# Patient Record
Sex: MALE | Race: BLACK OR AFRICAN AMERICAN | URBAN - METROPOLITAN AREA
[De-identification: names, ages, dates, MRNs, and addresses within clinical notes are randomized per-mention and may not be internally consistent; named-entity substitution may affect disease eponyms.]

---

## 2019-06-23 ENCOUNTER — HOSPITAL ENCOUNTER (INPATIENT)
Age: 54
LOS: 1 days | Discharge: OTHER INSTITUTION | DRG: 093 | End: 2019-06-25
Attending: FAMILY MEDICINE | Admitting: INTERNAL MEDICINE
Payer: COMMERCIAL

## 2019-06-23 ENCOUNTER — APPOINTMENT (OUTPATIENT)
Dept: GENERAL RADIOLOGY | Age: 54
DRG: 093 | End: 2019-06-23
Payer: COMMERCIAL

## 2019-06-23 ENCOUNTER — APPOINTMENT (OUTPATIENT)
Dept: CT IMAGING | Age: 54
DRG: 093 | End: 2019-06-23
Payer: COMMERCIAL

## 2019-06-23 DIAGNOSIS — R40.0 SOMNOLENCE: Primary | ICD-10-CM

## 2019-06-23 DIAGNOSIS — F25.1 SCHIZOAFFECTIVE DISORDER, DEPRESSIVE TYPE (HCC): ICD-10-CM

## 2019-06-23 PROBLEM — R41.82 ALTERED MENTAL STATUS: Status: ACTIVE | Noted: 2019-06-23

## 2019-06-23 LAB
ALBUMIN SERPL-MCNC: 4.1 G/DL (ref 3.5–4.6)
ALP BLD-CCNC: 98 U/L (ref 35–104)
ALT SERPL-CCNC: 27 U/L (ref 0–41)
AMPHETAMINE SCREEN, URINE: NORMAL
AMYLASE: 46 U/L (ref 22–93)
ANION GAP SERPL CALCULATED.3IONS-SCNC: 14 MEQ/L (ref 9–15)
AST SERPL-CCNC: 19 U/L (ref 0–40)
BARBITURATE SCREEN URINE: NORMAL
BASE EXCESS ARTERIAL: -4 (ref -3–3)
BASOPHILS ABSOLUTE: 0 K/UL (ref 0–0.2)
BASOPHILS RELATIVE PERCENT: 0.3 %
BENZODIAZEPINE SCREEN, URINE: NORMAL
BILIRUB SERPL-MCNC: <0.2 MG/DL (ref 0.2–0.7)
BILIRUBIN URINE: NEGATIVE
BLOOD, URINE: NEGATIVE
BUN BLDV-MCNC: 9 MG/DL (ref 6–20)
CALCIUM IONIZED: 1.24 MMOL/L (ref 1.12–1.32)
CALCIUM SERPL-MCNC: 9.1 MG/DL (ref 8.5–9.9)
CANNABINOID SCREEN URINE: NORMAL
CHLORIDE BLD-SCNC: 96 MEQ/L (ref 95–107)
CHP ED QC CHECK: YES
CK MB: 3.1 NG/ML (ref 0–6.7)
CLARITY: CLEAR
CO2: 23 MEQ/L (ref 20–31)
COCAINE METABOLITE SCREEN URINE: NORMAL
COLOR: YELLOW
CREAT SERPL-MCNC: 0.72 MG/DL (ref 0.7–1.2)
CREATINE KINASE-MB INDEX: 1 % (ref 0–3.5)
EKG ATRIAL RATE: 83 BPM
EKG P AXIS: 36 DEGREES
EKG P-R INTERVAL: 170 MS
EKG Q-T INTERVAL: 370 MS
EKG QRS DURATION: 84 MS
EKG QTC CALCULATION (BAZETT): 434 MS
EKG R AXIS: 38 DEGREES
EKG T AXIS: 11 DEGREES
EKG VENTRICULAR RATE: 83 BPM
EOSINOPHILS ABSOLUTE: 0.1 K/UL (ref 0–0.7)
EOSINOPHILS RELATIVE PERCENT: 1.2 %
ETHANOL PERCENT: NORMAL G/DL
ETHANOL: <10 MG/DL (ref 0–0.08)
GFR AFRICAN AMERICAN: >60
GFR AFRICAN AMERICAN: >60
GFR NON-AFRICAN AMERICAN: >60
GFR NON-AFRICAN AMERICAN: >60
GLOBULIN: 2.5 G/DL (ref 2.3–3.5)
GLUCOSE BLD-MCNC: 264 MG/DL
GLUCOSE BLD-MCNC: 264 MG/DL (ref 60–115)
GLUCOSE BLD-MCNC: 270 MG/DL (ref 60–115)
GLUCOSE BLD-MCNC: 306 MG/DL (ref 70–99)
GLUCOSE BLD-MCNC: 309 MG/DL (ref 60–115)
GLUCOSE URINE: >=1000 MG/DL
HCO3 ARTERIAL: 21.7 MMOL/L (ref 21–29)
HCT VFR BLD CALC: 38 % (ref 42–52)
HEMOGLOBIN: 12.8 G/DL (ref 14–18)
HEMOGLOBIN: 13.1 GM/DL (ref 13.5–17.5)
KETONES, URINE: ABNORMAL MG/DL
LACTATE: 3.64 MMOL/L (ref 0.4–2)
LEUKOCYTE ESTERASE, URINE: NEGATIVE
LIPASE: 34 U/L (ref 12–95)
LYMPHOCYTES ABSOLUTE: 1 K/UL (ref 1–4.8)
LYMPHOCYTES RELATIVE PERCENT: 11.4 %
Lab: NORMAL
MCH RBC QN AUTO: 31.3 PG (ref 27–31.3)
MCHC RBC AUTO-ENTMCNC: 33.7 % (ref 33–37)
MCV RBC AUTO: 92.7 FL (ref 80–100)
METHADONE SCREEN, URINE: NORMAL
METHAMPHETAMINE, URINE: NORMAL
MONOCYTES ABSOLUTE: 0.4 K/UL (ref 0.2–0.8)
MONOCYTES RELATIVE PERCENT: 4.6 %
NEUTROPHILS ABSOLUTE: 7 K/UL (ref 1.4–6.5)
NEUTROPHILS RELATIVE PERCENT: 82.5 %
NITRITE, URINE: NEGATIVE
O2 SAT, ARTERIAL: 97 % (ref 93–100)
OPIATE SCREEN URINE: NORMAL
PCO2 ARTERIAL: 39 MM HG (ref 35–45)
PDW BLD-RTO: 14.1 % (ref 11.5–14.5)
PERFORMED ON: ABNORMAL
PH ARTERIAL: 7.36 (ref 7.35–7.45)
PH UA: 6.5 (ref 5–9)
PHENCYCLIDINE SCREEN URINE: NORMAL
PLATELET # BLD: 158 K/UL (ref 130–400)
PO2 ARTERIAL: 93 MM HG (ref 75–108)
POC CHLORIDE: 106 MEQ/L (ref 99–110)
POC CREATININE: 0.7 MG/DL (ref 0.9–1.3)
POC FIO2: 21
POC HEMATOCRIT: 38 % (ref 41–53)
POC POTASSIUM: 4.4 MEQ/L (ref 3.5–5.1)
POC SAMPLE TYPE: ABNORMAL
POC SODIUM: 138 MEQ/L (ref 136–145)
POTASSIUM REFLEX MAGNESIUM: 4.5 MEQ/L (ref 3.4–4.9)
PROPOXYPHENE SCREEN, URINE: NORMAL
PROTEIN UA: NEGATIVE MG/DL
RBC # BLD: 4.09 M/UL (ref 4.7–6.1)
SODIUM BLD-SCNC: 133 MEQ/L (ref 135–144)
SPECIFIC GRAVITY UA: 1.01 (ref 1–1.03)
TCO2 ARTERIAL: 23 (ref 22–29)
TOTAL CK: 303 U/L (ref 0–190)
TOTAL PROTEIN: 6.6 G/DL (ref 6.3–8)
TRICYCLIC, URINE: NORMAL
TROPONIN: <0.01 NG/ML (ref 0–0.01)
TSH SERPL DL<=0.05 MIU/L-ACNC: 1.12 UIU/ML (ref 0.44–3.86)
UR OXYCODONE RAPID SCREEN: NORMAL
URINE REFLEX TO CULTURE: ABNORMAL
UROBILINOGEN, URINE: 0.2 E.U./DL
WBC # BLD: 8.5 K/UL (ref 4.8–10.8)

## 2019-06-23 PROCEDURE — 71045 X-RAY EXAM CHEST 1 VIEW: CPT

## 2019-06-23 PROCEDURE — 82803 BLOOD GASES ANY COMBINATION: CPT

## 2019-06-23 PROCEDURE — 87040 BLOOD CULTURE FOR BACTERIA: CPT

## 2019-06-23 PROCEDURE — 85014 HEMATOCRIT: CPT

## 2019-06-23 PROCEDURE — 99285 EMERGENCY DEPT VISIT HI MDM: CPT

## 2019-06-23 PROCEDURE — 83605 ASSAY OF LACTIC ACID: CPT

## 2019-06-23 PROCEDURE — 81003 URINALYSIS AUTO W/O SCOPE: CPT

## 2019-06-23 PROCEDURE — 2580000003 HC RX 258: Performed by: PHYSICIAN ASSISTANT

## 2019-06-23 PROCEDURE — 85025 COMPLETE CBC W/AUTO DIFF WBC: CPT

## 2019-06-23 PROCEDURE — 83690 ASSAY OF LIPASE: CPT

## 2019-06-23 PROCEDURE — G0378 HOSPITAL OBSERVATION PER HR: HCPCS

## 2019-06-23 PROCEDURE — 84132 ASSAY OF SERUM POTASSIUM: CPT

## 2019-06-23 PROCEDURE — 82435 ASSAY OF BLOOD CHLORIDE: CPT

## 2019-06-23 PROCEDURE — G0480 DRUG TEST DEF 1-7 CLASSES: HCPCS

## 2019-06-23 PROCEDURE — 84443 ASSAY THYROID STIM HORMONE: CPT

## 2019-06-23 PROCEDURE — 82550 ASSAY OF CK (CPK): CPT

## 2019-06-23 PROCEDURE — 82565 ASSAY OF CREATININE: CPT

## 2019-06-23 PROCEDURE — 93005 ELECTROCARDIOGRAM TRACING: CPT | Performed by: FAMILY MEDICINE

## 2019-06-23 PROCEDURE — 82330 ASSAY OF CALCIUM: CPT

## 2019-06-23 PROCEDURE — 2580000003 HC RX 258: Performed by: FAMILY MEDICINE

## 2019-06-23 PROCEDURE — 80053 COMPREHEN METABOLIC PANEL: CPT

## 2019-06-23 PROCEDURE — 82150 ASSAY OF AMYLASE: CPT

## 2019-06-23 PROCEDURE — 84484 ASSAY OF TROPONIN QUANT: CPT

## 2019-06-23 PROCEDURE — 82948 REAGENT STRIP/BLOOD GLUCOSE: CPT

## 2019-06-23 PROCEDURE — 82553 CREATINE MB FRACTION: CPT

## 2019-06-23 PROCEDURE — 36600 WITHDRAWAL OF ARTERIAL BLOOD: CPT

## 2019-06-23 PROCEDURE — 80306 DRUG TEST PRSMV INSTRMNT: CPT

## 2019-06-23 PROCEDURE — 84295 ASSAY OF SERUM SODIUM: CPT

## 2019-06-23 PROCEDURE — 36415 COLL VENOUS BLD VENIPUNCTURE: CPT

## 2019-06-23 PROCEDURE — 70450 CT HEAD/BRAIN W/O DYE: CPT

## 2019-06-23 RX ORDER — SODIUM CHLORIDE 0.9 % (FLUSH) 0.9 %
10 SYRINGE (ML) INJECTION PRN
Status: DISCONTINUED | OUTPATIENT
Start: 2019-06-23 | End: 2019-06-25 | Stop reason: HOSPADM

## 2019-06-23 RX ORDER — SODIUM CHLORIDE 0.9 % (FLUSH) 0.9 %
10 SYRINGE (ML) INJECTION EVERY 12 HOURS SCHEDULED
Status: DISCONTINUED | OUTPATIENT
Start: 2019-06-23 | End: 2019-06-25 | Stop reason: HOSPADM

## 2019-06-23 RX ORDER — ONDANSETRON 2 MG/ML
4 INJECTION INTRAMUSCULAR; INTRAVENOUS EVERY 6 HOURS PRN
Status: DISCONTINUED | OUTPATIENT
Start: 2019-06-23 | End: 2019-06-25 | Stop reason: HOSPADM

## 2019-06-23 RX ORDER — LISINOPRIL 10 MG/1
10 TABLET ORAL
COMMUNITY
Start: 2012-12-18

## 2019-06-23 RX ORDER — PIOGLITAZONEHYDROCHLORIDE 30 MG/1
30 TABLET ORAL DAILY
COMMUNITY

## 2019-06-23 RX ORDER — LANOLIN ALCOHOL/MO/W.PET/CERES
6 CREAM (GRAM) TOPICAL
COMMUNITY
Start: 2019-06-02

## 2019-06-23 RX ORDER — INSULIN GLARGINE 100 [IU]/ML
48 INJECTION, SOLUTION SUBCUTANEOUS
Status: ON HOLD | COMMUNITY
End: 2019-06-27 | Stop reason: SDUPTHER

## 2019-06-23 RX ORDER — AMANTADINE HYDROCHLORIDE 100 MG/1
100 CAPSULE, GELATIN COATED ORAL
Status: ON HOLD | COMMUNITY
Start: 2019-06-02 | End: 2019-06-27 | Stop reason: HOSPADM

## 2019-06-23 RX ORDER — OLANZAPINE 10 MG/1
INJECTION, POWDER, LYOPHILIZED, FOR SOLUTION INTRAMUSCULAR EVERY 6 HOURS PRN
Status: ON HOLD | COMMUNITY
End: 2019-06-27 | Stop reason: HOSPADM

## 2019-06-23 RX ORDER — BENZTROPINE MESYLATE 2 MG/1
1 TABLET ORAL
Status: ON HOLD | COMMUNITY
Start: 2019-06-02 | End: 2019-06-27 | Stop reason: HOSPADM

## 2019-06-23 RX ORDER — FAMOTIDINE 20 MG/1
20 TABLET, FILM COATED ORAL 2 TIMES DAILY
Status: DISCONTINUED | OUTPATIENT
Start: 2019-06-23 | End: 2019-06-25 | Stop reason: HOSPADM

## 2019-06-23 RX ORDER — SODIUM CHLORIDE 9 MG/ML
INJECTION, SOLUTION INTRAVENOUS CONTINUOUS
Status: DISCONTINUED | OUTPATIENT
Start: 2019-06-23 | End: 2019-06-25 | Stop reason: HOSPADM

## 2019-06-23 RX ORDER — NALOXONE HYDROCHLORIDE 1 MG/ML
2 INJECTION INTRAMUSCULAR; INTRAVENOUS; SUBCUTANEOUS PRN
Status: DISCONTINUED | OUTPATIENT
Start: 2019-06-23 | End: 2019-06-25 | Stop reason: HOSPADM

## 2019-06-23 RX ORDER — 0.9 % SODIUM CHLORIDE 0.9 %
1000 INTRAVENOUS SOLUTION INTRAVENOUS ONCE
Status: COMPLETED | OUTPATIENT
Start: 2019-06-23 | End: 2019-06-23

## 2019-06-23 RX ORDER — CLOZAPINE 100 MG/1
100 TABLET ORAL 4 TIMES DAILY
COMMUNITY
Start: 2019-06-02

## 2019-06-23 RX ORDER — CLONAZEPAM 1 MG/1
1 TABLET ORAL 2 TIMES DAILY PRN
Status: ON HOLD | COMMUNITY
End: 2019-06-25 | Stop reason: HOSPADM

## 2019-06-23 RX ADMIN — Medication 10 ML: at 23:38

## 2019-06-23 RX ADMIN — SODIUM CHLORIDE: 9 INJECTION, SOLUTION INTRAVENOUS at 23:38

## 2019-06-23 RX ADMIN — SODIUM CHLORIDE 1000 ML: 9 INJECTION, SOLUTION INTRAVENOUS at 17:13

## 2019-06-23 ASSESSMENT — ENCOUNTER SYMPTOMS
EYE DEVIATION: 0
ABDOMINAL PAIN: 0
NAUSEA: 0
DIFFICULTY BREATHING: 0

## 2019-06-23 NOTE — ED PROVIDER NOTES
nausea. Genitourinary: Negative for bladder incontinence. Neurological: Negative for light-headedness and headaches. Psychiatric/Behavioral: Positive for agitation. Negative for hallucinations. Except as noted above the remainder of the review of systems was reviewed and negative. PAST MEDICAL HISTORY     Past Medical History:   Diagnosis Date    Diabetes mellitus ([facility]     Hyperlipidemia     Hypertension     Schizophrenia ([facility]          SURGICALHISTORY     History reviewed. No pertinent surgical history. CURRENT MEDICATIONS       Previous Medications    ACETAMINOPHEN-GUAIFENESIN 650-400 MG TABS    Take 650 mg by mouth    AMANTADINE (SYMMETREL) 100 MG CAPSULE    Take 100 mg by mouth    BENZTROPINE (COGENTIN) 2 MG TABLET    Take 1 mg by mouth    CLOZAPINE (CLOZARIL) 100 MG TABLET    Take 300 mg by mouth    INSULIN ASPART (NOVOLOG) 100 UNIT/ML INJECTION VIAL    Inject 100 Units into the skin 3 times daily (before meals) Indications: TID with meals along with bedtime. INSULIN GLARGINE (LANTUS) 100 UNIT/ML INJECTION VIAL    Inject 48 Units into the skin    LISINOPRIL (PRINIVIL;ZESTRIL) 10 MG TABLET    Take 10 mg by mouth    MAGNESIUM HYDROXIDE (MILK OF MAGNESIA) 400 MG/5ML SUSPENSION    Take 30 mLs by mouth    MELATONIN 3 MG TABS TABLET    6 mg    METFORMIN (GLUCOPHAGE) 850 MG TABLET    Take 850 mg by mouth       ALLERGIES     Lithium; Nsaids; and Valproic acid    FAMILY HISTORY     History reviewed. No pertinent family history.        SOCIAL HISTORY       Social History     Socioeconomic History    Marital status: Single     Spouse name: None    Number of children: None    Years of education: None    Highest education level: None   Occupational History    None   Social Needs    Financial resource strain: None    Food insecurity:     Worry: None     Inability: None    Transportation needs:     Medical: None     Non-medical: None   Tobacco Use    Smoking status: None   Substance and Abnormal; Notable for the following components: Total  (*)     All other components within normal limits   POCT ARTERIAL - Abnormal; Notable for the following components:    POC Glucose 309 (*)     POC Creatinine 0.7 (*)     pO2, Arterial 93 (*)     Base Excess, Arterial -4 (*)     O2 Sat, Arterial 97 (*)     Lactate 3.64 (*)     POC Hematocrit 38 (*)     Hemoglobin 13.1 (*)     All other components within normal limits   CULTURE BLOOD #2   LIPASE   AMYLASE   TROPONIN   TSH WITHOUT REFLEX   ETHANOL   CKMB & RELATIVE PERCENT   URINE DRUG SCREEN, COMPREHENSIVE   ETHANOL   VALPROIC ACID LEVEL, FREE   POCT ISTAT BLOOD GAS, CREATININE, ACT-K       All other labs were within normal range or not returned as of this dictation. EMERGENCY DEPARTMENT COURSE and DIFFERENTIAL DIAGNOSIS/MDM:   Vitals:    Vitals:    06/23/19 1603 06/23/19 1716 06/23/19 1835 06/23/19 1850   BP: (!) 140/75 134/89 (!) 143/75    Pulse:  77 91    Resp: 16 18 18 16   Temp: 96.2 °F (35.7 °C)      TempSrc: Temporal      SpO2: 100% 100% 100% 100%   Weight: 220 lb (99.8 kg)      Height: 6' (1.829 m)              MDM  Number of Diagnoses or Management Options  Insulin dependent diabetes mellitus ([facility]):   Schizoaffective disorder, depressive type ([place] [address]):   Somnolence:   Diagnosis management comments: She 9years old with known schizoaffective disorder bipolar with multiple psychosomatic including clonazepam and Risperdal that he took for the first time in 20 days this morning. Presenting from presented from the nursing home for evaluation due to altered mental status and they were unable to wake him up on arrival the patient was responsive to painful and verbal stimuli maintaining his airway was found to have normal vitals normal x-ray normal ABGs but he remained somnolescent and sleepy.   Patient will be admitted under the medical   Also patient sister who is on his paper as the emergency contact called the ER and states she is from [place] [facility] [place] and she would prefer him after release from the hospital to be placed in his original facility [facility] or any other facility in [place] as they are both from the [place] area. As far as his diabetes patient was found to have a blood sugar of 264 no ofr ketoacidosis    CONSULTS:  None    PROCEDURES:  Unless otherwise noted below, none     Procedures    FINAL IMPRESSION      1. Somnolence    2. Schizoaffective disorder, depressive type ([facility]    3. Insulin dependent diabetes mellitus ([facility]          DISPOSITION/PLAN   DISPOSITION Decision To Admit 06/23/2019 07:34:22 PM      PATIENT REFERRED TO:  No follow-up provider specified.     DISCHARGE MEDICATIONS:  New Prescriptions    No medications on file          (Please note thatportions of this note were completed with a voice recognition program.  Efforts were made to edit the dictations but occasionally words are mis-transcribed.)    Cal Escalera MD (electronically signed)  Attending Emergency Physician          Darius Montoya MD  06/23/19 Lewis Montoya MD  06/23/19 2462

## 2019-06-23 NOTE — ED NOTES
Patient is alert to painful and verbal stimuli. He does protect his airway and swallows when he coughs up some phlem. Resp is currently in room with pt drawing ABG's Labs. Will continue to monitor.       Idalia Hsu RN  06/23/19 3564

## 2019-06-23 NOTE — CARE COORDINATION
Pt's sister lives in [place] and would like pt transferred to [facility] psychiatric hospital after d/c if possible not [facility].

## 2019-06-24 LAB
ANION GAP SERPL CALCULATED.3IONS-SCNC: 15 MEQ/L (ref 9–15)
ANION GAP SERPL CALCULATED.3IONS-SCNC: 16 MEQ/L (ref 9–15)
BUN BLDV-MCNC: 10 MG/DL (ref 6–20)
BUN BLDV-MCNC: 10 MG/DL (ref 6–20)
CALCIUM SERPL-MCNC: 9 MG/DL (ref 8.5–9.9)
CALCIUM SERPL-MCNC: 9.3 MG/DL (ref 8.5–9.9)
CHLORIDE BLD-SCNC: 100 MEQ/L (ref 95–107)
CHLORIDE BLD-SCNC: 99 MEQ/L (ref 95–107)
CO2: 20 MEQ/L (ref 20–31)
CO2: 21 MEQ/L (ref 20–31)
CREAT SERPL-MCNC: 0.86 MG/DL (ref 0.7–1.2)
CREAT SERPL-MCNC: 0.89 MG/DL (ref 0.7–1.2)
GFR AFRICAN AMERICAN: >60
GFR AFRICAN AMERICAN: >60
GFR NON-AFRICAN AMERICAN: >60
GFR NON-AFRICAN AMERICAN: >60
GLUCOSE BLD-MCNC: 258 MG/DL (ref 60–115)
GLUCOSE BLD-MCNC: 317 MG/DL (ref 70–99)
GLUCOSE BLD-MCNC: 324 MG/DL (ref 70–99)
GLUCOSE BLD-MCNC: 337 MG/DL (ref 60–115)
GLUCOSE BLD-MCNC: 429 MG/DL (ref 60–115)
GLUCOSE BLD-MCNC: 485 MG/DL (ref 60–115)
PERFORMED ON: ABNORMAL
POTASSIUM REFLEX MAGNESIUM: 4.3 MEQ/L (ref 3.4–4.9)
POTASSIUM SERPL-SCNC: 4.2 MEQ/L (ref 3.4–4.9)
REASON FOR REJECTION: NORMAL
REASON FOR REJECTION: NORMAL
REJECTED TEST: NORMAL
REJECTED TEST: NORMAL
SODIUM BLD-SCNC: 135 MEQ/L (ref 135–144)
SODIUM BLD-SCNC: 136 MEQ/L (ref 135–144)

## 2019-06-24 PROCEDURE — 80048 BASIC METABOLIC PNL TOTAL CA: CPT

## 2019-06-24 PROCEDURE — 6370000000 HC RX 637 (ALT 250 FOR IP): Performed by: PHYSICIAN ASSISTANT

## 2019-06-24 PROCEDURE — 6370000000 HC RX 637 (ALT 250 FOR IP): Performed by: INTERNAL MEDICINE

## 2019-06-24 PROCEDURE — 99222 1ST HOSP IP/OBS MODERATE 55: CPT | Performed by: INTERNAL MEDICINE

## 2019-06-24 PROCEDURE — G0378 HOSPITAL OBSERVATION PER HR: HCPCS

## 2019-06-24 PROCEDURE — 36415 COLL VENOUS BLD VENIPUNCTURE: CPT

## 2019-06-24 PROCEDURE — 99222 1ST HOSP IP/OBS MODERATE 55: CPT | Performed by: PSYCHIATRY & NEUROLOGY

## 2019-06-24 PROCEDURE — 6370000000 HC RX 637 (ALT 250 FOR IP): Performed by: PSYCHIATRY & NEUROLOGY

## 2019-06-24 RX ORDER — INSULIN GLARGINE 100 [IU]/ML
48 INJECTION, SOLUTION SUBCUTANEOUS
Status: DISCONTINUED | OUTPATIENT
Start: 2019-06-24 | End: 2019-06-24

## 2019-06-24 RX ORDER — NICOTINE POLACRILEX 4 MG
15 LOZENGE BUCCAL PRN
Status: DISCONTINUED | OUTPATIENT
Start: 2019-06-24 | End: 2019-06-25 | Stop reason: HOSPADM

## 2019-06-24 RX ORDER — PIOGLITAZONEHYDROCHLORIDE 30 MG/1
30 TABLET ORAL DAILY
Status: DISCONTINUED | OUTPATIENT
Start: 2019-06-25 | End: 2019-06-25 | Stop reason: HOSPADM

## 2019-06-24 RX ORDER — HALOPERIDOL 5 MG/ML
5 INJECTION INTRAMUSCULAR EVERY 4 HOURS PRN
Status: DISCONTINUED | OUTPATIENT
Start: 2019-06-24 | End: 2019-06-25 | Stop reason: HOSPADM

## 2019-06-24 RX ORDER — DEXTROSE MONOHYDRATE 50 MG/ML
100 INJECTION, SOLUTION INTRAVENOUS PRN
Status: DISCONTINUED | OUTPATIENT
Start: 2019-06-24 | End: 2019-06-25 | Stop reason: HOSPADM

## 2019-06-24 RX ORDER — INSULIN GLARGINE 100 [IU]/ML
20 INJECTION, SOLUTION SUBCUTANEOUS 2 TIMES DAILY
Status: DISCONTINUED | OUTPATIENT
Start: 2019-06-25 | End: 2019-06-24

## 2019-06-24 RX ORDER — INSULIN GLARGINE 100 [IU]/ML
50 INJECTION, SOLUTION SUBCUTANEOUS NIGHTLY
Status: DISCONTINUED | OUTPATIENT
Start: 2019-06-24 | End: 2019-06-25 | Stop reason: HOSPADM

## 2019-06-24 RX ORDER — INSULIN GLARGINE 100 [IU]/ML
20 INJECTION, SOLUTION SUBCUTANEOUS 2 TIMES DAILY
Status: DISCONTINUED | OUTPATIENT
Start: 2019-06-24 | End: 2019-06-24

## 2019-06-24 RX ORDER — RISPERIDONE 0.5 MG/1
0.5 TABLET, FILM COATED ORAL 2 TIMES DAILY
Status: DISCONTINUED | OUTPATIENT
Start: 2019-06-24 | End: 2019-06-25

## 2019-06-24 RX ORDER — LISINOPRIL 5 MG/1
10 TABLET ORAL DAILY
Status: DISCONTINUED | OUTPATIENT
Start: 2019-06-24 | End: 2019-06-25 | Stop reason: HOSPADM

## 2019-06-24 RX ORDER — INSULIN GLARGINE 100 [IU]/ML
20 INJECTION, SOLUTION SUBCUTANEOUS
Status: DISCONTINUED | OUTPATIENT
Start: 2019-06-24 | End: 2019-06-24

## 2019-06-24 RX ORDER — DEXTROSE MONOHYDRATE 25 G/50ML
12.5 INJECTION, SOLUTION INTRAVENOUS PRN
Status: DISCONTINUED | OUTPATIENT
Start: 2019-06-24 | End: 2019-06-25 | Stop reason: HOSPADM

## 2019-06-24 RX ADMIN — INSULIN LISPRO 8 UNITS: 100 INJECTION, SOLUTION INTRAVENOUS; SUBCUTANEOUS at 12:54

## 2019-06-24 RX ADMIN — LISINOPRIL 10 MG: 5 TABLET ORAL at 23:30

## 2019-06-24 RX ADMIN — INSULIN GLARGINE 48 UNITS: 100 INJECTION, SOLUTION SUBCUTANEOUS at 12:43

## 2019-06-24 RX ADMIN — INSULIN GLARGINE 50 UNITS: 100 INJECTION, SOLUTION SUBCUTANEOUS at 22:00

## 2019-06-24 RX ADMIN — RISPERIDONE 0.5 MG: 0.5 TABLET ORAL at 23:32

## 2019-06-24 RX ADMIN — FAMOTIDINE 20 MG: 20 TABLET, FILM COATED ORAL at 23:29

## 2019-06-24 RX ADMIN — RISPERIDONE 0.5 MG: 0.5 TABLET ORAL at 16:35

## 2019-06-24 RX ADMIN — INSULIN LISPRO 12 UNITS: 100 INJECTION, SOLUTION INTRAVENOUS; SUBCUTANEOUS at 18:02

## 2019-06-24 RX ADMIN — METFORMIN HYDROCHLORIDE 850 MG: 850 TABLET ORAL at 12:42

## 2019-06-24 RX ADMIN — FAMOTIDINE 20 MG: 20 TABLET, FILM COATED ORAL at 09:02

## 2019-06-24 NOTE — PROGRESS NOTES
Monitoring patient as 1:1 Received report from PCA and night shift RN. Patient is known to become violent, walking around in room, not following commands, trying to remove clothing. I will sit by door and monitor patient for safety at this time.  Electronically signed by Saul Goel on 6/24/2019 at 7:14 AM

## 2019-06-24 NOTE — H&P
glargine (LANTUS) 100 UNIT/ML injection vial Inject 48 Units into the skin    Historical Provider, MD       Allergies:  Lithium; Nsaids; and Valproic acid    Social History:      The patient currently lives home    TOBACCO:   has no tobacco history on file. ETOH:   reports that he drinks alcohol. Family History:           History reviewed. No pertinent family history. REVIEW OF SYSTEMS:   Pertinent positives as noted in the HPI. All other systems reviewed and negative. PHYSICAL EXAM:    BP (!) 143/75   Pulse 91   Temp 96.2 °F (35.7 °C) (Temporal)   Resp 16   Ht 6' (1.829 m)   Wt 220 lb (99.8 kg)   SpO2 100%   BMI 29.84 kg/m²     General appearance:  No apparent distress, appears stated age and cooperative. HEENT:  Normal cephalic, atraumatic without obvious deformity. Pupils equal, round, and reactive to light. Extra ocular muscles intact. Conjunctivae/corneas clear. Neck: Supple, with full range of motion. No jugular venous distention. Trachea midline. Respiratory:  Normal respiratory effort. Clear to auscultation, bilaterally without Rales/Wheezes/Rhonchi. Cardiovascular:  Regular rate and rhythm with normal S1/S2 without murmurs, rubs or gallops. Abdomen: Soft, non-tender, non-distended with normal bowel sounds. Musculoskeletal:  No clubbing, cyanosis or edema bilaterally. Full range of motion without deformity. Skin: Skin color, texture, turgor normal.  No rashes or lesions. Neurologic:  Neurovascularly intact without any focal sensory/motor deficits.  Cranial nerves: II-XII intact, grossly non-focal.  Psychiatric:  Alert and oriented, thought content appropriate, normal insight  Capillary Refill: Brisk,< 3 seconds   Peripheral Pulses: +2 palpable, equal bilaterally       Labs:     Recent Labs     06/23/19  1615 06/23/19  1850   WBC 8.5  --    HGB 12.8* 13.1*   HCT 38.0*  --      --      Recent Labs     06/23/19  1615 06/23/19  1850   *  --    K 4.5  --    CL 96  --

## 2019-06-24 NOTE — CONSULTS
Department of Psychiatry  Behavioral Health Consult  HISTORY OF PRESENT ILLNESS:       The patient is a 52 y.o. male with significant past history of paranoid schizophrenia     Pt has a long h/o schizophrenia  Pt was send to [facility] for increase lethargy and somnolence  Initially got admitted to [facility] and then was admitted to medical floor  Pt is internally stimulated with incoherent speech  Pt is pacing the unit non stop and intrusive  Pt is getting close to other patient in the unit putting him at risk of getting assualted by others  Pt was living at [facility]  Pt is very difficult to redirect  Need PRN medication to calm him down  Speak in whispering tone during the interview     Stressors: poor physical health     The patient is currently receiving care for the above psychiatric illness.     Medications Prior to Admission:   Medications Prior to Admission: cloZAPine (CLOZARIL) 100 MG tablet, Take 100 mg by mouth 4 times daily Indications: Psychosis   Acetaminophen-guaiFENesin 650-400 MG TABS, Take 650 mg by mouth  amantadine (SYMMETREL) 100 MG capsule, Take 100 mg by mouth  benztropine (COGENTIN) 2 MG tablet, Take 1 mg by mouth  insulin glargine (LANTUS) 100 UNIT/ML injection vial, Inject 48 Units into the skin  lisinopril (PRINIVIL;ZESTRIL) 10 MG tablet, Take 10 mg by mouth  magnesium hydroxide (MILK OF MAGNESIA) 400 MG/5ML suspension, Take 30 mLs by mouth  melatonin 3 MG TABS tablet, 6 mg  metFORMIN (GLUCOPHAGE) 850 MG tablet, Take 850 mg by mouth  insulin aspart (NOVOLOG) 100 UNIT/ML injection vial, Inject 100 Units into the skin 3 times daily (before meals) Indications: TID with meals along with bedtime.   pioglitazone (ACTOS) 30 MG tablet, Take 30 mg by mouth daily  Ergocalciferol (VITAMIN D2 PO), Take 50,000 Units by mouth Only on Monday's and Thursdays  OLANZapine (ZYPREXA) 10 MG injection, Inject into the muscle every 6 hours as needed for Agitation     Compliance:yes     Psychiatric Review of

## 2019-06-24 NOTE — CONSULTS
Inpatient consult to Neurology  Consult performed by: Annie Obrien MD  Consult ordered by: Yohannes Gonzales MD      Encephalopathy  Primary psychiatry disorder  No neuro deficits    [facility]  Paulette Carroll MD, Ramila Baig, American Board of Psychiatry & Neurology  Board Certified in Vascular Neurology  Board Certified in Neuromuscular Medicine  Certified in . [place]

## 2019-06-25 ENCOUNTER — HOSPITAL ENCOUNTER (INPATIENT)
Age: 54
LOS: 2 days | Discharge: HOME | DRG: 885 | End: 2019-06-27
Attending: PSYCHIATRY & NEUROLOGY | Admitting: PSYCHIATRY & NEUROLOGY
Payer: COMMERCIAL

## 2019-06-25 VITALS
TEMPERATURE: 97.9 F | RESPIRATION RATE: 20 BRPM | HEART RATE: 102 BPM | SYSTOLIC BLOOD PRESSURE: 156 MMHG | HEIGHT: 72 IN | DIASTOLIC BLOOD PRESSURE: 75 MMHG | WEIGHT: 220 LBS | BODY MASS INDEX: 29.8 KG/M2 | OXYGEN SATURATION: 97 %

## 2019-06-25 PROBLEM — F20.0 PARANOID SCHIZOPHRENIA (HCC): Status: ACTIVE | Noted: 2019-06-25

## 2019-06-25 LAB
ANION GAP SERPL CALCULATED.3IONS-SCNC: 13 MEQ/L (ref 9–15)
BASOPHILS ABSOLUTE: 0 K/UL (ref 0–0.2)
BASOPHILS RELATIVE PERCENT: 0.4 %
BUN BLDV-MCNC: 10 MG/DL (ref 6–20)
CALCIUM SERPL-MCNC: 8.8 MG/DL (ref 8.5–9.9)
CHLORIDE BLD-SCNC: 102 MEQ/L (ref 95–107)
CO2: 23 MEQ/L (ref 20–31)
CREAT SERPL-MCNC: 0.75 MG/DL (ref 0.7–1.2)
EOSINOPHILS ABSOLUTE: 0.4 K/UL (ref 0–0.7)
EOSINOPHILS RELATIVE PERCENT: 5.4 %
GFR AFRICAN AMERICAN: >60
GFR NON-AFRICAN AMERICAN: >60
GLUCOSE BLD-MCNC: 194 MG/DL (ref 60–115)
GLUCOSE BLD-MCNC: 242 MG/DL (ref 70–99)
GLUCOSE BLD-MCNC: 298 MG/DL (ref 60–115)
GLUCOSE BLD-MCNC: 404 MG/DL (ref 60–115)
GLUCOSE BLD-MCNC: 418 MG/DL (ref 60–115)
HBA1C MFR BLD: 9.4 % (ref 4.8–5.9)
HCT VFR BLD CALC: 36 % (ref 42–52)
HEMOGLOBIN: 12.2 G/DL (ref 14–18)
LACTIC ACID: 1 MMOL/L (ref 0.5–2.2)
LYMPHOCYTES ABSOLUTE: 1.4 K/UL (ref 1–4.8)
LYMPHOCYTES RELATIVE PERCENT: 20 %
MCH RBC QN AUTO: 31.3 PG (ref 27–31.3)
MCHC RBC AUTO-ENTMCNC: 33.7 % (ref 33–37)
MCV RBC AUTO: 92.8 FL (ref 80–100)
MONOCYTES ABSOLUTE: 0.5 K/UL (ref 0.2–0.8)
MONOCYTES RELATIVE PERCENT: 7.5 %
NEUTROPHILS ABSOLUTE: 4.6 K/UL (ref 1.4–6.5)
NEUTROPHILS RELATIVE PERCENT: 66.7 %
PDW BLD-RTO: 14.3 % (ref 11.5–14.5)
PERFORMED ON: ABNORMAL
PLATELET # BLD: 144 K/UL (ref 130–400)
POTASSIUM REFLEX MAGNESIUM: 3.7 MEQ/L (ref 3.4–4.9)
RBC # BLD: 3.88 M/UL (ref 4.7–6.1)
SODIUM BLD-SCNC: 138 MEQ/L (ref 135–144)
WBC # BLD: 6.9 K/UL (ref 4.8–10.8)

## 2019-06-25 PROCEDURE — 6370000000 HC RX 637 (ALT 250 FOR IP): Performed by: INTERNAL MEDICINE

## 2019-06-25 PROCEDURE — 80048 BASIC METABOLIC PNL TOTAL CA: CPT

## 2019-06-25 PROCEDURE — 93010 ELECTROCARDIOGRAM REPORT: CPT | Performed by: INTERNAL MEDICINE

## 2019-06-25 PROCEDURE — 6360000002 HC RX W HCPCS: Performed by: PHYSICIAN ASSISTANT

## 2019-06-25 PROCEDURE — 83605 ASSAY OF LACTIC ACID: CPT

## 2019-06-25 PROCEDURE — 6360000002 HC RX W HCPCS: Performed by: PSYCHIATRY & NEUROLOGY

## 2019-06-25 PROCEDURE — 1240000000 HC EMOTIONAL WELLNESS R&B

## 2019-06-25 PROCEDURE — 85025 COMPLETE CBC W/AUTO DIFF WBC: CPT

## 2019-06-25 PROCEDURE — 6360000002 HC RX W HCPCS: Performed by: INTERNAL MEDICINE

## 2019-06-25 PROCEDURE — 6360000002 HC RX W HCPCS

## 2019-06-25 PROCEDURE — 99232 SBSQ HOSP IP/OBS MODERATE 35: CPT | Performed by: PHYSICIAN ASSISTANT

## 2019-06-25 PROCEDURE — 6370000000 HC RX 637 (ALT 250 FOR IP): Performed by: PSYCHIATRY & NEUROLOGY

## 2019-06-25 PROCEDURE — 6370000000 HC RX 637 (ALT 250 FOR IP): Performed by: PHYSICIAN ASSISTANT

## 2019-06-25 PROCEDURE — 83036 HEMOGLOBIN GLYCOSYLATED A1C: CPT

## 2019-06-25 PROCEDURE — 1210000000 HC MED SURG R&B

## 2019-06-25 PROCEDURE — 36415 COLL VENOUS BLD VENIPUNCTURE: CPT

## 2019-06-25 RX ORDER — ONDANSETRON 2 MG/ML
4 INJECTION INTRAMUSCULAR; INTRAVENOUS EVERY 6 HOURS PRN
Status: DISCONTINUED | OUTPATIENT
Start: 2019-06-25 | End: 2019-06-26

## 2019-06-25 RX ORDER — HYDROXYZINE HYDROCHLORIDE 50 MG/ML
50 INJECTION, SOLUTION INTRAMUSCULAR EVERY 6 HOURS PRN
Status: DISCONTINUED | OUTPATIENT
Start: 2019-06-25 | End: 2019-06-27 | Stop reason: HOSPADM

## 2019-06-25 RX ORDER — CLOZAPINE 100 MG/1
100 TABLET ORAL 2 TIMES DAILY
Status: DISCONTINUED | OUTPATIENT
Start: 2019-06-25 | End: 2019-06-25 | Stop reason: HOSPADM

## 2019-06-25 RX ORDER — LORAZEPAM 2 MG/ML
2 INJECTION INTRAMUSCULAR EVERY 6 HOURS PRN
Status: DISCONTINUED | OUTPATIENT
Start: 2019-06-25 | End: 2019-06-27 | Stop reason: HOSPADM

## 2019-06-25 RX ORDER — PIOGLITAZONEHYDROCHLORIDE 30 MG/1
30 TABLET ORAL DAILY
Status: CANCELLED | OUTPATIENT
Start: 2019-06-26

## 2019-06-25 RX ORDER — BENZTROPINE MESYLATE 1 MG/ML
2 INJECTION INTRAMUSCULAR; INTRAVENOUS 2 TIMES DAILY PRN
Status: CANCELLED | OUTPATIENT
Start: 2019-06-25

## 2019-06-25 RX ORDER — ACETAMINOPHEN 325 MG/1
650 TABLET ORAL EVERY 4 HOURS PRN
Status: DISCONTINUED | OUTPATIENT
Start: 2019-06-25 | End: 2019-06-27 | Stop reason: HOSPADM

## 2019-06-25 RX ORDER — TRAZODONE HYDROCHLORIDE 50 MG/1
50 TABLET ORAL NIGHTLY PRN
Status: DISCONTINUED | OUTPATIENT
Start: 2019-06-25 | End: 2019-06-27 | Stop reason: HOSPADM

## 2019-06-25 RX ORDER — FAMOTIDINE 20 MG/1
20 TABLET, FILM COATED ORAL 2 TIMES DAILY
Status: CANCELLED | OUTPATIENT
Start: 2019-06-25

## 2019-06-25 RX ORDER — SODIUM CHLORIDE 9 MG/ML
INJECTION, SOLUTION INTRAVENOUS CONTINUOUS
Status: CANCELLED | OUTPATIENT
Start: 2019-06-25

## 2019-06-25 RX ORDER — TRAZODONE HYDROCHLORIDE 50 MG/1
50 TABLET ORAL NIGHTLY PRN
Status: CANCELLED | OUTPATIENT
Start: 2019-06-25

## 2019-06-25 RX ORDER — ACETAMINOPHEN 325 MG/1
650 TABLET ORAL EVERY 4 HOURS PRN
Status: CANCELLED | OUTPATIENT
Start: 2019-06-25

## 2019-06-25 RX ORDER — MAGNESIUM HYDROXIDE/ALUMINUM HYDROXICE/SIMETHICONE 120; 1200; 1200 MG/30ML; MG/30ML; MG/30ML
30 SUSPENSION ORAL PRN
Status: DISCONTINUED | OUTPATIENT
Start: 2019-06-25 | End: 2019-06-27 | Stop reason: HOSPADM

## 2019-06-25 RX ORDER — HALOPERIDOL 5 MG
5 TABLET ORAL ONCE
Status: COMPLETED | OUTPATIENT
Start: 2019-06-25 | End: 2019-06-25

## 2019-06-25 RX ORDER — ONDANSETRON 2 MG/ML
4 INJECTION INTRAMUSCULAR; INTRAVENOUS EVERY 6 HOURS PRN
Status: CANCELLED | OUTPATIENT
Start: 2019-06-25

## 2019-06-25 RX ORDER — PIOGLITAZONEHYDROCHLORIDE 30 MG/1
30 TABLET ORAL DAILY
Status: DISCONTINUED | OUTPATIENT
Start: 2019-06-26 | End: 2019-06-27 | Stop reason: HOSPADM

## 2019-06-25 RX ORDER — LISINOPRIL 5 MG/1
10 TABLET ORAL DAILY
Status: CANCELLED | OUTPATIENT
Start: 2019-06-26

## 2019-06-25 RX ORDER — BENZTROPINE MESYLATE 1 MG/ML
2 INJECTION INTRAMUSCULAR; INTRAVENOUS 2 TIMES DAILY PRN
Status: DISCONTINUED | OUTPATIENT
Start: 2019-06-25 | End: 2019-06-27 | Stop reason: HOSPADM

## 2019-06-25 RX ORDER — SODIUM CHLORIDE 0.9 % (FLUSH) 0.9 %
10 SYRINGE (ML) INJECTION EVERY 12 HOURS SCHEDULED
Status: CANCELLED | OUTPATIENT
Start: 2019-06-25

## 2019-06-25 RX ORDER — SODIUM CHLORIDE 0.9 % (FLUSH) 0.9 %
10 SYRINGE (ML) INJECTION EVERY 12 HOURS SCHEDULED
Status: DISCONTINUED | OUTPATIENT
Start: 2019-06-25 | End: 2019-06-26 | Stop reason: ALTCHOICE

## 2019-06-25 RX ORDER — NALOXONE HYDROCHLORIDE 1 MG/ML
2 INJECTION INTRAMUSCULAR; INTRAVENOUS; SUBCUTANEOUS PRN
Status: DISCONTINUED | OUTPATIENT
Start: 2019-06-25 | End: 2019-06-26

## 2019-06-25 RX ORDER — HALOPERIDOL 5 MG/ML
5 INJECTION INTRAMUSCULAR EVERY 4 HOURS PRN
Status: DISCONTINUED | OUTPATIENT
Start: 2019-06-25 | End: 2019-06-27 | Stop reason: HOSPADM

## 2019-06-25 RX ORDER — SODIUM CHLORIDE 0.9 % (FLUSH) 0.9 %
10 SYRINGE (ML) INJECTION PRN
Status: DISCONTINUED | OUTPATIENT
Start: 2019-06-25 | End: 2019-06-26

## 2019-06-25 RX ORDER — NICOTINE POLACRILEX 4 MG
15 LOZENGE BUCCAL PRN
Status: CANCELLED | OUTPATIENT
Start: 2019-06-25

## 2019-06-25 RX ORDER — CLOZAPINE 100 MG/1
100 TABLET ORAL 2 TIMES DAILY
Status: DISCONTINUED | OUTPATIENT
Start: 2019-06-25 | End: 2019-06-26

## 2019-06-25 RX ORDER — MAGNESIUM HYDROXIDE/ALUMINUM HYDROXICE/SIMETHICONE 120; 1200; 1200 MG/30ML; MG/30ML; MG/30ML
30 SUSPENSION ORAL PRN
Status: CANCELLED | OUTPATIENT
Start: 2019-06-25

## 2019-06-25 RX ORDER — DEXTROSE MONOHYDRATE 25 G/50ML
12.5 INJECTION, SOLUTION INTRAVENOUS PRN
Status: CANCELLED | OUTPATIENT
Start: 2019-06-25

## 2019-06-25 RX ORDER — NICOTINE POLACRILEX 4 MG
15 LOZENGE BUCCAL PRN
Status: DISCONTINUED | OUTPATIENT
Start: 2019-06-25 | End: 2019-06-27 | Stop reason: HOSPADM

## 2019-06-25 RX ORDER — DEXTROSE MONOHYDRATE 25 G/50ML
12.5 INJECTION, SOLUTION INTRAVENOUS PRN
Status: DISCONTINUED | OUTPATIENT
Start: 2019-06-25 | End: 2019-06-27 | Stop reason: HOSPADM

## 2019-06-25 RX ORDER — SODIUM CHLORIDE 9 MG/ML
INJECTION, SOLUTION INTRAVENOUS CONTINUOUS
Status: DISCONTINUED | OUTPATIENT
Start: 2019-06-25 | End: 2019-06-26

## 2019-06-25 RX ORDER — DEXTROSE MONOHYDRATE 50 MG/ML
100 INJECTION, SOLUTION INTRAVENOUS PRN
Status: DISCONTINUED | OUTPATIENT
Start: 2019-06-25 | End: 2019-06-26

## 2019-06-25 RX ORDER — LISINOPRIL 10 MG/1
10 TABLET ORAL DAILY
Status: DISCONTINUED | OUTPATIENT
Start: 2019-06-26 | End: 2019-06-27 | Stop reason: HOSPADM

## 2019-06-25 RX ORDER — HALOPERIDOL 5 MG/ML
5 INJECTION INTRAMUSCULAR ONCE
Status: COMPLETED | OUTPATIENT
Start: 2019-06-25 | End: 2019-06-25

## 2019-06-25 RX ORDER — LORAZEPAM 1 MG/1
2 TABLET ORAL EVERY 6 HOURS PRN
Status: DISCONTINUED | OUTPATIENT
Start: 2019-06-25 | End: 2019-06-27 | Stop reason: HOSPADM

## 2019-06-25 RX ORDER — INSULIN GLARGINE 100 [IU]/ML
50 INJECTION, SOLUTION SUBCUTANEOUS NIGHTLY
Status: DISCONTINUED | OUTPATIENT
Start: 2019-06-25 | End: 2019-06-27 | Stop reason: HOSPADM

## 2019-06-25 RX ORDER — FAMOTIDINE 20 MG/1
20 TABLET, FILM COATED ORAL 2 TIMES DAILY
Status: DISCONTINUED | OUTPATIENT
Start: 2019-06-25 | End: 2019-06-27 | Stop reason: HOSPADM

## 2019-06-25 RX ORDER — HYDROXYZINE HYDROCHLORIDE 25 MG/1
50 TABLET, FILM COATED ORAL EVERY 6 HOURS PRN
Status: DISCONTINUED | OUTPATIENT
Start: 2019-06-25 | End: 2019-06-27 | Stop reason: HOSPADM

## 2019-06-25 RX ORDER — HALOPERIDOL 5 MG/ML
5 INJECTION INTRAMUSCULAR EVERY 4 HOURS PRN
Status: CANCELLED | OUTPATIENT
Start: 2019-06-25

## 2019-06-25 RX ORDER — DEXTROSE MONOHYDRATE 50 MG/ML
100 INJECTION, SOLUTION INTRAVENOUS PRN
Status: CANCELLED | OUTPATIENT
Start: 2019-06-25

## 2019-06-25 RX ORDER — SODIUM CHLORIDE 0.9 % (FLUSH) 0.9 %
10 SYRINGE (ML) INJECTION PRN
Status: CANCELLED | OUTPATIENT
Start: 2019-06-25

## 2019-06-25 RX ORDER — LORAZEPAM 2 MG/ML
INJECTION INTRAMUSCULAR
Status: COMPLETED
Start: 2019-06-25 | End: 2019-06-25

## 2019-06-25 RX ORDER — INSULIN GLARGINE 100 [IU]/ML
50 INJECTION, SOLUTION SUBCUTANEOUS NIGHTLY
Status: CANCELLED | OUTPATIENT
Start: 2019-06-25

## 2019-06-25 RX ORDER — NALOXONE HYDROCHLORIDE 1 MG/ML
2 INJECTION INTRAMUSCULAR; INTRAVENOUS; SUBCUTANEOUS PRN
Status: CANCELLED | OUTPATIENT
Start: 2019-06-25

## 2019-06-25 RX ORDER — RISPERIDONE 0.5 MG/1
0.5 TABLET, FILM COATED ORAL 2 TIMES DAILY
Status: CANCELLED | OUTPATIENT
Start: 2019-06-25 | End: 2019-07-01

## 2019-06-25 RX ADMIN — PIOGLITAZONE 30 MG: 30 TABLET ORAL at 09:00

## 2019-06-25 RX ADMIN — FAMOTIDINE 20 MG: 20 TABLET ORAL at 20:09

## 2019-06-25 RX ADMIN — HALOPERIDOL LACTATE 5 MG: 5 INJECTION INTRAMUSCULAR at 02:21

## 2019-06-25 RX ADMIN — HALOPERIDOL 5 MG: 5 TABLET ORAL at 19:59

## 2019-06-25 RX ADMIN — CLOZAPINE 100 MG: 100 TABLET ORAL at 20:09

## 2019-06-25 RX ADMIN — METFORMIN HYDROCHLORIDE 850 MG: 850 TABLET ORAL at 08:58

## 2019-06-25 RX ADMIN — HALOPERIDOL LACTATE 5 MG: 5 INJECTION INTRAMUSCULAR at 09:49

## 2019-06-25 RX ADMIN — LORAZEPAM 2 MG: 2 INJECTION, SOLUTION INTRAMUSCULAR; INTRAVENOUS at 18:58

## 2019-06-25 RX ADMIN — INSULIN GLARGINE 50 UNITS: 100 INJECTION, SOLUTION SUBCUTANEOUS at 21:57

## 2019-06-25 RX ADMIN — CLOZAPINE 100 MG: 100 TABLET ORAL at 14:23

## 2019-06-25 RX ADMIN — ENOXAPARIN SODIUM 40 MG: 40 INJECTION SUBCUTANEOUS at 09:00

## 2019-06-25 RX ADMIN — RISPERIDONE 0.5 MG: 0.5 TABLET ORAL at 09:00

## 2019-06-25 RX ADMIN — INSULIN LISPRO 10 UNITS: 100 INJECTION, SOLUTION INTRAVENOUS; SUBCUTANEOUS at 16:57

## 2019-06-25 RX ADMIN — FAMOTIDINE 20 MG: 20 TABLET, FILM COATED ORAL at 09:00

## 2019-06-25 RX ADMIN — TRAZODONE HYDROCHLORIDE 50 MG: 50 TABLET ORAL at 20:09

## 2019-06-25 RX ADMIN — HALOPERIDOL LACTATE 5 MG: 5 INJECTION INTRAMUSCULAR at 18:08

## 2019-06-25 RX ADMIN — HYDROXYZINE HYDROCHLORIDE 50 MG: 50 INJECTION, SOLUTION INTRAMUSCULAR at 18:09

## 2019-06-25 RX ADMIN — LISINOPRIL 10 MG: 5 TABLET ORAL at 09:00

## 2019-06-25 RX ADMIN — LORAZEPAM 2 MG: 2 INJECTION INTRAMUSCULAR; INTRAVENOUS at 18:58

## 2019-06-25 RX ADMIN — INSULIN LISPRO 12 UNITS: 100 INJECTION, SOLUTION INTRAVENOUS; SUBCUTANEOUS at 08:55

## 2019-06-25 NOTE — PROGRESS NOTES
Neurology Follow up    SUBJECTIVE:  NO Headache, double vision,blurry vision,difficulty with speech,difficulty with swallowing,weakness,numbness,pain,nausea,vomitting,chocking,neck pain,dizziness, (not relaible )    PHYSICAL EXAM:    BP (!) 156/75   Pulse 102   Temp 97.9 °F (36.6 °C)   Resp 20   Ht 6' (1.829 m)   Wt 220 lb (99.8 kg)   SpO2 97%   BMI 29.84 kg/m²    General Appearance:      Mental Status Exam:             Level of Alertness:   awake            Orientation:   person, place, time                      Attention/Concentration:  normal            Language:  normal      Funduscopic Exam:     Cranial Nerves            Cranial nerve III           Pupils:  equal, round, reactive to light      Cranial nerves III, IV, VI           Extraocular Movements: intact      Cranial nerve V           Facial sensation:  intact      Cranial nerve VII           Facial strength: intact      Cranial nerve VIII           Hearing:  intact      Cranial nerve IX           Palate:  intact      Cranial nerve XI         Shoulder shrug:  intact      Cranial nerve XII          Tongue movement:  normal    Motor:    Drift:  absent  Motor exam is symmetrical 5 out of 5 all extremities bilaterally  Tone:  normal  Abnormal Movements:  absent            Sensory:        Pinprick             Right Upper Extremity:  normal             Left Upper Extremity:  normal             Right Lower Extremity:  normal             Left Lower Extremity:  normal           Vibration                         Touch            Proprioception                 Coordination:           Finger/Nose   Right:  normal              Left:  normal          Heel-Knee-Shin                Right:  normal              Left:  normal          Rapid Alternating Movements              Right:  normal              Left:  normal          Gait:                       Casual:  normal                         Romberg:  normal            Reflexes:             Deep Tendon Reflexes: --  264 324*  317* 242*     Recent Labs     06/23/19  1615   BILITOT <0.2   ALKPHOS 98   AST 19   ALT 27     No results found for: PROTIME, INR  No results found for: LITHIUM, DILFRTOT, VALPROATE    ASSESSMENT AND PLAN    Encephalopathy secondary to meds  Primary psych disorder  OK for [facility]    Vipul Clark MD, Julianne Richardson, American Board of Psychiatry & Neurology  Board Certified in Vascular Neurology  Board Certified in Neuromuscular Medicine  Certified in . [place]

## 2019-06-25 NOTE — CARE COORDINATION
I SPOKE TO [facility] RN HAO AND SHE STATED TO HAVE THE PT SIGN A VOLUNTARY SINCE HE DOES NOT HAVE A PINK SLIP. I EXPLAINED TO HER THE PT IS CONFUSED BUT SINCE  SHE STATED HE IS NOT DEEMED INCOMPETENT HE CAN SIGN. I ASKED THE PT AFTER  I EXPLAINED THE BH VOLUNTARY PT DID SIGN HIS FIRST NAME UNDER THE SIGNATURE  BUT WITH AN EXTRA L AND LAST NAME FURTHER DOWN THE PAPER WITH  ALSO A DRAWING OF A HEART WITH WRITING AND X'S AND DID NOT WANT TO GIVE UP THE PEN FOR A FEW SECONDS WHEN I ASKED HIM TO BUT THEN HE DID. I TUBED THIS VOLUNTARY TO 3[facility] AND PUT A STICKY NOTE WITH CONFIRMATION OF HIS CONFUSION STILL. I TOLD THE STAFF RN THAT WE MAY NEED A PINK SLIP FROM THE HOSPITALIST IF [facility] ASK UNLESS DR CANALES STILL IN THE BLDG. I TOLD THE STAFF RN THAT THE RN WILL CALL HER FOR REPORT.

## 2019-06-25 NOTE — PROGRESS NOTES
Endocrinology Progress Note    Assessment and Plan:   Assessment-  1. Type 2 insulin-dependent diabetes  2. Hyperglycemia  3. Bipolar schizoaffective disorder        Plan-  1. Continue Lantus 50 units at bedtime  2. Humalog 20 units 3 times daily with meals  3. Metformin 850 mg p.o. twice daily  4. Patient may be transferred to the behavioral health unit from endocrinology standpoint    POC Glucose:   Recent Labs     06/24/19  1707 06/24/19  1722 06/24/19  2217 06/25/19  0840 06/25/19  1205   POCGLU 485* 429* 258* 418* 194*     HGBA1C:  Recent Labs     06/25/19  0515   LABA1C 9.4*     CBC:   Recent Labs     06/23/19  1615 06/23/19  1850 06/25/19  0515   WBC 8.5  --  6.9   HGB 12.8* 13.1* 12.2*     --  144     CMP:    Recent Labs     06/23/19  1615 06/23/19  1850 06/23/19  1932 06/24/19  1304 06/25/19  0515   *  --   --  136  135 138   K 4.5  --   --  4.2  4.3 3.7   CL 96  --   --  100  99 102   CO2 23  --   --  20  21 23   BUN 9  --   --  10  10 10   CREATININE 0.72 0.7*  --  0.89  0.86 0.75   GLUCOSE 306*  --  264 324*  317* 242*   CALCIUM 9.1  --   --  9.0  9.3 8.8   LABGLOM >60.0 >60  --  >60.0  >60.0 >60.0         CC:   Chief Complaint   Patient presents with    Altered Mental Status     Sleepy and hard to arouse for the last few hours        Subjective: Interval History: Patient is a 75-year-old type 2 insulin-dependent diabetic who was admitted with mental status changes, found somnolent sleepy. Noted to be hyperglycemic, glycemic control is improving insulin dosing regiment.   Patient may be transferred to behavioral health unit from endocrinology standpoint    Review of systems: denies polyuria, polydipsia, ABD pain, flank pain, N/V/D, or diaphoresis  Medications:   Scheduled Meds:   cloZAPine  100 mg Oral BID    lisinopril  10 mg Oral Daily    insulin lispro  20 Units Subcutaneous TID WC    insulin glargine  50 Units Subcutaneous Nightly    metFORMIN  850 mg Oral BID WC   

## 2019-06-25 NOTE — PROGRESS NOTES
Patient has remained a 1 to 1 observation-- extremely intrusive/running in hallway/ grabbing food and other items from peers- given haldol and vistaril --- patient has continued demonstrate these behaviors- extremely difficult to redirect- uncooperative with staff at times- often needs 2 staff to encourage patient to remain in one spot for short period of time- restless/irriatable- minimal affect from medication.  Dr. Becca Cruz aware and orders recieved

## 2019-06-25 NOTE — DISCHARGE SUMMARY
MD   5 mg at 06/25/19 0221    risperiDONE (RISPERDAL) tablet 0.5 mg  0.5 mg Oral BID Ari Villatoro MD   0.5 mg at 06/25/19 0900    lisinopril (PRINIVIL;ZESTRIL) tablet 10 mg  10 mg Oral Daily Wendy Abreu MD   10 mg at 06/25/19 0900    insulin lispro (HUMALOG) injection vial 20 Units  20 Units Subcutaneous TID  Daquan Novoa MD   20 Units at 06/25/19 0858    insulin glargine (LANTUS) injection vial 50 Units  50 Units Subcutaneous Nightly Daquan Black MD   50 Units at 06/24/19 2200    metFORMIN (GLUCOPHAGE) tablet 850 mg  850 mg Oral BID  Daquan Black MD   850 mg at 06/25/19 0858    pioglitazone (ACTOS) tablet 30 mg  30 mg Oral Daily Daquan Black MD   30 mg at 06/25/19 0900    naloxone (NARCAN) injection 2 mg  2 mg Intravenous PRN Lizyt Genesis Montoya MD        sodium chloride flush 0.9 % injection 10 mL  10 mL Intravenous 2 times per day NORMAN Jenkins   10 mL at 06/23/19 2338    sodium chloride flush 0.9 % injection 10 mL  10 mL Intravenous PRN NORMAN Lopez        magnesium hydroxide (MILK OF MAGNESIA) 400 MG/5ML suspension 30 mL  30 mL Oral Daily PRN NORMAN Lopez        ondansetron [facility]) injection 4 mg  4 mg Intravenous Q6H PRN NORMAN Lopez        famotidine (PEPCID) tablet 20 mg  20 mg Oral BID NORMAN Lopez   20 mg at 06/25/19 0900    enoxaparin (LOVENOX) injection 40 mg  40 mg Subcutaneous Daily NORMAN Lopez   40 mg at 06/25/19 0900    0.9 % sodium chloride infusion   Intravenous Continuous NORMAN Jenkins 100 mL/hr at 06/23/19 2338      insulin lispro (HUMALOG) injection vial 0-12 Units  0-12 Units Subcutaneous TID [facility] Jason Jenkins   12 Units at 06/25/19 0855    insulin lispro (HUMALOG) injection vial 0-6 Units  0-6 Units Subcutaneous Nightly NORMAN Jenkins   3 Units at 06/24/19 2200         Discharge Exam:  HEENT: AT/NC, PERRLA, no JVD  HEART: s1/s2 wnl w/o s3  LUNG: clear  ABD: soft,

## 2019-06-25 NOTE — PROGRESS NOTES
Report received from [address] [place], [place] [address]. Approximately 1645 PM.  Pt arrived to [facility] at [phone] PM to room 385. accompanied by PCA 1:1.Pt immediately began running from chair to chair and table to table. He presented disorganized, difficult to redirect. Spoke in a whisper and mumbled. Upon arrival into the room he immediately picked up bag of toiletries and threw it on the sink and immediately picked up towels and gowns and threw them in his sink. This appears to be indicative of other behaviors exhibited in the milieu. Skin and contraband check done. '  Unable to do Admission paperwork or have consents signed or oriented to the unit secondary to Pt's behavior, mentation and presentation.

## 2019-06-26 LAB
GLUCOSE BLD-MCNC: 184 MG/DL (ref 60–115)
GLUCOSE BLD-MCNC: 201 MG/DL (ref 60–115)
GLUCOSE BLD-MCNC: 212 MG/DL (ref 60–115)
GLUCOSE BLD-MCNC: 376 MG/DL (ref 60–115)
GLUCOSE BLD-MCNC: 395 MG/DL (ref 60–115)
PERFORMED ON: ABNORMAL

## 2019-06-26 PROCEDURE — 99222 1ST HOSP IP/OBS MODERATE 55: CPT | Performed by: PSYCHIATRY & NEUROLOGY

## 2019-06-26 PROCEDURE — 1240000000 HC EMOTIONAL WELLNESS R&B

## 2019-06-26 PROCEDURE — 6370000000 HC RX 637 (ALT 250 FOR IP): Performed by: INTERNAL MEDICINE

## 2019-06-26 PROCEDURE — 6360000002 HC RX W HCPCS: Performed by: INTERNAL MEDICINE

## 2019-06-26 PROCEDURE — 6360000002 HC RX W HCPCS: Performed by: PSYCHIATRY & NEUROLOGY

## 2019-06-26 PROCEDURE — 6370000000 HC RX 637 (ALT 250 FOR IP): Performed by: PSYCHIATRY & NEUROLOGY

## 2019-06-26 RX ORDER — CLOZAPINE 100 MG/1
100 TABLET ORAL 4 TIMES DAILY
Status: DISCONTINUED | OUTPATIENT
Start: 2019-06-26 | End: 2019-06-26

## 2019-06-26 RX ORDER — CLOZAPINE 100 MG/1
100 TABLET ORAL 4 TIMES DAILY
Status: DISCONTINUED | OUTPATIENT
Start: 2019-06-26 | End: 2019-06-27 | Stop reason: HOSPADM

## 2019-06-26 RX ORDER — HALOPERIDOL DECANOATE 100 MG/ML
150 INJECTION INTRAMUSCULAR
COMMUNITY

## 2019-06-26 RX ADMIN — INSULIN LISPRO 20 UNITS: 100 INJECTION, SOLUTION INTRAVENOUS; SUBCUTANEOUS at 09:56

## 2019-06-26 RX ADMIN — METFORMIN HYDROCHLORIDE 850 MG: 850 TABLET ORAL at 18:01

## 2019-06-26 RX ADMIN — LORAZEPAM 2 MG: 2 INJECTION, SOLUTION INTRAMUSCULAR; INTRAVENOUS at 18:13

## 2019-06-26 RX ADMIN — INSULIN LISPRO 20 UNITS: 100 INJECTION, SOLUTION INTRAVENOUS; SUBCUTANEOUS at 17:56

## 2019-06-26 RX ADMIN — LISINOPRIL 10 MG: 10 TABLET ORAL at 09:12

## 2019-06-26 RX ADMIN — CLOZAPINE 100 MG: 100 TABLET ORAL at 09:12

## 2019-06-26 RX ADMIN — INSULIN GLARGINE 50 UNITS: 100 INJECTION, SOLUTION SUBCUTANEOUS at 22:36

## 2019-06-26 RX ADMIN — CLOZAPINE 100 MG: 100 TABLET ORAL at 13:03

## 2019-06-26 RX ADMIN — METFORMIN HYDROCHLORIDE 850 MG: 850 TABLET ORAL at 09:12

## 2019-06-26 RX ADMIN — HYDROXYZINE HYDROCHLORIDE 50 MG: 50 INJECTION, SOLUTION INTRAMUSCULAR at 05:20

## 2019-06-26 RX ADMIN — PIOGLITAZONE 30 MG: 30 TABLET ORAL at 09:12

## 2019-06-26 RX ADMIN — HYDROXYZINE HYDROCHLORIDE 50 MG: 25 TABLET, FILM COATED ORAL at 11:54

## 2019-06-26 RX ADMIN — HALOPERIDOL LACTATE 5 MG: 5 INJECTION INTRAMUSCULAR at 05:20

## 2019-06-26 RX ADMIN — INSULIN LISPRO 20 UNITS: 100 INJECTION, SOLUTION INTRAVENOUS; SUBCUTANEOUS at 13:03

## 2019-06-26 RX ADMIN — LORAZEPAM 2 MG: 1 TABLET ORAL at 10:37

## 2019-06-26 RX ADMIN — FAMOTIDINE 20 MG: 20 TABLET ORAL at 09:12

## 2019-06-26 RX ADMIN — CLOZAPINE 100 MG: 100 TABLET ORAL at 18:01

## 2019-06-26 ASSESSMENT — LIFESTYLE VARIABLES: HISTORY_ALCOHOL_USE: NO

## 2019-06-26 NOTE — PROGRESS NOTES
Rested briefly(less than 10 minutes)- in room- continues to run  About unit - non directable at times- either ignores requests or disregards requests.  Remains confused/disorganized/irritable/restless/uncooperaterd at times- medicated with ativan and haldol per orders of Dr. Ezequiel Ferrara

## 2019-06-26 NOTE — FLOWSHEET NOTE
One on one given throughout this evening. Patient communicates garbled speech with flight thoughts. When mumbling softly and this nurse stated could hear him, he stated clearly, \"your not suppose to\". When asked at that time if he's hearing voices \"yes, all the time. \" Does not answer what they are saying or if they are telling him to do something. 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 Very anxious, no aggression. Ate well for dinner. Laughs at inappropriate times. With numerous attempts patient eventually laid down in bed and went to sleep. He does not answer whether he is depressed or anxious. Santana not respond to SI, HI, or AVH. He states \"I am in a hospital and not a residential. \" But states it is Cody Favors.

## 2019-06-26 NOTE — CARE COORDINATION
Brief Intervention and Referral to Treatment Summary    Patient was provided PHQ-9, AUDIT and DAST Screening:      PHQ-9 Score: 0  AUDIT Score:  0  DAST Score:  0    Patients substance use is considered     Low Risk/Healthy x  Moderate Risk  Harmful  Dependent    Patients depression is considered:     Minimal x  Mild   Moderate  Moderately Severe  Severe    Brief Education Was Provided    Patient was receptive  Patient was not receptive x       Brief Intervention Is Provided (Only for AUDIT or DAST)     Patient reports readiness to decrease and/or stop use and a plan was discussed   Patient denies readiness to decrease and/or stop use and a plan was not discussed      Recommendations/Referrals for Brief and/or Specialized Treatment Provided to Patient   Patients tox was negative for drugs and alcohol. Patient will be transferred to [facility] upon discharge.   Electronically signed by Cinda Mitchell [facility] on 6/26/2019 at 2:32 PM

## 2019-06-26 NOTE — CARE COORDINATION
Spoke with Luci Ji at [facility] regarding patient. Patient has been with [place] [facility] since . He is his own guardian and has no family or friends involved with him. He is diagnosed with Schizophrenia. He does not have MR. At baseline he is delusional and grandiose. He thinks he is president Cheryle Bushy and walks/runs all the time. 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 He goes medical one time a year. He is able to go back to [place] [facility]. Whenever the doctor is ready to discharge him. His bed will  on  and then another precert will need to be completed. If it is before  they will need discharge notes and doctors notes faxed over. [facility] will fax over to our unit list of meds and diagnosis.   Electronically signed by Brittanie Contreras Accolo Anneliese on 2019 at 11:03 AM

## 2019-06-26 NOTE — PROGRESS NOTES
Pt. attended the 0900 community meeting.  Electronically signed by Joy Underwood on 6/26/2019 at 10:03 AM

## 2019-06-26 NOTE — PROGRESS NOTES
Group Therapy Note    Date: 6/26/19  Start Time: 7343  End Time:  8877    Number of Participants: 9    Type of Group: Psychoeducation    Patient's Goal: To participate in mood management group. Notes: Patient declined to attend psychoeducation group at  despite encouragement by staff.      Discipline Responsible: /Counselor    AMILCAR Leyva

## 2019-06-26 NOTE — H&P
Department of Psychiatry  History and Physical - Adult     CHIEF COMPLAINT:  Psychosis    History obtained from:  patient    Patient was seen after discussing with the treatment team and reviewing the chart\      HISTORY OF PRESENT ILLNESS:      The patient is a 52 y.o. male with significant past history of paranoid schizophrenia    Pt has a long h/o schizophrenia  Pt was send to [facility] for increase lethargy and somnolence  Initially got admitted to [facility] and then was admitted to medical floor  Pt is internally stimulated with incoherent speech  Pt is pacing the unit non stop and intrusive  Pt is getting close to other patient in the unit putting him at risk of getting assualted by others  Pt was living at [facility]  Pt is very difficult to redirect  Need PRN medication to calm him down  Speak in whispering tone during the interview    Stressors: poor physical health    The patient is currently receiving care for the above psychiatric illness. Medications Prior to Admission:   Medications Prior to Admission: cloZAPine (CLOZARIL) 100 MG tablet, Take 100 mg by mouth 4 times daily Indications: Psychosis   Acetaminophen-guaiFENesin 650-400 MG TABS, Take 650 mg by mouth  amantadine (SYMMETREL) 100 MG capsule, Take 100 mg by mouth  benztropine (COGENTIN) 2 MG tablet, Take 1 mg by mouth  insulin glargine (LANTUS) 100 UNIT/ML injection vial, Inject 48 Units into the skin  lisinopril (PRINIVIL;ZESTRIL) 10 MG tablet, Take 10 mg by mouth  magnesium hydroxide (MILK OF MAGNESIA) 400 MG/5ML suspension, Take 30 mLs by mouth  melatonin 3 MG TABS tablet, 6 mg  metFORMIN (GLUCOPHAGE) 850 MG tablet, Take 850 mg by mouth  insulin aspart (NOVOLOG) 100 UNIT/ML injection vial, Inject 100 Units into the skin 3 times daily (before meals) Indications: TID with meals along with bedtime.   pioglitazone (ACTOS) 30 MG tablet, Take 30 mg by mouth daily  Ergocalciferol (VITAMIN D2 PO), Take 50,000 Units by mouth Only on Monday's and Thursdays  OLANZapine (ZYPREXA) 10 MG injection, Inject into the muscle every 6 hours as needed for Agitation    Compliance:yes    Psychiatric Review of Systems      Unable to get any reliable answers\    Substance Abuse History:  ETOH: no   Marijuana: no  Opiates: no  Other Drugs: no      Past Psychiatric History:  Prior Diagnosis:  Schizophrenia; paranoid type  Psychiatrist: Yes- not sure about his name  Therapist:no  Hospitalization: yes  Hx of Suicidal Attempts: no  Hx of violence:  no  ECT: no      Past Medical History:        Diagnosis Date    Diabetes mellitus ([facility]     Hyperlipidemia     Hypertension     Schizophrenia ([facility]        Past Surgical History:    No past surgical history on file. Allergies:   Lithium; Nsaids; and Valproic acid    Family History  No family history on file. Social History:  Unable to obtain information    EXAMINATION:    REVIEW OF SYSTEMS:    ROS:  [x] All negative/unchanged except if checked.  Explain positive(checked items) below:  [] Constitutional  [] Eyes  [] Ear/Nose/Mouth/Throat  [] Respiratory  [] CV  [] GI  []   [] Musculoskeletal  [] Skin/Breast  [] Neurological  [] Endocrine  [] Heme/Lymph  [] Allergic/Immunologic    Explanation:     Vitals:  /80   Pulse 107   Temp 98 °F (36.7 °C)   Resp 18   SpO2 99%      Neurologic Exam:   Muscle Strength & Tone: full ROM  Gait: normal gait   Involuntary Movements: No    Mental Status Examination:    Level of consciousness:  within normal limits   Appearance:  ill-appearing  Behavior/Motor:  psychomotor agitation  Attitude toward examiner:  guarded  Speech:  whispered   Mood: anxious  Affect:  mood incongruent  Thought processes:  illogical and incoherent   Thought content:  Delusions:  paranoid  Perceptual Disturbance:  auditory  Cognition:  Unable to assess   Concentration poor  Memory unable to assess  Insight poor   Judgement poor   Fund of Knowledge limited    Mini Mental Status not Xr Chest Portable    Result Date: 6/24/2019  EXAMINATION: CHEST PORTABLE VIEW  CLINICAL HISTORY: Cough COMPARISONS: None  FINDINGS: Single  views of the chest is submitted. Limited exam due to low lung volume. The cardiac silhouette is of normal configuration. The mediastinum is unremarkable. Pulmonary vascular unremarkable. Right sided trachea. No focal infiltrates. No Pneumothoraces. NO ACUTE ACTIVE CARDIOPULMONARY PROCESS      EKG: TRACING REVIEWED    TREATMENT PLAN:    Risk Management:  close watch and suicide risk    Collateral Information:  Will obtain collateral information from the family or friends. Will obtain medical records as appropriate from out patient providers  Will consult the hospitalist for a physical exam to rule out any co-morbid physical condition. Home medication Reconciled       New Medications started during this admission :    See orders  Prn Haldol 5mg and Vistaril 50mg q6hr for extreme agitation. Trazodone as ordered for insomnia  Vistaril as ordered for anxiety  Discussed with the patient risk, benefit, alternative and common side effects for the  proposed medication treatment. Patient is consenting to the treatment. Psychotherapy:   Encourage participation in milieu and group therapy  Individual therapy as needed        Behavioral Services  Medicare Certification      Admission Day 1  I certify that this patient's inpatient psychiatric hospital admission is medically necessary for:     (1) treatment which could reasonably be expected to improve this patient's condition, or     (2) diagnostic study or its equivalent.        Electronically signed by Chaka Singleton MD on 6/26/2019 at 10:10 AM

## 2019-06-26 NOTE — FLOWSHEET NOTE
1:1 nursing care maintained with PCA. Pt is difficult to assess because he is paranoid and internally stimulated. He continues to pace unit and be intrusive . Needs frequently redirection by staff. Pt noted to be talking to himself and laughing. He refused physical assessment.  Appetite is normal for breakfast.

## 2019-06-26 NOTE — PROGRESS NOTES
[facility] verbalized pt had haloperidol decanoate received on may 9th.  Pt clozaril 300mg po BID, both up to date in home med list.

## 2019-06-26 NOTE — CARE COORDINATION
BHI Biopsychosocial Assessment    Current Level of Psychosocial Functioning     Independent   Dependent  x  Minimal Assist     Comments:  Patient is on a one to one. Has been a resident at [place] since 2004 and will return there for long term placement. Psychosocial High Risk Factors (check all that apply)    Unable to obtain meds   Chronic illness/pain    Substance abuse   Lack of Family Support x  Financial stress   Isolation   Inadequate Community Resources  Suicide attempt(s)  Not taking medications   Victim of crime   Developmental Delay  Unable to manage personal needs  x  Age 72 or older   Homeless  No transportation   Readmission within 30 days  Unemployment  Traumatic Event    Comments:   Sexual Orientation:  KEE    Patient Strengths:KEE    Patient Barriers: at baseline patient is delusional and grandiose. Plan of Care     medication management, group/individual therapies, family meetings, psycho -education, treatment team meetings to assist with stabilization    Initial Discharge Plan: To transfer back to [place] upon discharge. Clinical Summary:  Was not able to complete assessment with patient. Information was taken from chart and obtained from contact with [place].   Electronically signed by Jonny Armstrong ChatterPlug Anneliese on 6/26/2019 at 3:36 PM

## 2019-06-26 NOTE — PROGRESS NOTES
Pt awake, cleaned after incontinence of large amount of urine. Pt begins to run down the halls, not redirectable, mumbling to himself. He insists that we are at [facility] in the education center. When pt informed that he is in the hospital, he shakes his head and mumbles more quietly. He grabs at various items while he moves swiftly across the unit, appearing internally stimulated. Security called pt accepted prn haldol im and vistaril im without incident.

## 2019-06-26 NOTE — PROGRESS NOTES
Pt. refused to attend the 1000 skills group, despite staff encouragement.  Electronically signed by Jon Montelongo on 6/26/2019 at 12:15 PM

## 2019-06-26 NOTE — PROGRESS NOTES
Pt. presents confused, restless, easily distracted. In constant motion and on the move. Pt. is hard to redirect and very intrusive with others. Incoherent speech, mumbles and whispers. Appears to be internally stimulated. Unable to accurately assess pt. at this time. As  condition improves, pt. will be encouraged to attend all  unit groups and activities.  Electronically signed by Werner Sierra on 6/26/2019 at 1:26 PM

## 2019-06-26 NOTE — FLOWSHEET NOTE
Remains on 1:1 nursing care. Pt became impulsive, agitated with staff, running in hallways and not taking direction from staff. Medicated with Ativan 2mg IM.

## 2019-06-26 NOTE — CONSULTS
[facility]  MEDICINE    HISTORY AND PHYSICAL EXAM    PATIENT NAME:  La Avina    MRN:  [id number]  SERVICE DATE:  6/26/2019   SERVICE TIME:  2:09 PM    Primary Care Physician: No primary care provider on file. SUBJECTIVE  CHIEF COMPLAINT:  Medical clear for inpatient psychiatry admission. Consult for medical H/P encounter. HPI:    This is a 52 y.o. male admitted to [facility] after he was managed on the medical floor for increased lethargy and somnolence which was attributed to posible effect of medication as he was reportedly medicated with clonazepam and Risperdal before presenting in the ED. Pt awake at this time , oriented to time and place, however mumbles quietly when responding to questions asked and sentences also non meaningful at times. Pt however denies any SOB, CP, N/V, fever, chills, constipation or diarrhea. PAST MEDICAL HISTORY:    Past Medical History:   Diagnosis Date    [facility] mellitus ([facility]     Hyperlipidemia     Hypertension     [facility]      PAST SURGICAL HISTORY:  No past surgical history on file. FAMILY HISTORY:  No family history on file. SOCIAL HISTORY:    Social History     Socioeconomic History    Marital status: Single     Spouse name: Not on file    Number of children: Not on file    Years of education: Not on file    Highest education level: Not on file   Occupational History    Not on file   Social Needs    Financial resource strain: Not on file    Food insecurity:     Worry: Not on file     Inability: Not on file    Transportation needs:     Medical: Not on file     Non-medical: Not on file   Tobacco Use    Smoking status: Not on file   Substance and Sexual Activity    Alcohol use:  Yes    Drug use: Not on file    Sexual activity: Not on file   Lifestyle    Physical activity:     Days per week: Not on file     Minutes per session: Not on file    Stress: Not on file   Relationships    Social connections:     Talks on phone: Not on file     Gets together: Not on file     Attends [employer] service: Not on file     Active member of club or organization: Not on file     Attends meetings of clubs or organizations: Not on file     Relationship status: Not on file    Intimate partner violence:     Fear of current or ex partner: Not on file     Emotionally abused: Not on file     Physically abused: Not on file     Forced sexual activity: Not on file   Other Topics Concern    Not on file   Social History Narrative    Not on file     MEDICATIONS:    Current Facility-Administered Medications   Medication Dose Route Frequency Provider Last Rate Last Dose    cloZAPine (CLOZARIL) tablet 100 mg  100 mg Oral 4x Daily Laverne Bob MD   100 mg at 06/26/19 1303    acetaminophen (TYLENOL) tablet 650 mg  650 mg Oral Q4H PRN Laverne Bob MD        aluminum & magnesium hydroxide-simethicone (MAALOX) 200-200-20 MG/5ML suspension 30 mL  30 mL Oral PRN Laverne Bob MD        benztropine mesylate (COGENTIN) injection 2 mg  2 mg Intramuscular BID PRN Laverne Bob MD        traZODone (DESYREL) tablet 50 mg  50 mg Oral Nightly PRN Laverne Bob MD   50 mg at 06/25/19 2009    famotidine (PEPCID) tablet 20 mg  20 mg Oral BID Jenifer Powell MD   20 mg at 06/26/19 0912    magnesium hydroxide (MILK OF MAGNESIA) 400 MG/5ML suspension 30 mL  30 mL Oral Daily PRN Jenifer Powell MD        sodium chloride flush 0.9 % injection 10 mL  10 mL Intravenous 2 times per day Jenifer Powell MD        dextrose 50 % IV solution  12.5 g Intravenous PRN Jenifer Powell MD        glucagon (rDNA) injection 1 mg  1 mg Intramuscular PRN Jenifer Powell MD        glucose (GLUTOSE) 40 % oral gel 15 g  15 g Oral PRN Jenifer Powell MD        haloperidol lactate (HALDOL) injection 5 mg  5 mg Intramuscular Q4H PRN Jenifer Powell MD   5 mg at 06/26/19 0520    insulin glargine (LANTUS) injection vial 50 Units  50 Units Subcutaneous Nightly Jenifer Powell MD   50 Units at 06/25/19 2157    insulin lispro (HUMALOG) injection vial 0-12 Units  0-12 Units Subcutaneous TID  Raphael Barron MD   4 Units at 06/26/19 1306    insulin lispro (HUMALOG) injection vial 0-6 Units  0-6 Units Subcutaneous Nightly Raphael Barron MD   3 Units at 06/25/19 2158    insulin lispro (HUMALOG) injection vial 20 Units  20 Units Subcutaneous TID  Raphael Barron MD   20 Units at 06/26/19 1303    lisinopril (PRINIVIL;ZESTRIL) tablet 10 mg  10 mg Oral Daily Rapahel Barron MD   10 mg at 06/26/19 0912    metFORMIN (GLUCOPHAGE) tablet 850 mg  850 mg Oral BID  Raphael Barron MD   850 mg at 06/26/19 0912    pioglitazone (ACTOS) tablet 30 mg  30 mg Oral Daily Raphael Barron MD   30 mg at 06/26/19 0912    hydrOXYzine (VISTARIL) injection 50 mg  50 mg Intramuscular Q6H PRN Keren Canales MD   50 mg at 06/26/19 0520    Or    hydrOXYzine (ATARAX) tablet 50 mg  50 mg Oral Q6H PRN Keren Canales MD   50 mg at 06/26/19 1154    LORazepam (ATIVAN) injection 2 mg  2 mg Intramuscular Q6H PRN Keren Canales MD   2 mg at 06/25/19 1858    Or    LORazepam (ATIVAN) tablet 2 mg  2 mg Oral Q6H PRN Keren Canales MD   2 mg at 06/26/19 1037       ALLERGIES: Lithium; Nsaids; and Valproic acid    REVIEW OF SYSTEM:   ROS as noted in HPI, 12 point ROS reviewed and otherwise negative. OBJECTIVE  PHYSICAL EXAM: /80   Pulse 107   Temp 98 °F (36.7 °C)   Resp 18   SpO2 99%   CONSTITUTIONAL:  awake, alert, cooperative,   EYES:  Lids and lashes normal, pupils equal, round and reactive to light, extra ocular muscles intact, sclera clear, conjunctiva normal  ENT:  Normocephalic, without obvious abnormality, atraumatic, sinuses nontender on palpation, external ears without lesions, oral pharynx with moist mucus membranes, tonsils without erythema or exudates, gums normal and good dentition.   NECK:  Supple, symmetrical, trachea midline, no adenopathy, thyroid symmetric, not enlarged and no tenderness, skin normal  LUNGS: No increased work of breathing, good air exchange, clear to auscultation bilaterally, no crackles or wheezing  CARDIOVASCULAR:  Normal apical impulse, regular rate and rhythm, normal S1 and S2, no S3 or S4, and no murmur noted  ABDOMEN:  No scars, normal bowel sounds, soft, non-distended, non-tender, no masses palpated, no hepatosplenomegally  MUSCULOSKELETAL:  There is no redness, warmth, or swelling of the joints. Full range of motion noted. Motor strength is 5 out of 5 all extremities bilaterally. Tone is normal.  NEUROLOGIC:  Awake, alert, oriented to name, place and time. SKIN:  no bruising or bleeding, normal skin color, texture, turgor, no redness, warmth, or swelling and no jaundice    DATA:     Diagnostic tests reviewed for today's visit:    Most recent labs and imaging results reviewed. ASSESSMENT AND PLAN  [facility] (6/25/2019): continue current medication and management by psychiatrist    HTN: continue antihypertensives with b/p monitoring     DM II: POCT glucose with SSI    VTE Prophylaxis: pt ambulatory   Code status: full      This is only a history and physical examination and not medical management. The patient is to contact and follow up with their primary care physician and go over any abnormal labs, imaging, findings, medical concerns, or conditions that we have and have not addressed during this encounter. Plan of care discussed with: patient    This is only a history and physical examination and not medical management. The patient is to contact and follow up with their primary care physician and go over any abnormal labs, imaging, findings, medical concerns, or conditions that we have and have not addressed during this encounter.     SIGNATURE: MARY GRACE Herzog CNP  DATE: June 26, 2019  TIME: 2:09 PM

## 2019-06-27 VITALS
HEART RATE: 112 BPM | TEMPERATURE: 98.9 F | DIASTOLIC BLOOD PRESSURE: 83 MMHG | SYSTOLIC BLOOD PRESSURE: 129 MMHG | RESPIRATION RATE: 18 BRPM | OXYGEN SATURATION: 99 %

## 2019-06-27 LAB
GLUCOSE BLD-MCNC: 210 MG/DL (ref 60–115)
GLUCOSE BLD-MCNC: 227 MG/DL (ref 60–115)
GLUCOSE BLD-MCNC: 275 MG/DL (ref 60–115)
PERFORMED ON: ABNORMAL

## 2019-06-27 PROCEDURE — 99239 HOSP IP/OBS DSCHRG MGMT >30: CPT | Performed by: PSYCHIATRY & NEUROLOGY

## 2019-06-27 PROCEDURE — 6370000000 HC RX 637 (ALT 250 FOR IP): Performed by: PSYCHIATRY & NEUROLOGY

## 2019-06-27 PROCEDURE — 6370000000 HC RX 637 (ALT 250 FOR IP): Performed by: INTERNAL MEDICINE

## 2019-06-27 RX ORDER — INSULIN GLARGINE 100 [IU]/ML
50 INJECTION, SOLUTION SUBCUTANEOUS NIGHTLY
Qty: 1 VIAL | Refills: 3 | DISCHARGE
Start: 2019-06-27

## 2019-06-27 RX ADMIN — INSULIN LISPRO 20 UNITS: 100 INJECTION, SOLUTION INTRAVENOUS; SUBCUTANEOUS at 08:26

## 2019-06-27 RX ADMIN — METFORMIN HYDROCHLORIDE 850 MG: 850 TABLET ORAL at 08:17

## 2019-06-27 RX ADMIN — HYDROXYZINE HYDROCHLORIDE 50 MG: 25 TABLET, FILM COATED ORAL at 08:25

## 2019-06-27 RX ADMIN — PIOGLITAZONE 30 MG: 30 TABLET ORAL at 08:17

## 2019-06-27 RX ADMIN — CLOZAPINE 100 MG: 100 TABLET ORAL at 08:17

## 2019-06-27 RX ADMIN — LISINOPRIL 10 MG: 10 TABLET ORAL at 08:17

## 2019-06-27 RX ADMIN — LORAZEPAM 2 MG: 1 TABLET ORAL at 16:40

## 2019-06-27 RX ADMIN — CLOZAPINE 100 MG: 100 TABLET ORAL at 12:32

## 2019-06-27 RX ADMIN — CLOZAPINE 100 MG: 100 TABLET ORAL at 16:40

## 2019-06-27 RX ADMIN — METFORMIN HYDROCHLORIDE 850 MG: 850 TABLET ORAL at 16:40

## 2019-06-27 RX ADMIN — INSULIN LISPRO 20 UNITS: 100 INJECTION, SOLUTION INTRAVENOUS; SUBCUTANEOUS at 17:31

## 2019-06-27 RX ADMIN — INSULIN LISPRO 20 UNITS: 100 INJECTION, SOLUTION INTRAVENOUS; SUBCUTANEOUS at 12:30

## 2019-06-27 RX ADMIN — FAMOTIDINE 20 MG: 20 TABLET ORAL at 08:17

## 2019-06-27 NOTE — PROGRESS NOTES
Patient visible on unit at times. Pt paces around unit, not social with peers. Pt attended group. Pt has flat affect. Pt heard mumbling to self a times. Pt non verbal with this nurse, not answering any questions. Pt follows commands. Pt allowed physical assessment. Pt intrusive at desk at times, attempting to grab things behind desk. 1:1 continues for pt's safety.  Electronically signed by Giovany Meadows RN on 6/27/19 at 11:42 AM

## 2019-06-27 NOTE — PROGRESS NOTES
Affect and mood stable   no voiced suicidal/homicidal ideation  Cooperative with [facility]/ declined to sign discharge papers  D/c to [facility]

## 2019-06-27 NOTE — PROGRESS NOTES
Pt sleeping, incontinent of large amount of urine. Pt cleaned and changed with assistance of this nurse, lpn, and pca. No other needs voiced or observed at this time.  1:1 maintained

## 2019-06-27 NOTE — PROGRESS NOTES
Pt. attended the 0900 community meeting.  Electronically signed by Farshad Abel on 6/27/2019 at 9:31 AM

## 2019-06-27 NOTE — DISCHARGE SUMMARY
DISCHARGE SUMMARY      Patient ID:  Jackie Lepe  [id number]  52 y.o.  1965    Admit date: 6/25/2019    Discharge date and time: 6/27/2019    Admitting Physician: Jaymie Davis MD     Discharge Physician: Dr Delma Zhou MD    Admission Diagnoses: Paranoid schizophrenia [F20.0]  Paranoid schizophrenia ([address] [F20.0]    Admission Condition: poor    Discharged Condition: stable    Admission Circumstance:      The patient is a 52 y.o. male with significant past history of paranoid schizophrenia     Pt has a long h/o schizophrenia  Pt was send to [facility] for increase lethargy and somnolence  Initially got admitted to [facility] and then was admitted to medical floor  Pt is internally stimulated with incoherent speech  Pt is pacing the unit non stop and intrusive  Pt is getting close to other patient in the unit putting him at risk of getting assualted by others  Pt was [facility] at [facility]  Pt is very difficult to redirect  Need PRN medication to calm him down  Speak in whispering tone during the interview     Stressors: poor physical health     The patient is currently receiving care for the above psychiatric illness.     Medications Prior to Admission:     Prescriptions Prior to Admission   Medications Prior to Admission: cloZAPine (CLOZARIL) 100 MG tablet, Take 100 mg by mouth 4 times daily Indications: Psychosis   Acetaminophen-guaiFENesin 650-400 MG TABS, Take 650 mg by mouth  amantadine (SYMMETREL) 100 MG capsule, Take 100 mg by mouth  benztropine (COGENTIN) 2 MG tablet, Take 1 mg by mouth  insulin glargine (LANTUS) 100 UNIT/ML injection vial, Inject 48 Units into the skin  lisinopril (PRINIVIL;ZESTRIL) 10 MG tablet, Take 10 mg by mouth  magnesium hydroxide (MILK OF MAGNESIA) 400 MG/5ML suspension, Take 30 mLs by mouth  melatonin 3 MG TABS tablet, 6 mg  metFORMIN (GLUCOPHAGE) 850 MG tablet, Take 850 mg by mouth  insulin aspart (NOVOLOG) 100 UNIT/ML injection vial, Inject 100 Units into the skin 3 times daily (before meals) Indications: TID with meals along with bedtime. pioglitazone (ACTOS) 30 MG tablet, Take 30 mg by mouth daily  Ergocalciferol (VITAMIN D2 PO), Take 50,000 Units by mouth Only on Monday's and Thursdays  OLANZapine (ZYPREXA) 10 MG injection, Inject into the muscle every 6 hours as needed for Agitation        Compliance:yes     Psychiatric Review of Systems      Unable to get any reliable answers\     Substance Abuse History:  ETOH: no   Marijuana: no  Opiates: no  Other Drugs: no        Past Psychiatric History:  Prior Diagnosis:  Schizophrenia; paranoid type  Psychiatrist: Yes- not sure about his name  Therapist:no  Hospitalization: yes  Hx of Suicidal Attempts: no  Hx of violence:  no  ECT: no       PAST MEDICAL/PSYCHIATRIC HISTORY:   Past Medical History:   Diagnosis Date    [facility]     Hyperlipidemia     Hypertension     [facility]        FAMILY/SOCIAL HISTORY:  No family history on file. Social History     Socioeconomic History    Marital status: Single     Spouse name: Not on file    Number of children: Not on file    Years of education: Not on file    Highest education level: Not on file   Occupational History    Not on file   Social Needs    Financial resource strain: Not on file    Food insecurity:     Worry: Not on file     Inability: Not on file    Transportation needs:     Medical: Not on file     Non-medical: Not on file   Tobacco Use    Smoking status: Not on file   Substance and Sexual Activity    Alcohol use:  Yes    Drug use: Not on file    Sexual activity: Not on file   Lifestyle    Physical activity:     Days per week: Not on file     Minutes per session: Not on file    Stress: Not on file   Relationships    Social connections:     Talks on phone: Not on file     Gets together: Not on file     Attends Hoahaoism service: Not on file     Active member of club or organization: Not on file     Attends meetings of clubs or organizations: Not on file     Relationship status: Not on file    Intimate partner violence:     Fear of current or ex partner: Not on file     Emotionally abused: Not on file     Physically abused: Not on file     Forced sexual activity: Not on file   Other Topics Concern    Not on file   Social History Narrative    Not on file       MEDICATIONS:    Current Facility-Administered Medications:     cloZAPine (CLOZARIL) tablet 100 mg, 100 mg, Oral, 4x Daily, Edwin Browne MD, 100 mg at 06/27/19 0817    acetaminophen (TYLENOL) tablet 650 mg, 650 mg, Oral, Q4H PRN, Edwin Browne MD    aluminum & magnesium hydroxide-simethicone (MAALOX) 200-200-20 MG/5ML suspension 30 mL, 30 mL, Oral, PRN, Edwin Browne MD    benztropine mesylate (COGENTIN) injection 2 mg, 2 mg, Intramuscular, BID PRN, Edwin Browne MD    traZODone (DESYREL) tablet 50 mg, 50 mg, Oral, Nightly PRN, Edwin Browne MD, 50 mg at 06/25/19 2009    famotidine (PEPCID) tablet 20 mg, 20 mg, Oral, BID, Lesly Martinez MD, 20 mg at 06/27/19 0817    magnesium hydroxide (MILK OF MAGNESIA) 400 MG/5ML suspension 30 mL, 30 mL, Oral, Daily PRN, Lesly Martinez MD    dextrose 50 % IV solution, 12.5 g, Intravenous, PRN, Lesly Martinez MD    glucagon (rDNA) injection 1 mg, 1 mg, Intramuscular, PRN, Lesly Martinez MD    glucose (GLUTOSE) 40 % oral gel 15 g, 15 g, Oral, PRN, Lesly Martinez MD    haloperidol lactate (HALDOL) injection 5 mg, 5 mg, Intramuscular, Q4H PRN, Lesly Martinez MD, 5 mg at 06/26/19 0520    insulin glargine (LANTUS) injection vial 50 Units, 50 Units, Subcutaneous, Nightly, Lesly Martinez MD, 50 Units at 06/26/19 2236    insulin lispro (HUMALOG) injection vial 0-12 Units, 0-12 Units, Subcutaneous, TID WC, Lesly Martinez MD, 4 Units at 06/27/19 0829    insulin lispro (HUMALOG) injection vial 0-6 Units, 0-6 Units, Subcutaneous, Nightly, Lesly Martinez MD, 1 Units at 06/26/19 8969    insulin lispro (HUMALOG) injection vial 20 Units, 20 Units, Subcutaneous, TID Carlos ESPINOSA MD, 20 Units at 06/27/19 0826    lisinopril (PRINIVIL;ZESTRIL) tablet 10 mg, 10 mg, Oral, Daily, Carlos Orellana MD, 10 mg at 06/27/19 0817    metFORMIN (GLUCOPHAGE) tablet 850 mg, 850 mg, Oral, BID Carlos ESPINOSA MD, 850 mg at 06/27/19 0817    pioglitazone (ACTOS) tablet 30 mg, 30 mg, Oral, Daily, Carlos Orellana MD, 30 mg at 06/27/19 0817    hydrOXYzine (VISTARIL) injection 50 mg, 50 mg, Intramuscular, Q6H PRN, 50 mg at 06/26/19 0520 **OR** hydrOXYzine (ATARAX) tablet 50 mg, 50 mg, Oral, Q6H PRN, Meryle Pipes, MD, 50 mg at 06/27/19 0825    LORazepam (ATIVAN) injection 2 mg, 2 mg, Intramuscular, Q6H PRN, 2 mg at 06/26/19 1813 **OR** LORazepam (ATIVAN) tablet 2 mg, 2 mg, Oral, Q6H PRN, Meryle Pipes, MD, 2 mg at 06/26/19 1037    Examination:  BP (!) 153/74   Pulse 119   Temp 99 °F (37.2 °C) (Oral)   Resp 18   SpO2 97%   Gait - steady    HOSPITAL COURSE[de-identified]  Following admission to the hospital, patient had a complete physical exam and blood work up  Patient was monitored closely with suicide precaution  Patient was started on medication as ordered  Was encouraged to participate in group and other milieu activity  Patient started to feel better with this combination of treatment. Significant progress in the symptoms since admission.     Mood better, with the score of 2/10 - bad  Baseline AVH or paranoid thoughts  No Hopeless or worthless feeling  No active SI/HI    Pt was not somnolent  He continue to pace the unit    Mental Status Examination on discharge:    Level of consciousness:  within normal limits   Appearance:  well-appearing  Behavior/Motor:  no abnormalities noted  Attitude toward examiner:  attentive and good eye contact  Speech:  spontaneous, normal rate and normal volume   Mood: anxious  Affect:  mood incongruent  Thought processes:  illogical   Thought content:  Delusions:  no evidence of delusions  Perceptual Disturbance:  auditory  Cognition:  oriented to person, place, and time   Concentration intact  Memory intact  Insight good   Judgement fair   Fund of Knowledge adequate      ASSESSMENT:  Patient symptoms are:  [] Well controlled  [x] Improving  [] Worsening  [] No change      Diagnosis:  Principal Problem:    Paranoid schizophrenia ([facility]  Resolved Problems:    * No resolved hospital problems. *      LABS:    Recent Labs     06/25/19  0515   WBC 6.9   HGB 12.2*        Recent Labs     06/24/19  1304 06/25/19  0515     135 138   K 4.2  4.3 3.7     99 102   CO2 20  21 23   BUN 10  10 10   CREATININE 0.89  0.86 0.75   GLUCOSE 324*  317* 242*     No results for input(s): BILITOT, ALKPHOS, AST, ALT in the last 72 hours. Lab Results   Component Value Date    LABAMPH Neg 06/23/2019    BARBSCNU Neg 06/23/2019    LABBENZ Neg 06/23/2019    LABMETH Neg 06/23/2019    OPIATESCREENURINE Neg 06/23/2019    PHENCYCLIDINESCREENURINE Neg 06/23/2019    ETOH <10 06/23/2019     Lab Results   Component Value Date    TSH 1.120 06/23/2019     No results found for: LITHIUM  No results found for: VALPROATE, CBMZ    RISK ASSESSMENT AT DISCHARGE: Low risk for suicide and homicide. Treatment Plan:  Reviewed current Medications with the patient. Education provided on the complaince with treatment. Risks, benefits, side effects, drug-to-drug interactions and alternatives to treatment were discussed. Encourage patient to attend outpatient follow up appointment and therapy. Patient was advised to call the outpatient provider, visit the nearest ED or call 911 if symptoms are not manageable.      Patient's family member was contacted prior to the discharge.         Medication List      CONTINUE taking these medications    CLOZARIL 100 MG tablet  Generic drug:  cloZAPine     haloperidol decanoate 100 MG/ML injection  Commonly known as:  HALDOL DECANOATE     melatonin 3 MG Tabs tablet        STOP taking these medications    Acetaminophen-guaiFENesin 650-400 MG

## 2019-06-27 NOTE — PROGRESS NOTES
Pt. refused to attend the 1000 skills group, despite staff encouragement.  Electronically signed by Simon Long on 6/27/2019 at 11:55 AM

## 2019-06-27 NOTE — CARE COORDINATION
Spoke with Bridgett Blevins from [place] that patient is ready to be discharged today. Bridgett Blevins said to call her back when we have a time that [facility] is transporting him. [facility] [facility]  Admitting doctor is Dr. Jenny Gutierrez # is [phone]  Call [phone] and ask for 3rd floor nurse for nurse to nurse report.    Fax over discharge papers and notes to [phone]  Electronically signed by Emilio Abernathy [facility] on 6/27/2019 at 9:20 AM

## 2019-06-27 NOTE — DISCHARGE INSTR - COC
Continuity of Care Form    Patient Name: Megan Nova   :  1965  MRN:  [id number]    Admit date:  2019  Discharge date:  19      Code Status Order: Full Code   Advance Directives:   [facility] Documentation     Date/Time Healthcare Directive Type of Healthcare Directive Copy in [facility] [address] Agent's Name Healthcare Agent's Phone Number    19 1245  No, patient does not have an advance directive for healthcare treatment -- -- -- -- --          Admitting Physician:  Herminia Mcghee MD  PCP: No primary care provider on file. Discharging Nurse: [facility] Unit/Room#: [address] Unit Phone Number: [phone]    Emergency Contact:   Extended Emergency Contact Information  Primary Emergency Contact: kevin ackerman  Home Phone: [phone]  Mobile Phone: [phone]  Relation: Brother/Sister   needed? No    Past Surgical History:  No past surgical history on file. Immunization History: There is no immunization history on file for this patient.     Active Problems:  Patient Active Problem List   Diagnosis Code    Altered mental status R41.82    Uncontrolled type 2 diabetes mellitus with hyperglycemia ([facility] E11.65    Paranoid schizophrenia ([facility] F20.0       Isolation/Infection:   Isolation          No Isolation            Nurse Assessment:  Last Vital Signs: BP (!) 153/74   Pulse 119   Temp 99 °F (37.2 °C) (Oral)   Resp 18   SpO2 97%     Last documented pain score (0-10 scale):    Last Weight:   Wt Readings from Last 1 Encounters:   19 220 lb (99.8 kg)     Mental Status:  disoriented and alert    IV Access:  - None    Nursing Mobility/ADLs:  Walking   Independent  Transfer  Independent  Bathing  Independent  Dressing  Independent  [address]  Dependent  Med Delivery   whole    Wound Care Documentation and Therapy: Elimination:  Continence:   · Bowel: Yes  · Bladder: Yes  Urinary Catheter: None   Colostomy/Ileostomy/Ileal Conduit: No       Date of Last BM: 6/26/19    No intake or output data in the 24 hours ending 06/27/19 0808  No intake/output data recorded. Safety Concerns:         Impairments/Disabilities:      mental health issues/schizophrenia    Nutrition Therapy:  Current Nutrition Therapy:   - Oral Diet:  General    Routes of Feeding: Oral  Liquids: No Restrictions  Daily Fluid Restriction: no  Last Modified Barium Swallow with Video (Video Swallowing Test): not done    Treatments at the Time of Hospital Discharge:   Respiratory Treatments: *n/a**  Oxygen Therapy:  is not on home oxygen therapy. Ventilator:    - No ventilator support    Rehab Therapies: n/a  Weight Bearing Status/Restrictions: No weight bearing restirctions  Other Medical Equipment (for information only, NOT a DME order):  n/a  Other Treatments: n/a    Patient's personal belongings (please select all that are sent with patient):  see check sheet attached    RN SIGNATURE:  {Esignature:[id number]}    CASE MANAGEMENT/SOCIAL WORK SECTION    Inpatient Status Date: 6/25/2019    Readmission Risk Assessment Score:  Readmission Risk              Risk of Unplanned Readmission:        14           Discharging to Facility/ Agency   · Name: [facility]  · [address] [place] [address]. [address]  · Phone: [phone]  · Fax: [phone]    Dialysis Facility (if applicable)   · Name:  · Address:  · Dialysis Schedule:  · Phone:  · Fax:    / signature: Electronically signed by Esther [address] [place] [address] on 6/27/19 at 9:32 AM    PHYSICIAN SECTION    Prognosis: Guarded    Condition at Discharge: Stable    Rehab Potential (if transferring to Rehab): Guarded    Recommended Labs or Other Treatments After Discharge: monthly CBC    Physician Certification: I certify the above information and transfer of Jenn Liang Panchito Colmenares  is necessary for the continuing treatment of the diagnosis listed and that he requires [place] for greater 30 days.      Update Admission H&P: No change in H&P    PHYSICIAN SIGNATURE:  Electronically signed by Jessica Choudhury MD on 6/27/19 at 9:34 AM

## 2019-06-27 NOTE — PROGRESS NOTES
Report given to nurse-Garcia-at [facility].  Electronically signed by Ayo Moon RN on 6/27/19 at 2:27 PM

## 2019-06-27 NOTE — GROUP NOTE
Group Therapy Note    Date: June 27    Group Start Time: 1100  Group End Time: 1200  Group Topic: Psychotherapy    [facility] 3W I    Tay Shoulder        Group Therapy Note    Attendees: 14         Patient's Goal:  Patient did not state specific goal    Notes:  Patient was attentive at listening to group discussion but did not share any information    Status After Intervention:  Unchanged    Participation Level:  Active Listener    Participation Quality: Appropriate      Speech:  normal      Thought Process/Content: Logical      Affective Functioning: Congruent      Mood: euthymic      Level of consciousness:  Alert and Oriented x4      Response to Learning: Able to verbalize current knowledge/experience      Endings: None Reported    Modes of Intervention: Support      Discipline Responsible: /Counselor      Signature:  Tay Alford

## 2019-06-27 NOTE — GROUP NOTE
Group Therapy Note    Date: June 26    Group Start Time: 0800  Group End Time: 0830  Group Topic: Recreational    [facility] 3W ABRAHANI    Triston Zhao    Patient did not attend Activity Group, despite staff encouragement.         Signature:  Triston Zhao

## 2019-06-28 LAB — CULTURE, BLOOD 2: NORMAL

## 2023-03-07 LAB
BASOPHILS (10*3/UL) IN BLOOD BY AUTOMATED COUNT: 0.04 X10E9/L (ref 0–0.1)
BASOPHILS/100 LEUKOCYTES IN BLOOD BY AUTOMATED COUNT: 0.4 % (ref 0–2)
EOSINOPHILS (10*3/UL) IN BLOOD BY AUTOMATED COUNT: 0.37 X10E9/L (ref 0–0.7)
EOSINOPHILS/100 LEUKOCYTES IN BLOOD BY AUTOMATED COUNT: 4.1 % (ref 0–6)
ERYTHROCYTE DISTRIBUTION WIDTH (RATIO) BY AUTOMATED COUNT: 13 % (ref 11.5–14.5)
ERYTHROCYTE MEAN CORPUSCULAR HEMOGLOBIN CONCENTRATION (G/DL) BY AUTOMATED: 33.5 G/DL (ref 32–36)
ERYTHROCYTE MEAN CORPUSCULAR VOLUME (FL) BY AUTOMATED COUNT: 93 FL (ref 80–100)
ERYTHROCYTES (10*6/UL) IN BLOOD BY AUTOMATED COUNT: 4.32 X10E12/L (ref 4.5–5.9)
HEMATOCRIT (%) IN BLOOD BY AUTOMATED COUNT: 40.3 % (ref 41–52)
HEMOGLOBIN (G/DL) IN BLOOD: 13.5 G/DL (ref 13.5–17.5)
IMMATURE GRANULOCYTES/100 LEUKOCYTES IN BLOOD BY AUTOMATED COUNT: 0.2 % (ref 0–0.9)
LEUKOCYTES (10*3/UL) IN BLOOD BY AUTOMATED COUNT: 9.1 X10E9/L (ref 4.4–11.3)
LYMPHOCYTES (10*3/UL) IN BLOOD BY AUTOMATED COUNT: 2.74 X10E9/L (ref 1.2–4.8)
LYMPHOCYTES/100 LEUKOCYTES IN BLOOD BY AUTOMATED COUNT: 30.1 % (ref 13–44)
MONOCYTES (10*3/UL) IN BLOOD BY AUTOMATED COUNT: 0.56 X10E9/L (ref 0.1–1)
MONOCYTES/100 LEUKOCYTES IN BLOOD BY AUTOMATED COUNT: 6.2 % (ref 2–10)
NEUTROPHILS (10*3/UL) IN BLOOD BY AUTOMATED COUNT: 5.37 X10E9/L (ref 1.2–7.7)
NEUTROPHILS/100 LEUKOCYTES IN BLOOD BY AUTOMATED COUNT: 59 % (ref 40–80)
PLATELETS (10*3/UL) IN BLOOD AUTOMATED COUNT: 195 X10E9/L (ref 150–450)

## 2024-10-29 ENCOUNTER — HOSPITAL ENCOUNTER (EMERGENCY)
Facility: HOSPITAL | Age: 59
Discharge: HOME | End: 2024-10-30
Attending: EMERGENCY MEDICINE
Payer: COMMERCIAL

## 2024-10-29 ENCOUNTER — APPOINTMENT (OUTPATIENT)
Dept: RADIOLOGY | Facility: HOSPITAL | Age: 59
End: 2024-10-29
Payer: COMMERCIAL

## 2024-10-29 ENCOUNTER — CLINICAL SUPPORT (OUTPATIENT)
Dept: EMERGENCY MEDICINE | Facility: HOSPITAL | Age: 59
End: 2024-10-29
Payer: COMMERCIAL

## 2024-10-29 DIAGNOSIS — R41.82 ALTERED MENTAL STATUS, UNSPECIFIED ALTERED MENTAL STATUS TYPE: Primary | ICD-10-CM

## 2024-10-29 DIAGNOSIS — T50.901A ACCIDENTAL DRUG OVERDOSE, INITIAL ENCOUNTER: ICD-10-CM

## 2024-10-29 LAB
ALBUMIN SERPL BCP-MCNC: 4.3 G/DL (ref 3.4–5)
ALP SERPL-CCNC: 95 U/L (ref 33–136)
ALT SERPL W P-5'-P-CCNC: 18 U/L (ref 10–52)
AMMONIA PLAS-SCNC: 33 UMOL/L (ref 16–53)
AMPHETAMINES UR QL SCN: NORMAL
ANION GAP BLDV CALCULATED.4IONS-SCNC: 11 MMOL/L (ref 10–25)
ANION GAP BLDV CALCULATED.4IONS-SCNC: 8 MMOL/L (ref 10–25)
ANION GAP SERPL CALC-SCNC: 18 MMOL/L (ref 10–20)
APAP SERPL-MCNC: <10 UG/ML
APPEARANCE UR: CLEAR
AST SERPL W P-5'-P-CCNC: 15 U/L (ref 9–39)
BARBITURATES UR QL SCN: NORMAL
BASE EXCESS BLDV CALC-SCNC: 2.2 MMOL/L (ref -2–3)
BASE EXCESS BLDV CALC-SCNC: 2.2 MMOL/L (ref -2–3)
BASOPHILS # BLD AUTO: 0.02 X10*3/UL (ref 0–0.1)
BASOPHILS NFR BLD AUTO: 0.3 %
BENZODIAZ UR QL SCN: NORMAL
BILIRUB SERPL-MCNC: 0.3 MG/DL (ref 0–1.2)
BILIRUB UR STRIP.AUTO-MCNC: NEGATIVE MG/DL
BODY TEMPERATURE: 37 DEGREES CELSIUS
BODY TEMPERATURE: 37 DEGREES CELSIUS
BUN SERPL-MCNC: 14 MG/DL (ref 6–23)
BZE UR QL SCN: NORMAL
CA-I BLDV-SCNC: 1.14 MMOL/L (ref 1.1–1.33)
CA-I BLDV-SCNC: 1.17 MMOL/L (ref 1.1–1.33)
CALCIUM SERPL-MCNC: 9 MG/DL (ref 8.6–10.6)
CANNABINOIDS UR QL SCN: NORMAL
CHLORIDE BLDV-SCNC: 103 MMOL/L (ref 98–107)
CHLORIDE BLDV-SCNC: 99 MMOL/L (ref 98–107)
CHLORIDE SERPL-SCNC: 98 MMOL/L (ref 98–107)
CO2 SERPL-SCNC: 25 MMOL/L (ref 21–32)
COLOR UR: ABNORMAL
CREAT SERPL-MCNC: 0.86 MG/DL (ref 0.5–1.3)
EGFRCR SERPLBLD CKD-EPI 2021: >90 ML/MIN/1.73M*2
EOSINOPHIL # BLD AUTO: 0.32 X10*3/UL (ref 0–0.7)
EOSINOPHIL NFR BLD AUTO: 5.2 %
ERYTHROCYTE [DISTWIDTH] IN BLOOD BY AUTOMATED COUNT: 13.2 % (ref 11.5–14.5)
ETHANOL SERPL-MCNC: <10 MG/DL
FENTANYL+NORFENTANYL UR QL SCN: NORMAL
GLUCOSE BLD MANUAL STRIP-MCNC: 135 MG/DL (ref 74–99)
GLUCOSE BLDV-MCNC: 140 MG/DL (ref 74–99)
GLUCOSE BLDV-MCNC: 177 MG/DL (ref 74–99)
GLUCOSE SERPL-MCNC: 130 MG/DL (ref 74–99)
GLUCOSE UR STRIP.AUTO-MCNC: ABNORMAL MG/DL
HCO3 BLDV-SCNC: 27.8 MMOL/L (ref 22–26)
HCO3 BLDV-SCNC: 27.8 MMOL/L (ref 22–26)
HCT VFR BLD AUTO: 36.6 % (ref 41–52)
HCT VFR BLD EST: 38 % (ref 41–52)
HCT VFR BLD EST: 39 % (ref 41–52)
HGB BLD-MCNC: 12.8 G/DL (ref 13.5–17.5)
HGB BLDV-MCNC: 12.8 G/DL (ref 13.5–17.5)
HGB BLDV-MCNC: 13.1 G/DL (ref 13.5–17.5)
IMM GRANULOCYTES # BLD AUTO: 0.02 X10*3/UL (ref 0–0.7)
IMM GRANULOCYTES NFR BLD AUTO: 0.3 % (ref 0–0.9)
INHALED O2 CONCENTRATION: 21 %
KETONES UR STRIP.AUTO-MCNC: ABNORMAL MG/DL
LACTATE BLDV-SCNC: 2.3 MMOL/L (ref 0.4–2)
LACTATE BLDV-SCNC: 4 MMOL/L (ref 0.4–2)
LEUKOCYTE ESTERASE UR QL STRIP.AUTO: NEGATIVE
LYMPHOCYTES # BLD AUTO: 1.68 X10*3/UL (ref 1.2–4.8)
LYMPHOCYTES NFR BLD AUTO: 27.1 %
MAGNESIUM SERPL-MCNC: 2.05 MG/DL (ref 1.6–2.4)
MCH RBC QN AUTO: 31.2 PG (ref 26–34)
MCHC RBC AUTO-ENTMCNC: 35 G/DL (ref 32–36)
MCV RBC AUTO: 89 FL (ref 80–100)
METHADONE UR QL SCN: NORMAL
MONOCYTES # BLD AUTO: 0.66 X10*3/UL (ref 0.1–1)
MONOCYTES NFR BLD AUTO: 10.6 %
NEUTROPHILS # BLD AUTO: 3.5 X10*3/UL (ref 1.2–7.7)
NEUTROPHILS NFR BLD AUTO: 56.5 %
NITRITE UR QL STRIP.AUTO: NEGATIVE
NRBC BLD-RTO: 0 /100 WBCS (ref 0–0)
OPIATES UR QL SCN: NORMAL
OXYCODONE+OXYMORPHONE UR QL SCN: NORMAL
OXYHGB MFR BLDV: 75.9 % (ref 45–75)
OXYHGB MFR BLDV: 79.5 % (ref 45–75)
PCO2 BLDV: 46 MM HG (ref 41–51)
PCO2 BLDV: 46 MM HG (ref 41–51)
PCP UR QL SCN: NORMAL
PH BLDV: 7.39 PH (ref 7.33–7.43)
PH BLDV: 7.39 PH (ref 7.33–7.43)
PH UR STRIP.AUTO: 6 [PH]
PLATELET # BLD AUTO: 153 X10*3/UL (ref 150–450)
PO2 BLDV: 51 MM HG (ref 35–45)
PO2 BLDV: 52 MM HG (ref 35–45)
POTASSIUM BLDV-SCNC: 4.1 MMOL/L (ref 3.5–5.3)
POTASSIUM BLDV-SCNC: 4.5 MMOL/L (ref 3.5–5.3)
POTASSIUM SERPL-SCNC: 4.7 MMOL/L (ref 3.5–5.3)
PROT SERPL-MCNC: 6.8 G/DL (ref 6.4–8.2)
PROT UR STRIP.AUTO-MCNC: NEGATIVE MG/DL
RBC # BLD AUTO: 4.1 X10*6/UL (ref 4.5–5.9)
RBC # UR STRIP.AUTO: NEGATIVE /UL
SALICYLATES SERPL-MCNC: <3 MG/DL
SAO2 % BLDV: 78 % (ref 45–75)
SAO2 % BLDV: 82 % (ref 45–75)
SODIUM BLDV-SCNC: 133 MMOL/L (ref 136–145)
SODIUM BLDV-SCNC: 135 MMOL/L (ref 136–145)
SODIUM SERPL-SCNC: 136 MMOL/L (ref 136–145)
SP GR UR STRIP.AUTO: 1.01
UROBILINOGEN UR STRIP.AUTO-MCNC: NORMAL MG/DL
WBC # BLD AUTO: 6.2 X10*3/UL (ref 4.4–11.3)

## 2024-10-29 PROCEDURE — 80320 DRUG SCREEN QUANTALCOHOLS: CPT

## 2024-10-29 PROCEDURE — 93005 ELECTROCARDIOGRAM TRACING: CPT

## 2024-10-29 PROCEDURE — 83735 ASSAY OF MAGNESIUM: CPT

## 2024-10-29 PROCEDURE — 82947 ASSAY GLUCOSE BLOOD QUANT: CPT

## 2024-10-29 PROCEDURE — 94762 N-INVAS EAR/PLS OXIMTRY CONT: CPT | Mod: CCI

## 2024-10-29 PROCEDURE — 36415 COLL VENOUS BLD VENIPUNCTURE: CPT

## 2024-10-29 PROCEDURE — 80053 COMPREHEN METABOLIC PANEL: CPT

## 2024-10-29 PROCEDURE — 71045 X-RAY EXAM CHEST 1 VIEW: CPT

## 2024-10-29 PROCEDURE — 80307 DRUG TEST PRSMV CHEM ANLYZR: CPT

## 2024-10-29 PROCEDURE — 70450 CT HEAD/BRAIN W/O DYE: CPT | Performed by: RADIOLOGY

## 2024-10-29 PROCEDURE — 70450 CT HEAD/BRAIN W/O DYE: CPT

## 2024-10-29 PROCEDURE — 82435 ASSAY OF BLOOD CHLORIDE: CPT

## 2024-10-29 PROCEDURE — 81003 URINALYSIS AUTO W/O SCOPE: CPT | Mod: CCI

## 2024-10-29 PROCEDURE — 85025 COMPLETE CBC W/AUTO DIFF WBC: CPT

## 2024-10-29 PROCEDURE — 99285 EMERGENCY DEPT VISIT HI MDM: CPT | Mod: 25 | Performed by: EMERGENCY MEDICINE

## 2024-10-29 PROCEDURE — 83605 ASSAY OF LACTIC ACID: CPT

## 2024-10-29 PROCEDURE — 82140 ASSAY OF AMMONIA: CPT

## 2024-10-29 PROCEDURE — 71045 X-RAY EXAM CHEST 1 VIEW: CPT | Performed by: STUDENT IN AN ORGANIZED HEALTH CARE EDUCATION/TRAINING PROGRAM

## 2024-10-29 ASSESSMENT — PAIN - FUNCTIONAL ASSESSMENT: PAIN_FUNCTIONAL_ASSESSMENT: 0-10

## 2024-10-29 ASSESSMENT — PAIN SCALES - GENERAL
PAINLEVEL_OUTOF10: 0 - NO PAIN

## 2024-10-29 ASSESSMENT — COLUMBIA-SUICIDE SEVERITY RATING SCALE - C-SSRS
1. IN THE PAST MONTH, HAVE YOU WISHED YOU WERE DEAD OR WISHED YOU COULD GO TO SLEEP AND NOT WAKE UP?: NO
2. HAVE YOU ACTUALLY HAD ANY THOUGHTS OF KILLING YOURSELF?: NO
6. HAVE YOU EVER DONE ANYTHING, STARTED TO DO ANYTHING, OR PREPARED TO DO ANYTHING TO END YOUR LIFE?: NO

## 2024-10-30 VITALS
OXYGEN SATURATION: 98 % | SYSTOLIC BLOOD PRESSURE: 166 MMHG | HEART RATE: 88 BPM | WEIGHT: 220 LBS | TEMPERATURE: 97.7 F | DIASTOLIC BLOOD PRESSURE: 81 MMHG | RESPIRATION RATE: 18 BRPM | BODY MASS INDEX: 30.8 KG/M2 | HEIGHT: 71 IN

## 2024-10-30 LAB — HOLD SPECIMEN: NORMAL

## 2024-11-04 ENCOUNTER — HOSPITAL ENCOUNTER (OUTPATIENT)
Facility: HOSPITAL | Age: 59
Setting detail: OBSERVATION
Discharge: OTHER INSTITUTION | DRG: 885 | End: 2024-11-06
Attending: EMERGENCY MEDICINE | Admitting: EMERGENCY MEDICINE
Payer: COMMERCIAL

## 2024-11-04 ENCOUNTER — CLINICAL SUPPORT (OUTPATIENT)
Dept: EMERGENCY MEDICINE | Facility: HOSPITAL | Age: 59
DRG: 885 | End: 2024-11-04
Payer: COMMERCIAL

## 2024-11-04 DIAGNOSIS — F23: Primary | ICD-10-CM

## 2024-11-04 LAB
BASOPHILS # BLD AUTO: 0.05 X10*3/UL (ref 0–0.1)
BASOPHILS NFR BLD AUTO: 0.6 %
EOSINOPHIL # BLD AUTO: 0.17 X10*3/UL (ref 0–0.7)
EOSINOPHIL NFR BLD AUTO: 2.2 %
ERYTHROCYTE [DISTWIDTH] IN BLOOD BY AUTOMATED COUNT: 12.9 % (ref 11.5–14.5)
HCT VFR BLD AUTO: 39.3 % (ref 41–52)
HGB BLD-MCNC: 13.7 G/DL (ref 13.5–17.5)
IMM GRANULOCYTES # BLD AUTO: 0.02 X10*3/UL (ref 0–0.7)
IMM GRANULOCYTES NFR BLD AUTO: 0.3 % (ref 0–0.9)
LYMPHOCYTES # BLD AUTO: 2.21 X10*3/UL (ref 1.2–4.8)
LYMPHOCYTES NFR BLD AUTO: 28.4 %
MCH RBC QN AUTO: 30.9 PG (ref 26–34)
MCHC RBC AUTO-ENTMCNC: 34.9 G/DL (ref 32–36)
MCV RBC AUTO: 89 FL (ref 80–100)
MONOCYTES # BLD AUTO: 0.79 X10*3/UL (ref 0.1–1)
MONOCYTES NFR BLD AUTO: 10.2 %
NEUTROPHILS # BLD AUTO: 4.54 X10*3/UL (ref 1.2–7.7)
NEUTROPHILS NFR BLD AUTO: 58.3 %
NRBC BLD-RTO: 0 /100 WBCS (ref 0–0)
PLATELET # BLD AUTO: 199 X10*3/UL (ref 150–450)
RBC # BLD AUTO: 4.44 X10*6/UL (ref 4.5–5.9)
WBC # BLD AUTO: 7.8 X10*3/UL (ref 4.4–11.3)

## 2024-11-04 PROCEDURE — 93005 ELECTROCARDIOGRAM TRACING: CPT

## 2024-11-04 PROCEDURE — 80053 COMPREHEN METABOLIC PANEL: CPT

## 2024-11-04 PROCEDURE — 99285 EMERGENCY DEPT VISIT HI MDM: CPT | Performed by: EMERGENCY MEDICINE

## 2024-11-04 PROCEDURE — 80320 DRUG SCREEN QUANTALCOHOLS: CPT

## 2024-11-04 PROCEDURE — 84075 ASSAY ALKALINE PHOSPHATASE: CPT | Performed by: EMERGENCY MEDICINE

## 2024-11-04 PROCEDURE — 99285 EMERGENCY DEPT VISIT HI MDM: CPT

## 2024-11-04 PROCEDURE — 36415 COLL VENOUS BLD VENIPUNCTURE: CPT

## 2024-11-04 PROCEDURE — 85025 COMPLETE CBC W/AUTO DIFF WBC: CPT

## 2024-11-05 VITALS
HEART RATE: 89 BPM | OXYGEN SATURATION: 97 % | RESPIRATION RATE: 16 BRPM | TEMPERATURE: 99 F | DIASTOLIC BLOOD PRESSURE: 74 MMHG | WEIGHT: 220.02 LBS | BODY MASS INDEX: 33.35 KG/M2 | SYSTOLIC BLOOD PRESSURE: 155 MMHG | HEIGHT: 68 IN

## 2024-11-05 PROBLEM — F29 PSYCHOSIS, UNSPECIFIED PSYCHOSIS TYPE (MULTI): Status: ACTIVE | Noted: 2024-11-05

## 2024-11-05 LAB
ALBUMIN SERPL BCP-MCNC: 4.5 G/DL (ref 3.4–5)
ALP SERPL-CCNC: 88 U/L (ref 33–136)
ALT SERPL W P-5'-P-CCNC: 26 U/L (ref 10–52)
AMPHETAMINES UR QL SCN: NORMAL
ANION GAP SERPL CALC-SCNC: 17 MMOL/L (ref 10–20)
APAP SERPL-MCNC: <10 UG/ML
AST SERPL W P-5'-P-CCNC: 19 U/L (ref 9–39)
ATRIAL RATE: 101 BPM
BARBITURATES UR QL SCN: NORMAL
BENZODIAZ UR QL SCN: NORMAL
BILIRUB SERPL-MCNC: 0.3 MG/DL (ref 0–1.2)
BUN SERPL-MCNC: 13 MG/DL (ref 6–23)
BZE UR QL SCN: NORMAL
CALCIUM SERPL-MCNC: 9.5 MG/DL (ref 8.6–10.6)
CANNABINOIDS UR QL SCN: NORMAL
CHLORIDE SERPL-SCNC: 102 MMOL/L (ref 98–107)
CO2 SERPL-SCNC: 28 MMOL/L (ref 21–32)
CREAT SERPL-MCNC: 0.9 MG/DL (ref 0.5–1.3)
EGFRCR SERPLBLD CKD-EPI 2021: >90 ML/MIN/1.73M*2
ETHANOL SERPL-MCNC: <10 MG/DL
FENTANYL+NORFENTANYL UR QL SCN: NORMAL
GLUCOSE SERPL-MCNC: 127 MG/DL (ref 74–99)
METHADONE UR QL SCN: NORMAL
OPIATES UR QL SCN: NORMAL
OXYCODONE+OXYMORPHONE UR QL SCN: NORMAL
P AXIS: 57 DEGREES
P OFFSET: 203 MS
P ONSET: 161 MS
PCP UR QL SCN: NORMAL
POTASSIUM SERPL-SCNC: 3.8 MMOL/L (ref 3.5–5.3)
PR INTERVAL: 122 MS
PROT SERPL-MCNC: 7.2 G/DL (ref 6.4–8.2)
Q ONSET: 222 MS
QRS COUNT: 16 BEATS
QRS DURATION: 82 MS
QT INTERVAL: 336 MS
QTC CALCULATION(BAZETT): 435 MS
QTC FREDERICIA: 399 MS
R AXIS: 52 DEGREES
SALICYLATES SERPL-MCNC: <3 MG/DL
SODIUM SERPL-SCNC: 143 MMOL/L (ref 136–145)
T AXIS: 31 DEGREES
T OFFSET: 390 MS
VENTRICULAR RATE: 101 BPM

## 2024-11-05 PROCEDURE — 80307 DRUG TEST PRSMV CHEM ANLYZR: CPT

## 2024-11-05 PROCEDURE — 93010 ELECTROCARDIOGRAM REPORT: CPT

## 2024-11-05 PROCEDURE — G0378 HOSPITAL OBSERVATION PER HR: HCPCS

## 2024-11-05 RX ORDER — CARBAMAZEPINE 400 MG/1
400 TABLET, EXTENDED RELEASE ORAL NIGHTLY
COMMUNITY
Start: 2024-10-22

## 2024-11-05 RX ORDER — FERROUS SULFATE 325(65) MG
325 TABLET ORAL
COMMUNITY

## 2024-11-05 RX ORDER — ASPIRIN 81 MG/1
81 TABLET ORAL DAILY
COMMUNITY

## 2024-11-05 RX ORDER — LISINOPRIL 20 MG/1
20 TABLET ORAL DAILY
COMMUNITY
Start: 2024-11-04

## 2024-11-05 RX ORDER — INSULIN LISPRO 200 [IU]/ML
18 INJECTION, SOLUTION SUBCUTANEOUS 3 TIMES DAILY
COMMUNITY
Start: 2024-11-04 | End: 2024-11-26 | Stop reason: HOSPADM

## 2024-11-05 RX ORDER — TALC
3 POWDER (GRAM) TOPICAL NIGHTLY
Status: ON HOLD | COMMUNITY
End: 2024-11-17

## 2024-11-05 RX ORDER — CLOZAPINE 100 MG/1
400 TABLET ORAL DAILY
Status: DISCONTINUED | OUTPATIENT
Start: 2024-11-05 | End: 2024-11-06 | Stop reason: HOSPADM

## 2024-11-05 RX ORDER — METFORMIN HYDROCHLORIDE 1000 MG/1
1000 TABLET ORAL
COMMUNITY
Start: 2020-11-09

## 2024-11-05 RX ORDER — LANOLIN ALCOHOL/MO/W.PET/CERES
100 CREAM (GRAM) TOPICAL DAILY
COMMUNITY

## 2024-11-05 RX ORDER — CLOZAPINE 100 MG/1
100 TABLET ORAL NIGHTLY
COMMUNITY
Start: 2024-10-31 | End: 2024-11-26 | Stop reason: HOSPADM

## 2024-11-05 RX ORDER — POLYETHYLENE GLYCOL 3350 17 G/17G
17 POWDER, FOR SOLUTION ORAL DAILY
Status: DISCONTINUED | OUTPATIENT
Start: 2024-11-05 | End: 2024-11-06 | Stop reason: HOSPADM

## 2024-11-05 RX ORDER — DOCUSATE SODIUM 100 MG/1
100 CAPSULE, LIQUID FILLED ORAL 2 TIMES DAILY
Status: DISCONTINUED | OUTPATIENT
Start: 2024-11-05 | End: 2024-11-06 | Stop reason: HOSPADM

## 2024-11-05 RX ORDER — INFLUENZA A VIRUS A/VICTORIA/4897/2022 IVR-238 (H1N1) ANTIGEN (FORMALDEHYDE INACTIVATED), INFLUENZA A VIRUS A/CALIFORNIA/122/2022 SAN-022 (H3N2) ANTIGEN (FORMALDEHYDE INACTIVATED), AND INFLUENZA B VIRUS B/MICHIGAN/01/2021 ANTIGEN (FORMALDEHYDE INACTIVATED) 60; 60; 60 UG/.5ML; UG/.5ML; UG/.5ML
0.5 INJECTION, SUSPENSION INTRAMUSCULAR ONCE
COMMUNITY
Start: 2024-10-30 | End: 2024-11-06 | Stop reason: ALTCHOICE

## 2024-11-05 RX ORDER — BENZTROPINE MESYLATE 0.5 MG/1
0.5 TABLET ORAL 2 TIMES DAILY
COMMUNITY
Start: 2024-10-17

## 2024-11-05 RX ORDER — INSULIN GLARGINE 100 [IU]/ML
70 INJECTION, SOLUTION SUBCUTANEOUS NIGHTLY
COMMUNITY
Start: 2024-10-24 | End: 2024-11-26 | Stop reason: HOSPADM

## 2024-11-05 RX ORDER — PROPRANOLOL HYDROCHLORIDE 10 MG/1
10 TABLET ORAL 3 TIMES DAILY
COMMUNITY
Start: 2024-10-29

## 2024-11-05 RX ORDER — CLOZAPINE 200 MG/1
400 TABLET ORAL 2 TIMES DAILY
COMMUNITY
Start: 2024-10-31 | End: 2024-11-26 | Stop reason: HOSPADM

## 2024-11-05 RX ORDER — HALOPERIDOL DECANOATE 100 MG/ML
200 INJECTION INTRAMUSCULAR
COMMUNITY
Start: 2024-10-26 | End: 2024-11-26 | Stop reason: HOSPADM

## 2024-11-05 RX ORDER — ATORVASTATIN CALCIUM 40 MG/1
40 TABLET, FILM COATED ORAL NIGHTLY
COMMUNITY
Start: 2024-10-25

## 2024-11-05 SDOH — HEALTH STABILITY: MENTAL HEALTH: BEHAVIORAL HEALTH(WDL): EXCEPTIONS TO WDL

## 2024-11-05 SDOH — HEALTH STABILITY: MENTAL HEALTH: BEHAVIORS/MOOD: NONVERBAL;COOPERATIVE;GUARDED

## 2024-11-05 ASSESSMENT — LIFESTYLE VARIABLES
HAVE PEOPLE ANNOYED YOU BY CRITICIZING YOUR DRINKING: NO
HAVE YOU EVER FELT YOU SHOULD CUT DOWN ON YOUR DRINKING: NO
EVER HAD A DRINK FIRST THING IN THE MORNING TO STEADY YOUR NERVES TO GET RID OF A HANGOVER: NO
EVER FELT BAD OR GUILTY ABOUT YOUR DRINKING: NO
TOTAL SCORE: 0

## 2024-11-05 NOTE — PROGRESS NOTES
EPAT - Social Work Psychiatric Assessment    Information:  Name of : Taty Ly [facility]   EPAT Assessment Start Date/Time: 05-November 04:30     Arrival Details:  Means of Arrival: Carried   Mode of Arrival: EMS / Fire Dept   Arrived from: Nursing Home   Legal Status on Admission: involuntary     History of Present Illness:  HPI:    59 yr old AA male with long history of Schizoaffective Disorder presenting to the ED after becoming combative his nursing home in the context of non-compliance with medications. He has been refusing to take his oral psychiatric medications.  Provider Note and chart history is reviewed.  Patient is a long term resident at [facility].  His mother is his guardian.  He has numerous prior psychiatric admissions.  He is seeing psychiatry at [facility].  In assessment he is responding to internal stimuli and does not respond meaningfully to interview questions.  He evidences significant tremor.  The patient is irritable and will verbally decline procedures and assistance but then does allow us to get labs and vitals from him.   He has not required restraints or medications thus far in the Emergency Department.  He is not reporting thoughts of harming himself or others.     ADULT Psychiatric Review of Symptoms:  Anxiety: INGE   General Anxiety Disorder: restlessness   Depression: negative   Delirium: negative   Psychosis: responding to internal stimuli and unable to participate meaningfully    Bhavya:  sleep disturbance, irritability        Past Psychiatric History:  Past Psychiatric History:    Admitting Psychiatric Diagnosis: Schizoaffective Disorder  Current Mental Health Center: Placed at [facility], sees [facility] for psych  Previous Psychiatric Inpatient Hospitalizations (Location and Dates). 10/2020 at [facility], 9/2020 at  [facility], [facility]...  Previous Substance Abuse Treatment (Location and Dates): denies   Family History  No of Psychiatric Treatment: unknown   History of Self-Harming Behaviors: denies   History of Trauma: will not discuss     Past Psychiatric Meds/Treatments/ECT: Current Psychiatric Medications: See enclosed Medication List   Is patient compliant with medications: No  Previous Psychiatric Medications: Thorazine (allergies to Depakote, Lithium, and NSAIDS  Are there any medications which patient does not feel were effective:, unknown     Violence/Victimization:  History of Violence and Victimization: some history of combativeness towards caregivers     Current Mental Health Contacts:   Name/Phone Number: n/a    Last Appointment Date: n/a   Provider Name/Phone Number: [facility]   Provider Last Appointment Date: unknown       Social Information:  Insurance: Concuity Dual    Pharmacy Information: see RN Home information   Marital Status: single   Sexual Orientation: heterosexual   Primary Source of Support/Comfort: mother, sibling(s); nonrelative caregiver   Lives With: RN Home   Living Arrangements: nursing home   Ability to Care for Self: yes (gait impaired with history of falls)   Able to Return to Prior Living Arrangements Following Hospitalization: yes   Feels Safe Living in Home: yes   Potentially Unsafe Housing Conditions: denies   Employment Status: disabled   Source of Income: SSDI   Current or Previous  Service: none    Service Experience: denies   Education: high school   History of Learning Problems: no   History of School Behavior Problems: no   Are there any cultural, spiritual, Alevism practices/values/needs that are important for us to know?: no   Important Activities: family; social       Smoking Status: former smoker    Alcohol Use: denies   Drug Use: denies   Drug 2 Use: denies   Occupation: disabled   Social History:    [place] Citizen: yes   Payee: n/a  Guardian/POA: Mother: Mehnaz Roach [phone]  Current Stressors: chronic mental illness,  "nursing home resident       School History:    High School Diploma     Legal History:    denies       Mental Status Exam:   General: AA male with mostly gray curly hair, 5'8\" 220lbs, fair grooming, poor hygiene laying in bed resting on his side, slight tremor in hand noted   Appearance: appears considerably older   Attitude: Calm, verbally refuses but does allow for labs and procedures without agitation   Behavior: Little eye contact    Motor Activity: Unremarkable but does evidence tremor    Speech: impoverished    Mood: guarded, anxious   Affect: blunted    Thought Process: unable to assess   Thought Content: Does not endorse suicidal or homicidal ideation, no delusions elicited. Impoverished in general    Thought Perception: Internally preoccupied and unable to participate    Cognition: Unable to assess   Insight: impaired    Judgment: Impaired      Risk of Harm to Others:  Current Risk of Harm to Others: no      Psychiatric Risk Assessment:  Psychiatric Risk Assessment: adult    Violence Risk Assessment: 1st psychiatric hospitalization by age 16, major mental illness, male   Acute Risk of Harm to Others is Considered: low      Purlear Suicide: STEP 1  · Risk Screen Not Applicable/Able to Answer Unable to be screened    · In the Past Month: Have you wished you were dead or could go to sleep and not wake up? no   · In the Past Month: Have you had any actual thoughts of killing yourself? no   · Lifetime: Have you ever done, started to do, or prepared to do anything to end your life? no   · Purlear Suicide Risk negative      Purlear Risk Factors:  · Current and Past Psychiatric Dx Mood disorder; Psychotic disorder   · Presenting Symptoms psychosis   · Family History denies   · Precipitants/Stressors denies   · Change in Treatment N/A   · Access to Lethal Methods (e.g. firearms in the home) no      Purlear STEP 2: Identify Protective Factors  · Protective Factors Internal fear of death or the actual act of " "killing self;   · Protective Factors External supportive social network of family or friends; positive therapeutic relationships     Randolph STEP 3: Suicidal Ideation Intensity  · In the Past Month: How Many Times Have You Had These Thoughts (1) less than once a week    · In the Past Month: When You Have the Thoughts How Long Do They Last (1) fleeting - few seconds or minutes    · In the Past Month: Could/Can You Stop Thinking About Killing Yourself or Wanting to Die If You Want To (0) does not attempt to control thoughts    · In the Past Month: Are There Things - Anyone or Anything (e.g. family, Amish, pain of death) - That Stopped You From Wanting To Die or Acting On Thoughts of Suicide (0) does not apply    · In the Past Month: What Sort of Reasons Did You Have for Thinking About Wanting to Die or Killing Yourself? Was It To End Pain or Stop the Way You Were Feeling? or Was It To Get Attention, Revenge, Reaction From Others? Or Both (0) does not apply    · Randolph Severity Total Score 2      Randolph STEP 4: Risk Based on Clinical Judgement  · Randolph Suicide Final Risk Level low    · Comment Risk Rating \"low\" reviewed with the Attending, Dr. Méndez       Assessment / Plan:  Assessment: 59 year old single Black male presenting to the Emergency Department at [facility] decompensated and acutely psychotic in the context of refusing his medications.  He is a resident of [facility].  Nursing Home staff reports that he has been refusing medications over the last 5-6 days.  He had been seen here 10/29 after taking extra Haldol and becoming altered.  He was not admitted at that time.  He was apparently combative with the staff at [facility].  The patient has a long history of Schizoaffective Disorder.  He is known to the interviewer and has a history of long periods of stability when taking his medications.  He has been in nursing home placement for several years now secondary to chronic " non-compliance in community.  In assessment today he is completely internally preoccupied and unable to participate in interview.  He has not reported thoughts of harming himself or others.  The patient will refuse just about everything but has allowed for labs and vitals without becoming aggressive.  Currently he is unable to function due to his mental status and will need psychiatric admission for stabilization.         Diagnostic Impression: Schizoaffective Disorder     Psychiatric Impression and Plan for Care: Inpatient admission     Agitation Assessment:   Is patient presenting as agitated? no  (If yes, please explain agitation)  Did the patient require restraints? n/a  If so, when were restraints removed? Time? n/a  Is patient able to respond well to verbal redirection? yes  If patient resides at nursing home, is nursing home in agreement: n/a   Nursing Home name and number for follow up: see packet       Psychiatric Impression and Plan of Care:  Specific Resources Provided to Patient (specify): n/a   CM Notified: n/a   PHP/IOP Recommended: n/a   Specific Information Provided for PHP/IOP: none     Assessment and Recommendation Outcome:  Patient's Perception of Outcome Achieved: unable to assess   Reviewed Assessment and Recommendations with: Dr. Méndez   Contact Name: Mehnaz Parra   Contact Number 1: [phone]   Relationship: mother (guardian)     EPAT Date/Time Details:  EPAT Assessment Complete Date/Time: 05-Nov-2024 2824     Patient Disposition:  Disposition Location: Inpatient psychiatric admission

## 2024-11-05 NOTE — ED PROVIDER NOTES
HPI   Chief Complaint   Patient presents with    Aggressive Behavior       HPI  Patient is a 59-year-old history of schizophrenia, diabetes presenting due to refusal to take his psych meds.  Patient is coming from a nursing facility.  According to the nursing facility, patient has refused to take his medication in the past 3 days.  Patient progressively is getting aggressive and agitated.  Patient was ANO x 1 and was not very cooperative.  Patient history is limited.  Patient came with a medical file.  In the medical file patient is supposed to be taking carbamazepine, clozapine and Depakote.      Patient History   History reviewed. No pertinent past medical history.  History reviewed. No pertinent surgical history.  No family history on file.  Social History     Tobacco Use    Smoking status: Every Day     Current packs/day: 0.50     Types: Cigarettes    Smokeless tobacco: Never   Substance Use Topics    Alcohol use: Not Currently    Drug use: Never       Physical Exam   ED Triage Vitals [11/04/24 2246]   Temperature Heart Rate Respirations BP   36.4 °C (97.5 °F) (!) 104 18 (!) 169/94      Pulse Ox Temp src Heart Rate Source Patient Position   97 % -- -- --      BP Location FiO2 (%)     -- --       Physical Exam  Constitutional:       Appearance: Normal appearance.   Cardiovascular:      Rate and Rhythm: Normal rate and regular rhythm.   Pulmonary:      Effort: Pulmonary effort is normal.      Breath sounds: Normal breath sounds.   Skin:     General: Skin is warm.   Neurological:      General: No focal deficit present.      Mental Status: He is alert.   Psychiatric:      Comments: Paranoia.  Hallucination.           ED Course & MDM   Diagnoses as of 11/05/24 2043   Schizophrenia, acute (Multi)                 No data recorded     Ludlow Coma Scale Score: 14 (11/05/24 0339 : Myra Uriostegui RN)                           Medical Decision Making  Patient is a 59-year-old history of schizophrenia, diabetes  presenting due to refusal to take his psych meds.  Patient is coming from a nursing facility.  According to the nursing facility, patient has refused to take his medication in the past 3 days.  Patient progressively is getting aggressive and agitated.  Patient was ANO x 1 and was not very cooperative.  Patient history is limited.  Patient came with a medical file.  In the medical file patient is supposed to be taking carbamazepine, clozapine and Depakote.  At bedside patient is hemodynamically stable and paranoia.  Patient most likely has decompensated schizophrenia.  EPAT was consulted.  Patient CMP and CBC has been unremarkable.  During my time of signout, patient was still pending EPAT recommendation.  The oncoming physician will follow-up with EPAT recommendation.    Procedure  Procedures     Michael Menon MD  Resident  11/05/24 2788

## 2024-11-05 NOTE — ED TRIAGE NOTES
Patient presented to the ED with increased aggressive behavior due to refusing to take his psych medications.

## 2024-11-06 ENCOUNTER — APPOINTMENT (OUTPATIENT)
Dept: RADIOLOGY | Facility: HOSPITAL | Age: 59
DRG: 885 | End: 2024-11-06
Payer: COMMERCIAL

## 2024-11-06 ENCOUNTER — HOSPITAL ENCOUNTER (INPATIENT)
Facility: HOSPITAL | Age: 59
End: 2024-11-06
Attending: EMERGENCY MEDICINE | Admitting: EMERGENCY MEDICINE
Payer: COMMERCIAL

## 2024-11-06 ENCOUNTER — CLINICAL SUPPORT (OUTPATIENT)
Dept: EMERGENCY MEDICINE | Facility: HOSPITAL | Age: 59
DRG: 885 | End: 2024-11-06
Payer: COMMERCIAL

## 2024-11-06 ENCOUNTER — APPOINTMENT (OUTPATIENT)
Dept: NEUROLOGY | Facility: HOSPITAL | Age: 59
DRG: 885 | End: 2024-11-06
Payer: COMMERCIAL

## 2024-11-06 DIAGNOSIS — R41.82 ALTERED MENTAL STATUS, UNSPECIFIED ALTERED MENTAL STATUS TYPE: Primary | ICD-10-CM

## 2024-11-06 PROBLEM — E78.5 HYPERLIPIDEMIA: Status: ACTIVE | Noted: 2024-11-06

## 2024-11-06 PROBLEM — I47.9 PAROXYSMAL TACHYCARDIA: Status: ACTIVE | Noted: 2024-11-06

## 2024-11-06 PROBLEM — E11.9 TYPE 2 DIABETES MELLITUS WITHOUT COMPLICATION (MULTI): Status: ACTIVE | Noted: 2024-11-06

## 2024-11-06 PROBLEM — I36.9 NONRHEUMATIC TRICUSPID VALVE DISORDER: Status: ACTIVE | Noted: 2024-11-06

## 2024-11-06 PROBLEM — F31.9 BIPOLAR DISORDER: Status: ACTIVE | Noted: 2020-11-10

## 2024-11-06 PROBLEM — F29 UNSPECIFIED PSYCHOSIS NOT DUE TO A SUBSTANCE OR KNOWN PHYSIOLOGICAL CONDITION (MULTI): Status: ACTIVE | Noted: 2020-11-10

## 2024-11-06 PROBLEM — I10 ESSENTIAL HYPERTENSION: Status: ACTIVE | Noted: 2020-11-10

## 2024-11-06 PROBLEM — I11.9 HYPERTENSIVE HEART DISEASE WITHOUT CONGESTIVE HEART FAILURE: Status: ACTIVE | Noted: 2024-11-06

## 2024-11-06 PROBLEM — F20.9 SCHIZOPHRENIA: Status: ACTIVE | Noted: 2020-11-10

## 2024-11-06 PROBLEM — D64.9 ANEMIA, UNSPECIFIED: Status: ACTIVE | Noted: 2020-11-10

## 2024-11-06 PROBLEM — F29 PSYCHOSIS (MULTI): Status: ACTIVE | Noted: 2024-11-06

## 2024-11-06 PROBLEM — F25.0 SCHIZOAFFECTIVE DISORDER, BIPOLAR TYPE (MULTI): Status: ACTIVE | Noted: 2020-11-10

## 2024-11-06 LAB
25(OH)D3 SERPL-MCNC: 29 NG/ML (ref 30–100)
ALBUMIN SERPL BCP-MCNC: 4.6 G/DL (ref 3.4–5)
ALBUMIN SERPL BCP-MCNC: 4.6 G/DL (ref 3.4–5)
ALP SERPL-CCNC: 94 U/L (ref 33–136)
ALT SERPL W P-5'-P-CCNC: 23 U/L (ref 10–52)
AMPHETAMINES UR QL SCN: NORMAL
ANION GAP SERPL CALC-SCNC: 23 MMOL/L (ref 10–20)
APAP SERPL-MCNC: <10 UG/ML
AST SERPL W P-5'-P-CCNC: 21 U/L (ref 9–39)
BARBITURATES UR QL SCN: NORMAL
BASOPHILS # BLD AUTO: 0.05 X10*3/UL (ref 0–0.1)
BASOPHILS NFR BLD AUTO: 0.5 %
BENZODIAZ UR QL SCN: NORMAL
BILIRUB DIRECT SERPL-MCNC: 0 MG/DL (ref 0–0.3)
BILIRUB SERPL-MCNC: 0.3 MG/DL (ref 0–1.2)
BUN SERPL-MCNC: 17 MG/DL (ref 6–23)
BZE UR QL SCN: NORMAL
CALCIUM SERPL-MCNC: 8.9 MG/DL (ref 8.6–10.6)
CANNABINOIDS UR QL SCN: NORMAL
CARDIAC TROPONIN I PNL SERPL HS: 4 NG/L (ref 0–53)
CHLORIDE SERPL-SCNC: 101 MMOL/L (ref 98–107)
CK SERPL-CCNC: 259 U/L (ref 0–325)
CO2 SERPL-SCNC: 21 MMOL/L (ref 21–32)
CREAT SERPL-MCNC: 1.07 MG/DL (ref 0.5–1.3)
EGFRCR SERPLBLD CKD-EPI 2021: 79 ML/MIN/1.73M*2
EOSINOPHIL # BLD AUTO: 0.16 X10*3/UL (ref 0–0.7)
EOSINOPHIL NFR BLD AUTO: 1.6 %
ERYTHROCYTE [DISTWIDTH] IN BLOOD BY AUTOMATED COUNT: 13.2 % (ref 11.5–14.5)
ETHANOL SERPL-MCNC: <10 MG/DL
FENTANYL+NORFENTANYL UR QL SCN: NORMAL
FLUAV RNA RESP QL NAA+PROBE: NOT DETECTED
FLUBV RNA RESP QL NAA+PROBE: NOT DETECTED
GLUCOSE BLD MANUAL STRIP-MCNC: 213 MG/DL (ref 74–99)
GLUCOSE BLD MANUAL STRIP-MCNC: 234 MG/DL (ref 74–99)
GLUCOSE BLD MANUAL STRIP-MCNC: 238 MG/DL (ref 74–99)
GLUCOSE BLD MANUAL STRIP-MCNC: 243 MG/DL (ref 74–99)
GLUCOSE BLD MANUAL STRIP-MCNC: 262 MG/DL (ref 74–99)
GLUCOSE SERPL-MCNC: 240 MG/DL (ref 74–99)
HCT VFR BLD AUTO: 43.2 % (ref 41–52)
HGB BLD-MCNC: 14.6 G/DL (ref 13.5–17.5)
HOLD SPECIMEN: NORMAL
IMM GRANULOCYTES # BLD AUTO: 0.04 X10*3/UL (ref 0–0.7)
IMM GRANULOCYTES NFR BLD AUTO: 0.4 % (ref 0–0.9)
LYMPHOCYTES # BLD AUTO: 1.92 X10*3/UL (ref 1.2–4.8)
LYMPHOCYTES NFR BLD AUTO: 18.8 %
MAGNESIUM SERPL-MCNC: 2.02 MG/DL (ref 1.6–2.4)
MCH RBC QN AUTO: 30.9 PG (ref 26–34)
MCHC RBC AUTO-ENTMCNC: 33.8 G/DL (ref 32–36)
MCV RBC AUTO: 91 FL (ref 80–100)
METHADONE UR QL SCN: NORMAL
MONOCYTES # BLD AUTO: 1.02 X10*3/UL (ref 0.1–1)
MONOCYTES NFR BLD AUTO: 10 %
NEUTROPHILS # BLD AUTO: 7.01 X10*3/UL (ref 1.2–7.7)
NEUTROPHILS NFR BLD AUTO: 68.7 %
NRBC BLD-RTO: 0 /100 WBCS (ref 0–0)
OPIATES UR QL SCN: NORMAL
OXYCODONE+OXYMORPHONE UR QL SCN: NORMAL
PCP UR QL SCN: NORMAL
PHOSPHATE SERPL-MCNC: 3.7 MG/DL (ref 2.5–4.9)
PLATELET # BLD AUTO: 217 X10*3/UL (ref 150–450)
POTASSIUM SERPL-SCNC: 4.4 MMOL/L (ref 3.5–5.3)
PROT SERPL-MCNC: 7.2 G/DL (ref 6.4–8.2)
RBC # BLD AUTO: 4.73 X10*6/UL (ref 4.5–5.9)
SALICYLATES SERPL-MCNC: <3 MG/DL
SARS-COV-2 RNA RESP QL NAA+PROBE: NOT DETECTED
SODIUM SERPL-SCNC: 141 MMOL/L (ref 136–145)
TSH SERPL-ACNC: 2.02 MIU/L (ref 0.44–3.98)
VIT B12 SERPL-MCNC: 359 PG/ML (ref 211–911)
WBC # BLD AUTO: 10.2 X10*3/UL (ref 4.4–11.3)

## 2024-11-06 PROCEDURE — 99291 CRITICAL CARE FIRST HOUR: CPT | Performed by: EMERGENCY MEDICINE

## 2024-11-06 PROCEDURE — 95816 EEG AWAKE AND DROWSY: CPT | Performed by: PSYCHIATRY & NEUROLOGY

## 2024-11-06 PROCEDURE — 82306 VITAMIN D 25 HYDROXY: CPT | Performed by: NURSE PRACTITIONER

## 2024-11-06 PROCEDURE — 2500000004 HC RX 250 GENERAL PHARMACY W/ HCPCS (ALT 636 FOR OP/ED): Mod: SE

## 2024-11-06 PROCEDURE — 84443 ASSAY THYROID STIM HORMONE: CPT | Performed by: EMERGENCY MEDICINE

## 2024-11-06 PROCEDURE — 96365 THER/PROPH/DIAG IV INF INIT: CPT | Mod: 59

## 2024-11-06 PROCEDURE — 99285 EMERGENCY DEPT VISIT HI MDM: CPT | Mod: 25

## 2024-11-06 PROCEDURE — 2500000001 HC RX 250 WO HCPCS SELF ADMINISTERED DRUGS (ALT 637 FOR MEDICARE OP): Performed by: NURSE PRACTITIONER

## 2024-11-06 PROCEDURE — 83735 ASSAY OF MAGNESIUM: CPT

## 2024-11-06 PROCEDURE — 82607 VITAMIN B-12: CPT | Performed by: NURSE PRACTITIONER

## 2024-11-06 PROCEDURE — 85025 COMPLETE CBC W/AUTO DIFF WBC: CPT

## 2024-11-06 PROCEDURE — 62270 DX LMBR SPI PNXR: CPT | Performed by: PHYSICIAN ASSISTANT

## 2024-11-06 PROCEDURE — 2500000004 HC RX 250 GENERAL PHARMACY W/ HCPCS (ALT 636 FOR OP/ED): Performed by: PHYSICIAN ASSISTANT

## 2024-11-06 PROCEDURE — 82746 ASSAY OF FOLIC ACID SERUM: CPT | Performed by: STUDENT IN AN ORGANIZED HEALTH CARE EDUCATION/TRAINING PROGRAM

## 2024-11-06 PROCEDURE — 96375 TX/PRO/DX INJ NEW DRUG ADDON: CPT | Mod: 59

## 2024-11-06 PROCEDURE — 99222 1ST HOSP IP/OBS MODERATE 55: CPT

## 2024-11-06 PROCEDURE — 71045 X-RAY EXAM CHEST 1 VIEW: CPT

## 2024-11-06 PROCEDURE — 80069 RENAL FUNCTION PANEL: CPT

## 2024-11-06 PROCEDURE — 96368 THER/DIAG CONCURRENT INF: CPT | Mod: 59

## 2024-11-06 PROCEDURE — 82550 ASSAY OF CK (CPK): CPT | Performed by: NURSE PRACTITIONER

## 2024-11-06 PROCEDURE — 82947 ASSAY GLUCOSE BLOOD QUANT: CPT

## 2024-11-06 PROCEDURE — 76942 ECHO GUIDE FOR BIOPSY: CPT | Performed by: PHYSICIAN ASSISTANT

## 2024-11-06 PROCEDURE — 80307 DRUG TEST PRSMV CHEM ANLYZR: CPT | Performed by: NURSE PRACTITIONER

## 2024-11-06 PROCEDURE — 96361 HYDRATE IV INFUSION ADD-ON: CPT | Mod: 59

## 2024-11-06 PROCEDURE — 2500000004 HC RX 250 GENERAL PHARMACY W/ HCPCS (ALT 636 FOR OP/ED): Performed by: NURSE PRACTITIONER

## 2024-11-06 PROCEDURE — 70450 CT HEAD/BRAIN W/O DYE: CPT | Performed by: RADIOLOGY

## 2024-11-06 PROCEDURE — 4A0004Z MEASUREMENT OF CENTRAL NERVOUS ELECTRICAL ACTIVITY, OPEN APPROACH: ICD-10-PCS | Performed by: INTERNAL MEDICINE

## 2024-11-06 PROCEDURE — 71260 CT THORAX DX C+: CPT | Performed by: RADIOLOGY

## 2024-11-06 PROCEDURE — 87899 AGENT NOS ASSAY W/OPTIC: CPT | Performed by: EMERGENCY MEDICINE

## 2024-11-06 PROCEDURE — 70450 CT HEAD/BRAIN W/O DYE: CPT

## 2024-11-06 PROCEDURE — 99223 1ST HOSP IP/OBS HIGH 75: CPT | Performed by: NURSE PRACTITIONER

## 2024-11-06 PROCEDURE — 2500000002 HC RX 250 W HCPCS SELF ADMINISTERED DRUGS (ALT 637 FOR MEDICARE OP, ALT 636 FOR OP/ED): Performed by: STUDENT IN AN ORGANIZED HEALTH CARE EDUCATION/TRAINING PROGRAM

## 2024-11-06 PROCEDURE — 1200000002 HC GENERAL ROOM WITH TELEMETRY DAILY

## 2024-11-06 PROCEDURE — 74177 CT ABD & PELVIS W/CONTRAST: CPT | Performed by: RADIOLOGY

## 2024-11-06 PROCEDURE — 93005 ELECTROCARDIOGRAM TRACING: CPT

## 2024-11-06 PROCEDURE — 80320 DRUG SCREEN QUANTALCOHOLS: CPT | Performed by: EMERGENCY MEDICINE

## 2024-11-06 PROCEDURE — 95816 EEG AWAKE AND DROWSY: CPT

## 2024-11-06 PROCEDURE — 71045 X-RAY EXAM CHEST 1 VIEW: CPT | Performed by: RADIOLOGY

## 2024-11-06 PROCEDURE — 2550000001 HC RX 255 CONTRASTS: Mod: SE | Performed by: EMERGENCY MEDICINE

## 2024-11-06 PROCEDURE — 36415 COLL VENOUS BLD VENIPUNCTURE: CPT

## 2024-11-06 PROCEDURE — 2500000002 HC RX 250 W HCPCS SELF ADMINISTERED DRUGS (ALT 637 FOR MEDICARE OP, ALT 636 FOR OP/ED): Performed by: NURSE PRACTITIONER

## 2024-11-06 PROCEDURE — 84484 ASSAY OF TROPONIN QUANT: CPT

## 2024-11-06 PROCEDURE — 96367 TX/PROPH/DG ADDL SEQ IV INF: CPT | Mod: 59

## 2024-11-06 PROCEDURE — G0378 HOSPITAL OBSERVATION PER HR: HCPCS

## 2024-11-06 PROCEDURE — 87636 SARSCOV2 & INF A&B AMP PRB: CPT

## 2024-11-06 PROCEDURE — 74177 CT ABD & PELVIS W/CONTRAST: CPT

## 2024-11-06 PROCEDURE — 80165 DIPROPYLACETIC ACID FREE: CPT

## 2024-11-06 PROCEDURE — 96366 THER/PROPH/DIAG IV INF ADDON: CPT | Mod: 59

## 2024-11-06 PROCEDURE — 87040 BLOOD CULTURE FOR BACTERIA: CPT | Performed by: EMERGENCY MEDICINE

## 2024-11-06 PROCEDURE — 87449 NOS EACH ORGANISM AG IA: CPT | Performed by: EMERGENCY MEDICINE

## 2024-11-06 RX ORDER — OLANZAPINE 10 MG/2ML
5 INJECTION, POWDER, FOR SOLUTION INTRAMUSCULAR EVERY 6 HOURS PRN
Status: DISCONTINUED | OUTPATIENT
Start: 2024-11-06 | End: 2024-11-08

## 2024-11-06 RX ORDER — ATORVASTATIN CALCIUM 40 MG/1
40 TABLET, FILM COATED ORAL NIGHTLY
Status: DISCONTINUED | OUTPATIENT
Start: 2024-11-06 | End: 2024-11-26 | Stop reason: HOSPADM

## 2024-11-06 RX ORDER — LABETALOL HYDROCHLORIDE 5 MG/ML
20 INJECTION, SOLUTION INTRAVENOUS ONCE
Status: COMPLETED | OUTPATIENT
Start: 2024-11-06 | End: 2024-11-06

## 2024-11-06 RX ORDER — LISINOPRIL 20 MG/1
20 TABLET ORAL DAILY
Status: DISCONTINUED | OUTPATIENT
Start: 2024-11-06 | End: 2024-11-26 | Stop reason: HOSPADM

## 2024-11-06 RX ORDER — PROPRANOLOL HYDROCHLORIDE 10 MG/1
10 TABLET ORAL 3 TIMES DAILY
Status: DISCONTINUED | OUTPATIENT
Start: 2024-11-06 | End: 2024-11-26 | Stop reason: HOSPADM

## 2024-11-06 RX ORDER — ADHESIVE BANDAGE
30 BANDAGE TOPICAL DAILY PRN
COMMUNITY

## 2024-11-06 RX ORDER — CLOZAPINE 100 MG/1
400 TABLET ORAL 2 TIMES DAILY
Status: DISCONTINUED | OUTPATIENT
Start: 2024-11-06 | End: 2024-11-06

## 2024-11-06 RX ORDER — INSULIN LISPRO 100 [IU]/ML
0-10 INJECTION, SOLUTION INTRAVENOUS; SUBCUTANEOUS EVERY 6 HOURS
Status: DISCONTINUED | OUTPATIENT
Start: 2024-11-06 | End: 2024-11-26 | Stop reason: HOSPADM

## 2024-11-06 RX ORDER — CLOZAPINE 100 MG/1
500 TABLET ORAL NIGHTLY
Status: DISPENSED | OUTPATIENT
Start: 2024-11-06 | End: 2024-11-07

## 2024-11-06 RX ORDER — METFORMIN HYDROCHLORIDE 1000 MG/1
1000 TABLET ORAL
Status: DISCONTINUED | OUTPATIENT
Start: 2024-11-06 | End: 2024-11-26 | Stop reason: HOSPADM

## 2024-11-06 RX ORDER — DEXTROSE 50 % IN WATER (D50W) INTRAVENOUS SYRINGE
12.5
Status: DISCONTINUED | OUTPATIENT
Start: 2024-11-06 | End: 2024-11-26 | Stop reason: HOSPADM

## 2024-11-06 RX ORDER — CARBAMAZEPINE 400 MG/1
400 TABLET, EXTENDED RELEASE ORAL NIGHTLY
Status: DISCONTINUED | OUTPATIENT
Start: 2024-11-06 | End: 2024-11-11

## 2024-11-06 RX ORDER — INSULIN GLARGINE 100 [IU]/ML
10 INJECTION, SOLUTION SUBCUTANEOUS NIGHTLY
Status: DISCONTINUED | OUTPATIENT
Start: 2024-11-06 | End: 2024-11-10

## 2024-11-06 RX ORDER — ENOXAPARIN SODIUM 100 MG/ML
40 INJECTION SUBCUTANEOUS EVERY 24 HOURS
Status: DISCONTINUED | OUTPATIENT
Start: 2024-11-06 | End: 2024-11-26 | Stop reason: HOSPADM

## 2024-11-06 RX ORDER — INSULIN GLARGINE 100 [IU]/ML
70 INJECTION, SOLUTION SUBCUTANEOUS NIGHTLY
Status: DISCONTINUED | OUTPATIENT
Start: 2024-11-06 | End: 2024-11-06

## 2024-11-06 RX ORDER — LANOLIN ALCOHOL/MO/W.PET/CERES
100 CREAM (GRAM) TOPICAL DAILY
Status: DISCONTINUED | OUTPATIENT
Start: 2024-11-06 | End: 2024-11-26 | Stop reason: HOSPADM

## 2024-11-06 RX ORDER — SODIUM CHLORIDE 9 MG/ML
100 INJECTION, SOLUTION INTRAVENOUS CONTINUOUS
Status: ACTIVE | OUTPATIENT
Start: 2024-11-06 | End: 2024-11-07

## 2024-11-06 RX ORDER — OLANZAPINE 5 MG/1
5 TABLET ORAL EVERY 6 HOURS PRN
Status: DISCONTINUED | OUTPATIENT
Start: 2024-11-06 | End: 2024-11-08

## 2024-11-06 RX ORDER — BENZTROPINE MESYLATE 0.5 MG/1
0.5 TABLET ORAL 2 TIMES DAILY
Status: DISCONTINUED | OUTPATIENT
Start: 2024-11-06 | End: 2024-11-11

## 2024-11-06 RX ORDER — LORAZEPAM 2 MG/ML
1 INJECTION INTRAMUSCULAR ONCE AS NEEDED
Status: COMPLETED | OUTPATIENT
Start: 2024-11-06 | End: 2024-11-06

## 2024-11-06 RX ORDER — INSULIN LISPRO 100 [IU]/ML
0-10 INJECTION, SOLUTION INTRAVENOUS; SUBCUTANEOUS
Status: DISCONTINUED | OUTPATIENT
Start: 2024-11-06 | End: 2024-11-06

## 2024-11-06 RX ORDER — ASPIRIN 81 MG/1
81 TABLET ORAL DAILY
Status: DISCONTINUED | OUTPATIENT
Start: 2024-11-06 | End: 2024-11-26 | Stop reason: HOSPADM

## 2024-11-06 RX ORDER — AMOXICILLIN 250 MG
2 CAPSULE ORAL 2 TIMES DAILY
Status: DISCONTINUED | OUTPATIENT
Start: 2024-11-06 | End: 2024-11-08 | Stop reason: ALTCHOICE

## 2024-11-06 RX ORDER — VANCOMYCIN/0.9 % SOD CHLORIDE 1 G/100 ML
2 PLASTIC BAG, INJECTION (ML) INTRAVENOUS ONCE
Status: COMPLETED | OUTPATIENT
Start: 2024-11-06 | End: 2024-11-06

## 2024-11-06 RX ORDER — CEFTRIAXONE 2 G/50ML
2 INJECTION, SOLUTION INTRAVENOUS ONCE
Status: COMPLETED | OUTPATIENT
Start: 2024-11-06 | End: 2024-11-06

## 2024-11-06 RX ORDER — DEXTROSE 50 % IN WATER (D50W) INTRAVENOUS SYRINGE
25
Status: DISCONTINUED | OUTPATIENT
Start: 2024-11-06 | End: 2024-11-26 | Stop reason: HOSPADM

## 2024-11-06 RX ORDER — FERROUS SULFATE 325(65) MG
65 TABLET ORAL
Status: DISCONTINUED | OUTPATIENT
Start: 2024-11-07 | End: 2024-11-26 | Stop reason: HOSPADM

## 2024-11-06 RX ORDER — INSULIN GLARGINE 100 [IU]/ML
50 INJECTION, SOLUTION SUBCUTANEOUS NIGHTLY
Status: DISCONTINUED | OUTPATIENT
Start: 2024-11-06 | End: 2024-11-06

## 2024-11-06 RX ORDER — INSULIN LISPRO 100 [IU]/ML
18 INJECTION, SOLUTION INTRAVENOUS; SUBCUTANEOUS
Status: DISCONTINUED | OUTPATIENT
Start: 2024-11-06 | End: 2024-11-26 | Stop reason: HOSPADM

## 2024-11-06 ASSESSMENT — ENCOUNTER SYMPTOMS
GASTROINTESTINAL NEGATIVE: 1
CONSTITUTIONAL NEGATIVE: 1
NEUROLOGICAL NEGATIVE: 1
CONFUSION: 1
CARDIOVASCULAR NEGATIVE: 1
MUSCULOSKELETAL NEGATIVE: 1
EYES NEGATIVE: 1

## 2024-11-06 ASSESSMENT — PAIN SCALES - GENERAL
PAINLEVEL_OUTOF10: 0 - NO PAIN
PAINLEVEL_OUTOF10: 0 - NO PAIN

## 2024-11-06 ASSESSMENT — PAIN SCALES - PAIN ASSESSMENT IN ADVANCED DEMENTIA (PAINAD)
CONSOLABILITY: NO NEED TO CONSOLE
BODYLANGUAGE: RELAXED
TOTALSCORE: 0
FACIALEXPRESSION: SMILING OR INEXPRESSIVE
BREATHING: NORMAL

## 2024-11-06 ASSESSMENT — PAIN SCALES - WONG BAKER: WONGBAKER_NUMERICALRESPONSE: NO HURT

## 2024-11-06 NOTE — PROGRESS NOTES
CHIEF COMPLAINT:   Chief Complaint   Patient presents with     Left Knee - Pain     Left knee osteoarthritis. Has tried, NSAIDS, cortisone, gel-shots, Physical Therapy. Nothing helps. Last cortisone on 10/30/21, maybe a couple weeks of relief.     Knee Pain     Works as  for [employer]. Here for a tka consult.       Phong Cuevas is seen today in the [facility] for evaluation of left knee pain [facility] the [facility]         HISTORY OF PRESENT ILLNESS    Phong Cuevas is a 64 year old male seen for evaluation of ongoing left knee pain with no known injury.   Pain has been present for 5 years. he locates pain throughout the knee with stiffness. Pain aggravated with walking, prolonged sitting then standing, in/out of car. There is stiffness if he sits too long. Treatment has been NSAIDS, Physical Therapy, cortisone injections (most recently 10/30/2021), hyaluronic acid 6/28/2021, without much relief. Last cortisone injection provided only a couple weeks relief, if that.    Ok at rest, night.     He notes left leg is shorter than the right, wonders if that has been putting more stress on the left leg and causing the arthritis to be more severe.      Present symptoms: pain diffuse, pain dull/achy , mild pain, stiffness.    Pain severity: 2/10  Frequency of symptoms: are constant  Exacerbating Factors: weight bearing, stairs, rwge-[id number], prolonged sitting, prolonged standing  Relieving Factors: none  Night Pain: occasional  Pain while at rest: occasional   Numbness or tingling: No   Patient has tried:     NSAIDS: Yes      Physical Therapy: Yes      Activity modification: Yes      Bracing: No      Injections: Yes  Cortisone 10/30/2021, Hyaluronic acid (Durolane) 6/28/2021.     Ice: No      Assistive device:  No    Other PMH:  has no past medical history of Arthritis, Cancer (H), Cerebral infarction (H), Congestive heart failure (H), COPD (chronic  Emergency Department Transition of Care Note       Signout   I received Clayton Parra in signout from Dr. Morrow.  Please see the ED Provider Note for all HPI, PE and MDM up to the time of signout at 0700.  This is in addition to the primary record.    In brief Clayton Parra is an 59 y.o. male presenting for altered mental status.  Heartedly was admitted to [facility] with concerns for decompensated schizophrenia/psychosis.  Was transferred to [facility] however upon arrival found to be increasingly altered and bedbound.  Therefore transported back to ED.  Upon arrival was found to be tachycardic, febrile, diaphoretic, altered mental status.  Reportedly baseline mental status is A&O x 1-2, presented not answering questions.  Given his presentation there was concerns for sepsis/meningitis.  Patient started on broad-spectrum antibiotics for meningitis coverage.  CT head performed was unremarkable.  Unremarkable chest x-ray.    At the time of signout we were awaiting:  Lumbar puncture.    ED Course & Medical Decision Making   Medical Decision Making:  Under my care, see ED course.    ED Course:  ED Course as of 11/06/24 1135   Wed Nov 06, 2024   0830 Patient reevaluated, mild diffuse tremors in bed, staring forward without responding to questioning.  Will obtain CT chest abdomen pelvis for further sepsis evaluation.  Will prepare for lumbar puncture [ITALO]   1135 LP unsuccessful after 2 attempts.  Will admit patient to medicine for further management of his encephalopathy with unclear etiology with differential diagnosis including meningitis versus NMS.  Patient accepted by admission coordinator. [ITALO]      ED Course User Index  [ITALO] Celso Wade PA-C         Diagnoses as of 11/06/24 1135   Altered mental status, unspecified altered mental status type       Disposition   As a result of their workup, the patient will require admission to the hospital.  The patient was informed of his  diagnosis.  The patient was given the opportunity to ask questions and I answered them. The patient agreed to be admitted to the hospital.    Procedures   Procedures    This was a shared visit with an ED attending.  The patient was seen and discussed with the ED attending    Celso Wade PA-C  Emergency Medicine   obstructive pulmonary disease) (H), Depressive disorder, Diabetes (H), Heart disease, History of blood transfusion, Hypertension, Malignant hyperthermia, PONV (postoperative nausea and vomiting), Thyroid disease, or Uncomplicated asthma.  Patient Active Problem List   Diagnosis     Hyperlipidemia LDL goal <100     Abnormal glucose     Morbid obesity (H)     Gastroesophageal reflux disease with esophagitis without hemorrhage       Surgical Hx:  has a past surgical history that includes Colonoscopy (2/20/2014).    Medications:   Current Outpatient Medications:      doxylamine (UNISOM) 25 MG TABS, Take 25 mg by mouth At Bedtime, Disp: , Rfl:      Multiple Vitamins-Minerals (MULTIVITAMIN ADULT PO), , Disp: , Rfl:      simvastatin (ZOCOR) 20 MG tablet, Take 1 tablet (20 mg) by mouth At Bedtime Hold Rx, will notify when needs to have refilled, Disp: 90 tablet, Rfl: 3    Current Facility-Administered Medications:      lidocaine 1 % injection 2 mL, 2 mL, , , Von Thorne, DO, 2 mL at 10/30/21 1110     lidocaine 1 % injection 2 mL, 2 mL, , , Von Thorne, DO, 2 mL at 05/21/21 0913     sodium hyaluronate (DUROLANE) injection 60 mg, 60 mg, , , Kar Polk MD, 60 mg at 06/28/21 0751     triamcinolone (KENALOG-40) injection 40 mg, 40 mg, , , Von Thorne, DO, 40 mg at 10/30/21 1110     triamcinolone (KENALOG-40) injection 40 mg, 40 mg, , , Von Thorne, DO, 40 mg at 05/21/21 0913    Allergies:   Allergies   Allergen Reactions     Nka [No Known Allergies]        Social Hx:  (walking on concrete or sitting).   reports that he has never smoked. He has never used smokeless tobacco. He reports current alcohol use. He reports that he does not use drugs.    Family Hx: family history includes Cancer in his father.    REVIEW OF SYSTEMS: 10 point ROS neg other than the symptoms noted above in the HPI and PMH. Notables include  CONSTITUTIONAL:NEGATIVE for fever, chills, change in  "weight  INTEGUMENTARY/SKIN: NEGATIVE for worrisome rashes, moles or lesions  MUSCULOSKELETAL:See HPI above  NEURO: NEGATIVE for weakness, dizziness or paresthesias    PHYSICAL EXAM:  BP (!) 154/92   Ht 1.746 m (5' 8.75\")   Wt 109.8 kg (242 lb)   BMI 36.00 kg/m     GENERAL APPEARANCE: healthy, alert, no distress  SKIN: no suspicious lesions or rashes  NEURO: Normal strength and tone, mentation intact and speech normal  PSYCH:  mentation appears normal and affect normal, not anxious  RESPIRATORY: No increased work of breathing.  HANDS: no clubbing, nail pitting  LYMPH: no palpable popliteal lymphadenopathy.    BILATERAL LOWER EXTREMITIES:  Gait: antalgic favoring left. Leans to the left.  Alignment: varus  No gross deformities or masses.  No Quad atrophy, strength weak on the left.  Intact sensation deep peroneal nerve, superficial peroneal nerve, med/lat tibial nerve, sural nerve, saphenous nerve  Intact EHL, EDL, TA, FHL, GS, quadriceps hamstrings and hip flexors  Toes warm and well perfused, brisk capillary refill. Palpable 2+ dp pulses.  Bilateral calf soft and nttp or squeeze.  DTRs: achilles 2+, patella 2+.  Edema: 1+, Pitting, bilateral.  Bilateral varicose veins, left more than right.    LEFT KNEE EXAM:    Skin: intact, no ecchymosis or erythema  Squat: 50+% limited by pain.     ROM: 5 extension to 95 flexion  Tight hamstrings on straight leg raise.  Effusion: trace  Tender: medial joint line, anteromedial knee, pes, popliteal fossa (with fullness)  nontender to palpation lateral joint line.    MCL: stable, and non-painful at both 0 and 30 degrees knee flexion  Varus stress: stable, and non-painful at both 0 and 30 degrees knee flexion  Lachmans: neg, firm endpoint  Posterior Drawer stable  Patellofemoral joint:                Apprehension: negative              Crepitations: moderate   Grind: positive.    RIGHT KNEE EXAM:    Skin: intact, no ecchymosis or erythema  ROM: 2 extension to 125 flexion  Tight " hamstrings on straight leg raise.  Effusion: none  Tender: minimal medial joint line; otherwise nontender to palpation llat joint line, anterior or posterior knee  McMurrays: negative    MCL: stable, and non-painful at both 0 and 30 degrees knee flexion  Varus stress: stable, and non-painful at both 0 and 30 degrees knee flexion  Lachmans: neg, firm endpoint  Posterior Drawer stable  Patellofemoral joint:                Apprehension: negative              Crepitations: mild   Grind: positive.    X-RAY: 3 views left knee 1/3/2022   There is a small focus of subchondral lucency in the weightbearing surface of the medial femoral condyle, new since the  prior study and consistent with degenerative cystic change or possible nondisplaced subchondral stress fracture. No other changes. Tricompartmental osteoarthrosis, particularly in the medial compartment which is bone on bone, again noted. No effusion or calcified intra-articular body.      3 views bilateral knee from 5/21/2021 were also reviewed in clinic today. On my review, no obvious fractures or dislocations.   1. Right: Chronic mild scattered elongated heterotopic ossification within the leg. Unremarkable knee. No fracture identified.  2. Left: Medial compartment osteoarthritis with fairly prominent joint space narrowing. There appears to be a loose body posteriorly. Patellar subchondral radiolucency, presumably related to overlying chondromalacia. There is slight lateral patellar subluxation and tilting.         ASSESSMENT/PLAN: Phong Cuevas is a 64 year old male with chronic left knee pain, primary osteoarthritis.    ** Risks of surgery include, but not limited to: bleeding (possibly requiring transfusion), infection, pain, scar, damage to adjacent structures (e.g. Nerves, blood vessels, bone, cartilage), temporary or permanent nerve damage, recurrence of symptoms, implant dislocation, instability, implant failure, implant infection, unequal limb lengths,  "stiffness, need for further surgery, blood clots, pulmonary embolism, risks of anesthesia, and death.  * the knee will not feel \"normal\" as it won't be \"normal\" after knee replacement. It may feel \"clunky\" due to the nature of the hard metal and plastic implant.  * the expectation is for improved pain, not necessarily complete pain relief.    ** understanding these risks, the patient would like to proceed with surgery: LEFT total knee arthroplasty.    * will arrange LEFT total knee arthroplasty at a mutual convenience in the near future  * discussed will plan hospitalization overnight,  daily Physical Therapy starting either the day of or day after surgery, with discharge to home or short term rehabilitation facility, depending on postoperative recovery and progress during hospitalization.  * will plan postoperative deep vein thrombosis prophylaxis x4 weeks. Additionally, graduated compression stockings x4 weeks, and SCDs while in the hospital.  * postoperative pain control will be oral medications upon discharge from hospital  * postoperative Physical Therapy to start upon discharge from the hospital.  * patient will need preoperative H+P prior to surgery from PCP.  * will see patient 2 weeks postoperative, sooner as needed, for wound check, suture removal, and xrays. Will obtain AP, lateral and sunrise views of the knee at that time.    * patient encouraged to call in interim if any new questions or concerns arise.    * recommend no elective dental procedures for 4-6 months after surgery. Any emergency dental procedures, mouth infections, abscesses, etc must be taken care of immediately with preventive antibiotics.   * Patient will need longterm prophylactic antibiotics use with dental procedures or other invasive procedures (2 g amoxicilin or 450mg ([id number]) clindamycin) 1 hour prior to dental procedures for a minimum of 2 years postoperative.            * we had a thorough discussion that given recent bed " shortages due to recent Covid-19 surge, surgeries requiring an overnight stay have been postponed the past several months, so those patients previously canceled/postponed are being rescheduled now and pushing new scheduled patients until Spring, assuming things improve. Our schedulers will be in touch with them regarding scheduling in the future.    Given delays on surgeries currently due to critical bed shortages, he will need to return ~2 weeks prior to surgery for:    Baseline KOOS Jr (not completed today)    Preop information packet/scrub/wipes (not provided today)    Preop covid pcr test order (to be obtained 4 days prior to surgery) (not ordered today)            Herson Sanchez M.D., M.S.  Dept. of Orthopaedic Surgery  [facility]

## 2024-11-06 NOTE — H&P
HPI     Mr. Parra is a 59-year-old male with PMHx: T2DM, schizophrenia, HLD, anemia, HTN, who originally presented to the ED a couple days ago from [facility] for refusing his psych medications.  Was deemed to not have capacity and sent to [facility] for psych admission.  Upon arrival at [facility] patient was found to be alert but nonverbal and not oriented to self place or time.  On arrival to the ED he was found to be diaphoretic rigid.  At the time of this exam he was again diaphoretic and rigid nonverbal his eyes were open but he did not respond to questions and he responded very lightly to noxious stimuli.    While in the ED his vitals were stable.  They attempted an LP unsuccessfully.  His labs were WNL.  CT C/A/P and CT of the head were negative for any acute issues CXR was also negative.  Patient to be admitted for encephalopathy    ROS/EXAM     Review of Systems   Constitutional: Negative.    HENT: Negative.     Eyes: Negative.    Cardiovascular: Negative.    Gastrointestinal: Negative.    Genitourinary: Negative.    Musculoskeletal: Negative.    Skin: Negative.    Neurological: Negative.    Psychiatric/Behavioral:  Positive for confusion.      Physical Exam  Constitutional:       Appearance: Normal appearance. He is ill-appearing and diaphoretic.   HENT:      Head: Normocephalic.      Mouth/Throat:      Mouth: Mucous membranes are dry.   Eyes:      Extraocular Movements: Extraocular movements intact.      Conjunctiva/sclera: Conjunctivae normal.      Pupils: Pupils are equal, round, and reactive to light.   Cardiovascular:      Rate and Rhythm: Normal rate and regular rhythm.      Pulses: Normal pulses.      Heart sounds: Normal heart sounds, S1 normal and S2 normal.   Pulmonary:      Effort: Pulmonary effort is normal.      Breath sounds: Normal breath sounds and air entry.   Abdominal:      General: Abdomen is flat. Bowel sounds are normal.      Palpations: Abdomen is soft.  "  Musculoskeletal:         General: Normal range of motion.      Cervical back: Full passive range of motion without pain and normal range of motion.      Comments: rigid   Skin:     General: Skin is warm.   Neurological:      Mental Status: He is alert. He is disoriented.   Psychiatric:         Attention and Perception: Attention normal.         Mood and Affect: Mood normal.         Speech: Speech normal.         Histories     Past Medical History:   Diagnosis Date    Bipolar 1 disorder (Multi)     Depression     Diabetes mellitus (Multi)     Hypertension     Schizophrenia      History reviewed. No pertinent surgical history.  Family History   Family history unknown: Yes      reports that he has been smoking cigarettes. He has never used smokeless tobacco. He reports that he does not currently use alcohol. He reports that he does not use drugs.    Vitals/LABS/RESULTS     Last Recorded Vitals  Blood pressure 145/60, pulse 86, temperature 37 °C (98.6 °F), temperature source Temporal, resp. rate 18, height 1.727 m (5' 8\"), weight 99.8 kg (220 lb), SpO2 97%.  Intake/Output last 3 Shifts:  I/O last 3 completed shifts:  In: 1050 (10.5 mL/kg) [IV Piggyback:1050]  Out: - (0 mL/kg)   Weight: 99.8 kg     Relevant Results  Lab Results   Component Value Date    WBC 10.2 11/06/2024    HGB 14.6 11/06/2024    HCT 43.2 11/06/2024    MCV 91 11/06/2024     11/06/2024      Lab Results   Component Value Date    GLUCOSE 240 (H) 11/06/2024    CALCIUM 8.9 11/06/2024     11/06/2024    K 4.4 11/06/2024    CO2 21 11/06/2024     11/06/2024    BUN 17 11/06/2024    CREATININE 1.07 11/06/2024     CT chest abdomen pelvis w IV contrast    Result Date: 11/6/2024  Interpreted By:  Dae Faria and Gupta Jayesh STUDY: CT CHEST ABDOMEN PELVIS W IV CONTRAST;  11/6/2024 8:55 am   INDICATION: Signs/Symptoms:sepsis ams.   COMPARISON: None.   ACCESSION NUMBER(S): [id number]   ORDERING CLINICIAN: LUCHO CHANEY   TECHNIQUE: CT " of the chest, abdomen, and pelvis was performed.  Contiguous axial images were obtained at 3 mm slice thickness through the chest, abdomen and pelvis. Coronal and sagittal reconstructions at 3 mm slice thickness were performed. 110 ML of Omnipaque 350 was administered intravenously without immediate complication.   FINDINGS: CHEST:   LUNG/PLEURA/LARGE AIRWAYS: No lung masses, pulmonary nodules or consolidations are present. There is no pleural effusion or pneumothorax.   VESSELS: Aorta and pulmonary arteries are normal caliber.  No atherosclerotic changes of the aorta are identified.  No coronary artery calcifications are present.   HEART: Heart size is within normal limits. No pericardial effusion   MEDIASTINUM AND MERLYN: No mediastinal, hilar or axillary lymphadenopathy is present. Esophagus: Within normal limits.   CHEST WALL AND LOWER NECK: The soft tissues of the chest wall demonstrate no gross abnormality. The visualized thyroid gland appears within normal limits.   ABDOMEN:   LIVER: The liver size is within normal limits and demonstrates diffusely decreased attenuation suggesting steatosis.   BILE DUCTS: The intrahepatic and extrahepatic ducts are not dilated.   GALLBLADDER: No calcified stones. No wall thickening.   PANCREAS: The pancreas appears unremarkable without evidence of ductal dilatation or masses.   SPLEEN: Within normal limits.   ADRENAL GLANDS: Within normal limits.   KIDNEYS AND URETERS: The kidneys enhance symmetrically.  No hydroureteronephrosis or nephroureterolithiasis is identified. Small subcentimeter hypodense well-circumscribed lesion within the left kidney, too small to characterize, likely favoring a simple renal cyst (Series 201, Image 113) .   PELVIS:   BLADDER: No abnormal wall thickening.   REPRODUCTIVE ORGANS: The prostate is not enlarged.   BOWEL: The stomach is unremarkable.  The small and large bowel are normal in caliber and demonstrate no wall thickening.  Unremarkable  appendix.   VESSELS: There is no aneurysmal dilatation of the abdominal aorta. The IVC appears within normal limits.   PERITONEUM/RETROPERITONEUM/LYMPH NODES: No ascites or free air, no fluid collection.  No abdominopelvic lymphadenopathy is present.   BONE AND SOFT TISSUE: No suspicious osseous lesions are identified. Degenerative discogenic disease is noted in the lower thoracic and lumbar spine.  Small umbilical hernia (Series 201, Image 157) .       No etiology for sepsis or altered mental status is evident.   I personally reviewed the images/study and I agree with the findings as stated by Carter Cade MD. This study was interpreted at [place], [place].   MACRO: None   Signed by: Dae Faria 11/6/2024 11:39 AM Dictation workstation:   [id number]    XR chest 1 view    Result Date: 11/6/2024  Interpreted By:  Osiel Chaney and Ritchie Brandon STUDY: XR CHEST 1 VIEW;  11/6/2024 6:04 am   INDICATION: Signs/Symptoms:shortness of breath.   COMPARISON: Chest x-ray 10/29/2024.   ACCESSION NUMBER(S): [id number]   ORDERING CLINICIAN: TIFFANY MARTINEZ   FINDINGS: AP radiograph of the chest was provided.   Lordotic radiograph limiting direct comparison to the prior examination/and limiting assessment of the lower lung fields.   CARDIOMEDIASTINAL SILHOUETTE: Cardiomediastinal silhouette is stable in size and configuration.   LUNGS: No focal consolidation, pleural effusion, or pneumothorax.   ABDOMEN: No remarkable upper abdominal findings.   BONES: No acute osseous changes.       1.  No evidence of acute cardiopulmonary process within limitations of lordotic radiograph.   I personally reviewed the images/study and I agree with the findings as stated by resident Jese Page. This study was interpreted at [place], [place].   MACRO: None   Signed by: Osiel Chaney 11/6/2024 9:17 AM Dictation workstation:    [id number]    CT head wo IV contrast    Result Date: 11/6/2024  Interpreted By:  Royer Reyes and Ritchie Brandon STUDY: CT HEAD WO IV CONTRAST;  11/6/2024 6:03 am   INDICATION: Signs/Symptoms:AMS   COMPARISON: CT head 10/29/2024.   ACCESSION NUMBER(S): [id number]   ORDERING CLINICIAN: TIFFANY MARTINEZ   TECHNIQUE: Axial noncontrast CT images of head with coronal and sagittal reconstructed images.   FINDINGS: CT HEAD:   BRAIN PARENCHYMA:  No acute intraparenchymal hemorrhage or parenchymal evidence of acute large territory ischemic infarct. No mass-effect. Gray-white matter distinction is preserved. Mild chronic small vessel ischemic change suggested.   VENTRICLES and EXTRA-AXIAL SPACES:  No acute extra-axial or intraventricular hemorrhage. No effacement of cerebral sulci. Ventricles and sulci are age-concordant with unchanged atrophy.   PARANASAL SINUSES/MASTOIDS:  No hemorrhage or air-fluid levels within the visualized paranasal sinuses. Small retention cyst in the left sphenoid sinus. The mastoids are well aerated.   CALVARIUM/ORBITS:  No skull fracture. Unchanged small remote inferior right orbital wall fracture. Otherwise the orbits and globes are unremarkable.   EXTRACRANIAL SOFT TISSUES: No discernible hematoma.       CT HEAD: 1. Stable exam. No evidence of acute intracranial process or large territory ischemic infarct.   I personally reviewed the images/study and I agree with the findings as stated by resident Jese Page. This study was interpreted at [place], [place].   MACRO: None.     Signed by: Royer Reyes 11/6/2024 6:43 AM Dictation workstation:   [id number]    Medications     Scheduled medications  [Held by provider] aspirin, 81 mg, oral, Daily  [Held by provider] atorvastatin, 40 mg, oral, Nightly  [Held by provider] benztropine, 0.5 mg, oral, BID  [Held by provider] carBAMazepine XR, 400 mg, oral, Nightly  [Held by provider] cloZAPine,  500 mg, oral, Nightly  enoxaparin, 40 mg, subcutaneous, q24h  [Held by provider] ferrous sulfate (325 mg ferrous sulfate), 65 mg of iron, oral, Daily with breakfast  insulin glargine, 70 Units, subcutaneous, Nightly  insulin lispro, 0-10 Units, subcutaneous, TID  insulin lispro, 18 Units, subcutaneous, TID AC  [Held by provider] lisinopril, 20 mg, oral, Daily  [Held by provider] metFORMIN, 1,000 mg, oral, BID  [Held by provider] propranolol, 10 mg, oral, TID  [Held by provider] sennosides-docusate sodium, 2 tablet, oral, BID  [Held by provider] thiamine, 100 mg, oral, Daily      Continuous medications     PRN medications  PRN medications: dextrose, dextrose, dextrose, dextrose, glucagon, glucagon, glucagon, glucagon, OLANZapine **OR** OLANZapine    PLAN     Mr. Parra is a 59-year-old male with PMHx: T2DM, schizophrenia, HLD, anemia, HTN, who originally presented to the ED a couple days ago from [facility] for refusing his psych medications.  Was deemed to not have capacity and sent to [facility] for psych admission.  Upon arrival at [facility] patient was found to be alert but nonverbal and not oriented to self place or time.  On arrival to the ED he was found to be diaphoretic rigid.  At the time of this exam he was again diaphoretic and rigid nonverbal his eyes were open but he did not respond to questions and he responded very lightly to noxious stimuli.    While in the ED his vitals were stable.  They attempted an LP unsuccessfully.  His labs were WNL.  CT C/A/P and CT of the head were negative for any acute issues CXR was also negative.  Patient to be admitted for encephalopathy  Assessment/Plan:    Encephalopathy--meningitis versus neuroleptic malignant syndrome  Schizophrenia  -Will hold home clozapine, Tegretol, and benztropine, propranolol  -Patient will need clozapine redosed once he starts taking p.o. again  -Psych eval and appreciate recs  -Agitation order set with olanzapine as  needed  -EEG  -Will order creatinine kinase  -Labs: U tox, ethanol, vitamin D, vitamin B12, TSH-all have been negative so far  -LP attempted in the ED unsuccessfully will order LP with IR    T2DM  -Hold home metformin  -Continue home Lantus 70 units nightly  -Continue home Humalog 18 units 3 times daily with meals  -Will also order moderate Humalog SSI 3 times daily and at bedtime    HTN  HLD  -Will hold home aspirin and atorvastatin and lisinopril for now until patient is able to take p.o.    Anemia  -Hgb stable  -Will hold home iron    Fluids: monitor and replete as needed  Electrolytes: monitor and replete as needed  Nutrition: Regular diet   GI prophylaxis: as needed  DVT prophylaxis: SCD  Code Status: full code  PCP  Pharmacy    Disposition:  Admitted for above diagnoses. Plan per above. Anticipate hospitalization >2 midnights.       CECILIA Ramon-CNP         I spent 75 minutes in the professional and overall care on this encounter before, during and after the visit, examining the patient, reviewing labs, writing orders and documenting the note

## 2024-11-06 NOTE — ED PROCEDURE NOTE
Procedure  Lumbar Puncture    Performed by: Celso Wade PA-C  Authorized by: Michael Mcintosh MD    Consent:     Consent obtained:  Verbal    Consent given by:  Spouse (verbally to ED resident Dr Morrow)    Risks, benefits, and alternatives were discussed: yes      Risks discussed:  Bleeding, infection, pain, nerve damage, headache and repeat procedure    Alternatives discussed:  Delayed treatment, alternative treatment and no treatment  Universal protocol:     Procedure explained and questions answered to patient or proxy's satisfaction: yes      Test results available: yes      Imaging studies available: yes      Required blood products, implants, devices, and special equipment available: yes      Immediately prior to procedure a time out was called: yes      Site/side marked: yes      Patient identity confirmed:  Arm band and hospital-assigned identification number  Pre-procedure details:     Procedure purpose:  Diagnostic    Preparation: Patient was prepped and draped in usual sterile fashion    Anesthesia:     Anesthesia method:  Local infiltration    Local anesthetic:  Lidocaine 1% w/o epi  Procedure details:     Lumbar space:  L3-L4 interspace    Patient position:  R lateral decubitus    Needle gauge:  22    Needle type:  Spinal needle - Quincke tip    Needle length (in):  5.0    Ultrasound guidance: yes      Ultrasound guidance use: view anatomy of the area      Number of attempts:  2 (aborted after 2 unsuccessful attempts)  Post-procedure details:     Puncture site:  Direct pressure applied and adhesive bandage applied    Procedure completion:  Tolerated               Celso Wade PA-C  11/06/24 [id number]

## 2024-11-06 NOTE — SIGNIFICANT EVENT
Application for Emergency Admission      Ready for Transfer?  Is the patient medically cleared for transfer to inpatient psychiatry: Yes  Has the patient been accepted to an inpatient psychiatric hospital: Yes    Application for Emergency Admission  IN ACCORDANCE WITH [place]  The Chief Clinical Officer of: Cruz Walker 11/5/2024 .8:30 PM    Reason for Hospitalization  The undersigned has reason to believe that: Clayton Parra Is a mentally ill person subject to hospitalization by court order under division B Section 5122.01 of the Revised Code, i.e., this person:    1.No  Represents a substantial risk of physical harm to self as manifested by evidence of threats of, or attempts at, suicide or serious self-inflicted bodily harm    2.No Represents a substantial risk of physical harm to others as manifested by evidence of recent homicidal or other violent behavior, evidence of recent threats that place another in reasonable fear of violent behavior and serious physical harm, or other evidence of present dangerousness    3.Yes Represents a substantial and immediate risk of serious physical impairment or injury to self as manifested by  evidence that the person is unable to provide for and is not providing for the person's basic physical needs because of the person's mental illness and that appropriate provision for those needs cannot be made  immediately available in the community    4.Yes Would benefit from treatment in a hospital for his mental illness and is in need of such treatment as manifested by evidence of behavior that creates a grave and imminent risk to substantial rights of others or  himself.    5.No Would benefit from treatment as manifested by evidence of behavior that indicates all of the following:       (a) The person is unlikely to survive safely in the community without supervision, based on a clinical determination.       (b) The person has a history of lack of compliance  with treatment for mental illness and one of the following applies:      (i) At least twice within the thirty-six months prior to the filing of an affidavit seeking court-ordered treatment of the person under section 5122.111 of the Revised Code, the lack of compliance has been a significant factor in necessitating hospitalization in a hospital or receipt of services in a forensic or other mental health unit of a correctional facility, provided that the thirty-six-month period shall be extended by the length of any hospitalization or incarceration of the person that occurred within the thirty-six-month period.      (ii) Within the forty-eight months prior to the filing of an affidavit seeking court-ordered treatment of the person under section 5122.111 of the Revised Code, the lack of compliance resulted in one or more acts of serious violent behavior toward self or others or threats of, or attempts at, serious physical harm to self or others, provided that the forty-eight-month period shall be extended by the length of any hospitalization or incarceration of the person that occurred within the forty-eight-month period.      (c) The person, as a result of mental illness, is unlikely to voluntarily participate in necessary treatment.       (d) In view of the person's treatment history and current behavior, the person is in need of treatment in order to prevent a relapse or deterioration that would be likely to result in substantial risk of serious harm to the person or others.    (e) Represents a substantial risk of physical harm to self or others if allowed to remain at liberty pending examination.    Therefore, it is requested that said person be admitted to the above named facility.    STATEMENT OF BELIEF    Must be filled out by one of the following: a psychiatrist, licensed physician, licensed clinical psychologist, health or ,  or .  (Statement shall include the circumstances  under which the individual was taken into custody and the reason for the person's belief that hospitalization is necessary. The statement shall also include a reference to efforts made to secure the individual's property at his residence if he was taken into custody there. Every reasonable and appropriate effort should be made to take this person into custody in the least conspicuous manner possible.)    Patient is evidence of decompensated psychosis and concern for his ability to take care of himself with disconnect from reality.    Simon Hua MD 11/5/2024     _____________________________________________________________   Place of Employment: [facility]    STATEMENT OF OBSERVATION BY PSYCHIATRIST, LICENSED PHYSICIAN, OR LICENSED CLINICAL PSYCHOLOGIST, IF APPLICABLE    Place of Observation (e.g., [facility], general hospital, office, emergency facility)  (If applicable, please complete)    Simon Hua MD 11/5/2024    _____________________________________________________________

## 2024-11-06 NOTE — CONSULTS
"Inpatient consult to Psychiatry  Consult performed by: Piotr Garibay DO  Consult ordered by: Bridget Billingsley, APRN-CNP         HISTORY OF PRESENT ILLNESS:  Clayton Parra is a 59 y.o. male with a past psychiatric history of schizoaffective and a past medical history of diabetes who was admitted to [facility] on 11/6/2024 for AMS. Patient was seen in the ED on 11/4/2024 and went to [facility] for inpatient psychiatry. He then was brought back to [facility] ED on 11/6 for evaluation of altered mental status Psychiatry was consulted on 11/6/2024 for psychiatric evaluation.    On chart review, patient has multiple previous psychiatric admissions for schizophrenia. There is mention of a previous episode (11/2020) of NIKOLAI with elevated CKP in setting of psychiatric hospitalization, appears patient was transferred for RNF and treated with fluids which he seemingly responded to and was then discharged. There are no psychiatric notes for review after 11/2020.    On interview, patient is diffusely tremulous and responds selectively to questioning with nonsensical mumbling and an occasional \"no\". He does allow me to assess passive range of motion of upper extremities which is very rigid. He was not able to provide any meaningful history.      PSYCHIATRIC REVIEW OF SYSTEMS  Unable to assess due to AMS    PSYCHIATRIC HISTORY  Prior diagnoses: schizophrenia spectrum disorder  Prior hospitalizations: multiple- 8 readily identified in EMR spanning from 1355-6266  History of substance use, most recent UDS negative    SUBSTANCE USE HISTORY   He reports that he has been smoking cigarettes. He has never used smokeless tobacco. He reports that he does not currently use alcohol. He reports that he does not use drugs.    SOCIAL HISTORY  Social History     Socioeconomic History    Marital status: Unknown   Tobacco Use    Smoking status: Every Day     Current packs/day: 0.50     Types: Cigarettes    Smokeless tobacco: Never   Substance " and Sexual Activity    Alcohol use: Not Currently    Drug use: Never    Sexual activity: Defer       PAST MEDICAL HISTORY  Past Medical History:   Diagnosis Date    Bipolar 1 disorder (Multi)     Depression     Diabetes mellitus (Multi)     Hypertension     Schizophrenia         PAST SURGICAL HISTORY  History reviewed. No pertinent surgical history.       FAMILY HISTORY  Family History   Family history unknown: Yes        ALLERGIES  Depakote [divalproex], Lithium, and Nsaids (non-steroidal anti-inflammatory drug)    OARRS REVIEW  OARRS checked: 11/6/2024  OARRS comments: No fill hx    L[facility] at [facility]  Guardian: Mother Mehnaz Roach [phone]    OBJECTIVE    VITALS      11/6/2024     5:00 AM 11/6/2024     6:00 AM 11/6/2024     6:18 AM 11/6/2024     7:55 AM 11/6/2024    12:15 PM 11/6/2024     1:20 PM 11/6/2024     3:07 PM   Vitals   Systolic 136 173 173 156 162 220 145   Diastolic 77 65 65 88 78 105 60   Heart Rate 112 105  104 102 106 86   Temp 37.8 °C (100 °F)    37 °C (98.6 °F)     Resp 16 16  18 18 18 18        MENTAL STATUS EXAM  Appearance: laying in bed, eyes held closed, appears uncomfortable  Attitude: briefly oppositional  Behavior: eyes held closed  Motor Activity: global rigidity, upper extremity with no passive range of motion due to severe rigidity, diffuse proximal extremity tremor  Speech: occasionally mumbling  Mood: unable to assess  Affect: unable to assess  Thought Process: unable to assess  Thought Content:  unable to assess  Thought Perception: Likely responding to internal stimuli  Cognition: unable to assess  Insight: unable to assess  Judgement: unable to assess    PHYSICAL EXAM  Gen: laying in bed, uncomfortable appearing, eyes held tightly shut  Resp: unlabored breathing  CV: appears well perfused, distal extremities warm to the touch  MSK: significantly increased muscle tone and diffuse rigidity    MEDICAL REVIEW OF SYSTEMS  Review of Systems   Unable to assess due to  [facility]    HOME MEDICATIONS  Medication Documentation Review Audit       Reviewed by Bridget Billingsley APRN-CNP (Nurse Practitioner) on 11/06/24 at 1334      Medication Order Taking? Sig Documenting Provider Last Dose Status   aspirin 81 mg EC tablet [id number]  Take 1 tablet (81 mg) by mouth once daily. Slime Guthrie MD  Active   atorvastatin (Lipitor) 40 mg tablet [id number]  Take 1 tablet (40 mg) by mouth once daily at bedtime. Slime Provider, MD  Active   benztropine (Cogentin) 0.5 mg tablet [id number]  Take 1 tablet (0.5 mg) by mouth 2 times a day. Historical Provider, MD  Active   carBAMazepine XR (TEGretol  XR) 400 mg 12 hr tablet [id number]  Take 1 tablet (400 mg) by mouth once daily at bedtime. Historical Provider, MD  Active   cloZAPine (Clozaril) 100 mg tablet [id number]  Take 1 tablet (100 mg) by mouth once daily at bedtime. To be given with 400 mg dose for a total of 500 mg nightly Historical Provider, MD  Active   cloZAPine (Clozaril) 200 mg tablet [id number]  Take 2 tablets (400 mg) by mouth 2 times a day. Historical Provider, MD  Active   ferrous sulfate, 325 mg ferrous sulfate, tablet [id number]  Take 1 tablet (325 mg) by mouth once daily with breakfast. Historical Provider, MD  Active   HaldoL Decanoate 100 mg/mL injection [id number]  Inject 2 mL (200 mg) into the muscle every 28 (twenty-eight) days. Last received 10/26/2024 Slime Guthrie MD  Active   HumaLOG KwikPen Insulin 200 unit/mL (3 mL) insulin pen pen [id number]  Inject 18 Units under the skin 3 times a day. With meals. Give 9 units for blood sugar <100 mg/dL Historical Provider, MD  Active   Lantus Solostar U-100 Insulin 100 unit/mL (3 mL) pen [id number]  Inject 70 Units under the skin once daily at bedtime. Historical Provider, MD  Active   lisinopril 20 mg tablet [id number]  Take 1 tablet (20 mg) by mouth once daily. Historical Provider, MD  Active   magnesium hydroxide (Milk of Magnesia) 400 mg/5 mL suspension [id number]  Take 30 mL  by mouth once daily as needed. Historical Provider, MD  Active   melatonin 3 mg tablet [id number]  Take 1 tablet (3 mg) by mouth once daily at bedtime. Historical Provider, MD  Active   metFORMIN (Glucophage) 1,000 mg tablet [id number]  Take 1 tablet (1,000 mg) by mouth 2 times daily (morning and late afternoon). Historical Provider, MD  Active   propranolol (Inderal) 10 mg tablet [id number]  Take 1 tablet (10 mg) by mouth 3 times a day. Historical Provider, MD  Active   thiamine 100 mg tablet [id number]  Take 1 tablet (100 mg) by mouth once daily. Historical Provider, MD  Active                     CURRENT MEDICATIONS  Scheduled medications  [Held by provider] aspirin, 81 mg, oral, Daily  [Held by provider] atorvastatin, 40 mg, oral, Nightly  [Held by provider] benztropine, 0.5 mg, oral, BID  [Held by provider] carBAMazepine XR, 400 mg, oral, Nightly  [Held by provider] cloZAPine, 500 mg, oral, Nightly  enoxaparin, 40 mg, subcutaneous, q24h  [Held by provider] ferrous sulfate (325 mg ferrous sulfate), 65 mg of iron, oral, Daily with breakfast  insulin glargine, 70 Units, subcutaneous, Nightly  insulin lispro, 0-10 Units, subcutaneous, TID  insulin lispro, 18 Units, subcutaneous, TID AC  [Held by provider] lisinopril, 20 mg, oral, Daily  [Held by provider] metFORMIN, 1,000 mg, oral, BID  [Held by provider] propranolol, 10 mg, oral, TID  [Held by provider] sennosides-docusate sodium, 2 tablet, oral, BID  [Held by provider] thiamine, 100 mg, oral, Daily        Continuous medications       PRN medications  PRN medications: dextrose, dextrose, dextrose, dextrose, glucagon, glucagon, glucagon, glucagon, OLANZapine **OR** OLANZapine     LABS  Results for orders placed or performed during the hospital encounter of 11/06/24 (from the past 24 hours)   Troponin I, High Sensitivity   Result Value Ref Range    Troponin I, High Sensitivity (CMC) 4 0 - 53 ng/L   CBC and Auto Differential   Result Value Ref Range    WBC 10.2 4.4 -  11.3 x10*3/uL    nRBC 0.0 0.0 - 0.0 /100 WBCs    RBC 4.73 4.50 - 5.90 x10*6/uL    Hemoglobin 14.6 13.5 - 17.5 g/dL    Hematocrit 43.2 41.0 - 52.0 %    MCV 91 80 - 100 fL    MCH 30.9 26.0 - 34.0 pg    MCHC 33.8 32.0 - 36.0 g/dL    RDW 13.2 11.5 - 14.5 %    Platelets 217 150 - 450 x10*3/uL    Neutrophils % 68.7 40.0 - 80.0 %    Immature Granulocytes %, Automated 0.4 0.0 - 0.9 %    Lymphocytes % 18.8 13.0 - 44.0 %    Monocytes % 10.0 2.0 - 10.0 %    Eosinophils % 1.6 0.0 - 6.0 %    Basophils % 0.5 0.0 - 2.0 %    Neutrophils Absolute 7.01 1.20 - 7.70 x10*3/uL    Immature Granulocytes Absolute, Automated 0.04 0.00 - 0.70 x10*3/uL    Lymphocytes Absolute 1.92 1.20 - 4.80 x10*3/uL    Monocytes Absolute 1.02 (H) 0.10 - 1.00 x10*3/uL    Eosinophils Absolute 0.16 0.00 - 0.70 x10*3/uL    Basophils Absolute 0.05 0.00 - 0.10 x10*3/uL   Magnesium   Result Value Ref Range    Magnesium 2.02 1.60 - 2.40 mg/dL   Renal function panel   Result Value Ref Range    Glucose 240 (H) 74 - 99 mg/dL    Sodium 141 136 - 145 mmol/L    Potassium 4.4 3.5 - 5.3 mmol/L    Chloride 101 98 - 107 mmol/L    Bicarbonate 21 21 - 32 mmol/L    Anion Gap 23 (H) 10 - 20 mmol/L    Urea Nitrogen 17 6 - 23 mg/dL    Creatinine 1.07 0.50 - 1.30 mg/dL    eGFR 79 >60 mL/min/1.73m*2    Calcium 8.9 8.6 - 10.6 mg/dL    Phosphorus 3.7 2.5 - 4.9 mg/dL    Albumin 4.6 3.4 - 5.0 g/dL   TSH with reflex to Free T4 if abnormal   Result Value Ref Range    Thyroid Stimulating Hormone 2.02 0.44 - 3.98 mIU/L   Acute Toxicology Panel, Blood   Result Value Ref Range    Acetaminophen <10.0 10.0 - 30.0 ug/mL    Salicylate  <3 4 - 20 mg/dL    Alcohol <10 <=10 mg/dL   Vitamin D 25-Hydroxy,Total (for eval of Vitamin D levels)   Result Value Ref Range    Vitamin D, 25-Hydroxy, Total 29 (L) 30 - 100 ng/mL   Influenza A, and B PCR   Result Value Ref Range    Flu A Result Not Detected Not Detected    Flu B Result Not Detected Not Detected   Sars-CoV-2 PCR   Result Value Ref Range     Coronavirus 2019, PCR Not Detected Not Detected   POCT GLUCOSE   Result Value Ref Range    POCT Glucose 243 (H) 74 - 99 mg/dL   Blood Culture    Specimen: Peripheral Venipuncture; Blood culture   Result Value Ref Range    Blood Culture Loaded on Instrument - Culture in progress    Blood Culture    Specimen: Peripheral Venipuncture; Blood culture   Result Value Ref Range    Blood Culture Loaded on Instrument - Culture in progress    DRUG SCREEN,URINE   Result Value Ref Range    Amphetamine Screen, Urine Presumptive Negative Presumptive Negative    Barbiturate Screen, Urine Presumptive Negative Presumptive Negative    Benzodiazepines Screen, Urine Presumptive Negative Presumptive Negative    Cannabinoid Screen, Urine Presumptive Negative Presumptive Negative    Cocaine Metabolite Screen, Urine Presumptive Negative Presumptive Negative    Fentanyl Screen, Urine Presumptive Negative Presumptive Negative    Opiate Screen, Urine Presumptive Negative Presumptive Negative    Oxycodone Screen, Urine Presumptive Negative Presumptive Negative    PCP Screen, Urine Presumptive Negative Presumptive Negative    Methadone Screen, Urine Presumptive Negative Presumptive Negative   POCT GLUCOSE   Result Value Ref Range    POCT Glucose 213 (H) 74 - 99 mg/dL        IMAGING  CT chest abdomen pelvis w IV contrast    Result Date: 11/6/2024  Interpreted By:  Dae Faria and Gupta Jayesh STUDY: CT CHEST ABDOMEN PELVIS W IV CONTRAST;  11/6/2024 8:55 am   INDICATION: Signs/Symptoms:sepsis ams.   COMPARISON: None.   ACCESSION NUMBER(S): [id number]   ORDERING CLINICIAN: LUCHO CHANEY   TECHNIQUE: CT of the chest, abdomen, and pelvis was performed.  Contiguous axial images were obtained at 3 mm slice thickness through the chest, abdomen and pelvis. Coronal and sagittal reconstructions at 3 mm slice thickness were performed. 110 ML of Omnipaque 350 was administered intravenously without immediate complication.   FINDINGS: CHEST:    LUNG/PLEURA/LARGE AIRWAYS: No lung masses, pulmonary nodules or consolidations are present. There is no pleural effusion or pneumothorax.   VESSELS: Aorta and pulmonary arteries are normal caliber.  No atherosclerotic changes of the aorta are identified.  No coronary artery calcifications are present.   HEART: Heart size is within normal limits. No pericardial effusion   MEDIASTINUM AND MERLYN: No mediastinal, hilar or axillary lymphadenopathy is present. Esophagus: Within normal limits.   CHEST WALL AND LOWER NECK: The soft tissues of the chest wall demonstrate no gross abnormality. The visualized thyroid gland appears within normal limits.   ABDOMEN:   LIVER: The liver size is within normal limits and demonstrates diffusely decreased attenuation suggesting steatosis.   BILE DUCTS: The intrahepatic and extrahepatic ducts are not dilated.   GALLBLADDER: No calcified stones. No wall thickening.   PANCREAS: The pancreas appears unremarkable without evidence of ductal dilatation or masses.   SPLEEN: Within normal limits.   ADRENAL GLANDS: Within normal limits.   KIDNEYS AND URETERS: The kidneys enhance symmetrically.  No hydroureteronephrosis or nephroureterolithiasis is identified. Small subcentimeter hypodense well-circumscribed lesion within the left kidney, too small to characterize, likely favoring a simple renal cyst (Series 201, Image 113) .   PELVIS:   BLADDER: No abnormal wall thickening.   REPRODUCTIVE ORGANS: The prostate is not enlarged.   BOWEL: The stomach is unremarkable.  The small and large bowel are normal in caliber and demonstrate no wall thickening.  Unremarkable appendix.   VESSELS: There is no aneurysmal dilatation of the abdominal aorta. The IVC appears within normal limits.   PERITONEUM/RETROPERITONEUM/LYMPH NODES: No ascites or free air, no fluid collection.  No abdominopelvic lymphadenopathy is present.   BONE AND SOFT TISSUE: No suspicious osseous lesions are identified. Degenerative  discogenic disease is noted in the lower thoracic and lumbar spine.  Small umbilical hernia (Series 201, Image 157) .       No etiology for sepsis or altered mental status is evident.   I personally reviewed the images/study and I agree with the findings as stated by Carter Cade MD. This study was interpreted at [facility], [place], [place].   MACRO: None   Signed by: Dae Faria 11/6/2024 11:39 AM Dictation workstation:   [id number]    XR chest 1 view    Result Date: 11/6/2024  Interpreted By:  Osiel Chaney and Ritchie Brandon STUDY: XR CHEST 1 VIEW;  11/6/2024 6:04 am   INDICATION: Signs/Symptoms:shortness of breath.   COMPARISON: Chest x-ray 10/29/2024.   ACCESSION NUMBER(S): [id number]   ORDERING CLINICIAN: TIFFANY MARTINEZ   FINDINGS: AP radiograph of the chest was provided.   Lordotic radiograph limiting direct comparison to the prior examination/and limiting assessment of the lower lung fields.   CARDIOMEDIASTINAL SILHOUETTE: Cardiomediastinal silhouette is stable in size and configuration.   LUNGS: No focal consolidation, pleural effusion, or pneumothorax.   ABDOMEN: No remarkable upper abdominal findings.   BONES: No acute osseous changes.       1.  No evidence of acute cardiopulmonary process within limitations of lordotic radiograph.   I personally reviewed the images/study and I agree with the findings as stated by resident Jese Page. This study was interpreted at [facility], [place], [place].   MACRO: None   Signed by: Osiel Chaney 11/6/2024 9:17 AM Dictation workstation:   [id number]    CT head wo IV contrast    Result Date: 11/6/2024  Interpreted By:  Royer Reyes and Ritchie Brandon STUDY: CT HEAD WO IV CONTRAST;  11/6/2024 6:03 am   INDICATION: Signs/Symptoms:AMS   COMPARISON: CT head 10/29/2024.   ACCESSION NUMBER(S): [id number]   ORDERING CLINICIAN: TIFFANY MARTINEZ   TECHNIQUE: Axial noncontrast CT  images of head with coronal and sagittal reconstructed images.   FINDINGS: CT HEAD:   BRAIN PARENCHYMA:  No acute intraparenchymal hemorrhage or parenchymal evidence of acute large territory ischemic infarct. No mass-effect. Gray-white matter distinction is preserved. Mild chronic small vessel ischemic change suggested.   VENTRICLES and EXTRA-AXIAL SPACES:  No acute extra-axial or intraventricular hemorrhage. No effacement of cerebral sulci. Ventricles and sulci are age-concordant with unchanged atrophy.   PARANASAL SINUSES/MASTOIDS:  No hemorrhage or air-fluid levels within the visualized paranasal sinuses. Small retention cyst in the left sphenoid sinus. The mastoids are well aerated.   CALVARIUM/ORBITS:  No skull fracture. Unchanged small remote inferior right orbital wall fracture. Otherwise the orbits and globes are unremarkable.   EXTRACRANIAL SOFT TISSUES: No discernible hematoma.       CT HEAD: 1. Stable exam. No evidence of acute intracranial process or large territory ischemic infarct.   I personally reviewed the images/study and I agree with the findings as stated by resident Jese Page. This study was interpreted at [place], [place].   MACRO: None.     Signed by: Royer Reyes 11/6/2024 6:43 AM Dictation workstation:   [id number]    Electrocardiogram, 12-lead    Result Date: 11/5/2024  Sinus tachycardia Possible Left atrial enlargement Borderline ECG When compared with ECG of 26-NOV-2020 14:20, No significant change was found See ED provider note for full interpretation and clinical correlation Confirmed by Shanel Hsieh ([id number]) on 11/5/2024 3:23:57 AM      PSYCHIATRIC RISK ASSESSMENT  Unable to assess due to altered mentation    ASSESSMENT AND PLAN  59M history of schizophrenia (bipolar?) and diabetes, who returned to ED from inpatient psych for medical exam due to worsening alterations in mentation, refusal to take PO, and unstable vital signs.  Psychiatry was consulted on 11/6 for psychiatric evaluation. His outpatient medication regimen appears to be benztropine 0.5 mg BID, carbamazepine 400 mg nightly, clozapine 500 mg nightly, propranolol 10 mg TID.    On initial assessment, patient is unable to participate in examination due to altered mentation. He is diffusely rigid and tremulous. He is also approaching 48 hour devon since last clozapine dose. At this juncture, will need robust workup to rule out infectious/metabolic causes of altered mentation, rigidity, vital instability. Broad differential would consider infectious causes, metabolic derangements, NMS, encephalitis, meningitis, catatonia, clozapine withdrawal. At present would avoid antipsychotic administration until NMS is ruled out, may be more beneficial to utilize benzodiazepines while monitoring for vital depression.     EKG (11/6/2024): QTc of 443ms    IMPRESSION  Encephalopathy  Schizophrenia     RECOMMENDATIONS    Safety:  - Patient does currently meet criteria for inpatient psychiatric admission. Once patient is deemed medically cleared, please document in note that patient is MEDICALLY CLEARED and [facility] at [phone], pager [phone]. Issue Application for Emergency Admission (pink slip) only after patient is accepted to an inpatient psychiatric unit and is ready to be discharged. Search “Application for Emergency Admission” under SmartText.”  - Patient lacks the capacity to leave AMA at this time and thus cannot leave AMA. Call [facility] if patient attempts to leave AMA.  - Patient does require a 1:1 sitter from a psychiatric perspective at this time.  - As with all hospitalized patients, would recommend delirium precautions, as below.  - No MPU beds currently, if bed becomes available can consider transfer for co management    Work-up:  - Recommend checking CK to assess for NMS given vital instability and rigidity    Medications:  - HOLD clozapine due to inability to take  medications PO, will need to re-titrate after resolution of acute illness and ability to tolerate PO intake  - HOLD benztropine   - PRN for agitation: Lorazepam 2 mg IM q6h PRN agitation    - Monitor vitals closely for evidence of vital depression    Ancillary Services:  - will continue to assess, at present would not be able to engage    Follow-up:  - will continue to assess, likely will need to return to inpatient psych      - Discussed recommendations with primary team.  - Psychiatry will continue to follow    Thank you for allowing us to participate in the care of this patient. Please page [phone] with any questions or concerns.    Patient seen and staffed with Dr. Miller, who agrees with above plan.    Medication Consent  Medication Consent: n/a; consult service    Piotr Garibay, DO  PGY2 Psychiatry Resident

## 2024-11-06 NOTE — ED PROVIDER NOTES
History of Present Illness     History provided by: Patient and EMS  Limitations to History: Altered Mental Status and Confusion  External Records Reviewed with Brief Summary:  Previous ED visits, most recently from earlier today    HPI:  Clayton Parra is a 59 y.o. male past medical history of hypertension hyperlipidemia schizophrenia diabetes who presents today for psychiatric evaluation.  Patient was initially here, admitted to [facility], however, when he arrived there they realized that he is bedbound.  Given the fact that the patient is bedbound, they are not able to take him, so return to him here.  Upon arrival, the patient looked much worse than he did previously.  He is unable to provide any additional history given his degree of confusion, is ANO x 0    Physical Exam   Triage vitals:  T 37.2 °C (99 °F)    BP (!) 177/94  RR 18  O2 98 % None (Room air)    Physical Exam  Vitals and nursing note reviewed.   Constitutional:       General: He is not in acute distress.     Appearance: Normal appearance. He is ill-appearing, toxic-appearing and diaphoretic.   HENT:      Head: Normocephalic and atraumatic.      Nose: Congestion and rhinorrhea present.      Mouth/Throat:      Mouth: Mucous membranes are dry.      Pharynx: No oropharyngeal exudate or posterior oropharyngeal erythema.   Eyes:      General: No scleral icterus.     Extraocular Movements: Extraocular movements intact.      Pupils: Pupils are equal, round, and reactive to light.   Cardiovascular:      Rate and Rhythm: Regular rhythm. Tachycardia present.      Pulses: Normal pulses.      Heart sounds: Normal heart sounds. No murmur heard.     No gallop.   Pulmonary:      Effort: Pulmonary effort is normal. No respiratory distress.      Breath sounds: No stridor. Rales present. No wheezing or rhonchi.   Abdominal:      General: Bowel sounds are normal. There is no distension.      Palpations: Abdomen is soft. There is no mass.       Tenderness: There is no abdominal tenderness. There is no guarding or rebound.      Hernia: No hernia is present.   Musculoskeletal:         General: No swelling, deformity or signs of injury. Normal range of motion.      Cervical back: Normal range of motion and neck supple. No tenderness.   Skin:     General: Skin is warm.      Capillary Refill: Capillary refill takes less than 2 seconds.      Findings: No erythema, lesion or rash.      Comments: Patient extremely diaphoretic with sweat pooling in his ears   Neurological:      General: No focal deficit present.      Mental Status: He is alert.      Comments: ANO x 0, new from baseline within the last 24 hours   Psychiatric:         Mood and Affect: Mood normal.         Behavior: Behavior normal.          Medical Decision Making & ED Course   Medical Decision Makin y.o. male past medical history of hypertension hyperlipidemia diabetes schizophrenia currently on Haldol [place] Clozaril who presents today for psychiatric evaluation.  Patient appears acutely ill upon presentation.  Given this, we will restart her workup including labs as well as blood culture urinalysis and chest x-ray.  Patient does appear acutely ill, so we will cover him broadly with antibiotics especially in the setting of him being in the ED for 24 hours prior to presentation.  Patient's chest x-ray does demonstrate evidence of a right lower and right middle lobe pneumonia, which may be the source of the patient's tachycardia and fever as well as his sweating.      We did also consider getting a TSH as well as tox labs to confirm the patient is not having thyrotoxicosis as a reason for this.  Patient will be signed out to the oncoming team pending acceptance to the admissions coordinator for sepsis likely in the setting of pneumonia however empiric broad-spectrum antibiotics ordered for CNS coverage and plan for lumbar puncture.  Received verbal consent by phone from patient's legal  guardian.  ----      Differential diagnoses considered include but are not limited to: Meningitis, encephalitis, pneumonia, UTI/pyelonephritis     Social Determinants of Health which Significantly Impact Care: None identified     EKG Independent Interpretation: EKG interpreted by myself. Please see ED Course for full interpretation.    Independent Result Review and Interpretation: Relevant laboratory and radiographic results were reviewed and independently interpreted by myself.  As necessary, they are commented on in the ED Course.    Chronic conditions affecting the patient's care: As documented above in [facility]    The patient was discussed with the following consultants/services: None    Care Considerations: As documented above in [facility]    ED Course:  ED Course as of 11/08/24 0000   Wed Nov 06, 2024   0830 Patient reevaluated, mild diffuse tremors in bed, staring forward without responding to questioning.  Will obtain CT chest abdomen pelvis for further sepsis evaluation.  Will prepare for lumbar puncture [ITALO]   1135 LP unsuccessful after 2 attempts.  Will admit patient to medicine for further management of his encephalopathy with unclear etiology with differential diagnosis including meningitis versus NMS.  Patient accepted by admission coordinator. [ITALO]      ED Course User Index  [ITALO] Celso Wade PA-C         Diagnoses as of 11/08/24 0000   Altered mental status, unspecified altered mental status type     Disposition   Patient was signed out to Dr. Mcintosh at 0700 pending completion of their work-up.  Please see the next provider's transition of care note for the remainder of the patient's care.     Procedures   Procedures    Patient seen and discussed with ED attending physician.    Iker Morrow MD  Emergency Medicine       Iker Morrow MD  Resident  11/08/24 0005    The patient was seen by the resident.  I have personally performed a substantive portion of the encounter.  I have seen and examined the  patient and agree with the workup, evaluation, MDM, management and diagnosis.  The care plan has been discussed with the resident. I have reviewed the resident's note and agree with the documented findings.    MD Pedro Morton MD  11/09/24 0035

## 2024-11-07 LAB
ALBUMIN SERPL BCP-MCNC: 4.2 G/DL (ref 3.4–5)
ANION GAP SERPL CALC-SCNC: 18 MMOL/L (ref 10–20)
ATRIAL RATE: 110 BPM
BUN SERPL-MCNC: 14 MG/DL (ref 6–23)
CALCIUM SERPL-MCNC: 8.6 MG/DL (ref 8.6–10.6)
CHLORIDE SERPL-SCNC: 111 MMOL/L (ref 98–107)
CK SERPL-CCNC: 267 U/L (ref 0–325)
CO2 SERPL-SCNC: 22 MMOL/L (ref 21–32)
CREAT SERPL-MCNC: 1.02 MG/DL (ref 0.5–1.3)
EGFRCR SERPLBLD CKD-EPI 2021: 84 ML/MIN/1.73M*2
ERYTHROCYTE [DISTWIDTH] IN BLOOD BY AUTOMATED COUNT: 13.6 % (ref 11.5–14.5)
GLUCOSE BLD MANUAL STRIP-MCNC: 101 MG/DL (ref 74–99)
GLUCOSE BLD MANUAL STRIP-MCNC: 106 MG/DL (ref 74–99)
GLUCOSE BLD MANUAL STRIP-MCNC: 210 MG/DL (ref 74–99)
GLUCOSE BLD MANUAL STRIP-MCNC: 216 MG/DL (ref 74–99)
GLUCOSE SERPL-MCNC: 201 MG/DL (ref 74–99)
HCT VFR BLD AUTO: 40.9 % (ref 41–52)
HGB BLD-MCNC: 13.2 G/DL (ref 13.5–17.5)
INR PPP: 1.1 (ref 0.9–1.1)
LEGIONELLA AG UR QL: NEGATIVE
MCH RBC QN AUTO: 31.2 PG (ref 26–34)
MCHC RBC AUTO-ENTMCNC: 32.3 G/DL (ref 32–36)
MCV RBC AUTO: 97 FL (ref 80–100)
NRBC BLD-RTO: 0 /100 WBCS (ref 0–0)
P AXIS: 57 DEGREES
P OFFSET: 204 MS
P ONSET: 151 MS
PHOSPHATE SERPL-MCNC: 2.6 MG/DL (ref 2.5–4.9)
PLATELET # BLD AUTO: 195 X10*3/UL (ref 150–450)
POTASSIUM SERPL-SCNC: 3.9 MMOL/L (ref 3.5–5.3)
PR INTERVAL: 136 MS
PROCALCITONIN SERPL-MCNC: 0.07 NG/ML
PROTHROMBIN TIME: 12.8 SECONDS (ref 9.8–12.8)
Q ONSET: 219 MS
QRS COUNT: 18 BEATS
QRS DURATION: 72 MS
QT INTERVAL: 326 MS
QTC CALCULATION(BAZETT): 441 MS
QTC FREDERICIA: 399 MS
R AXIS: 51 DEGREES
RBC # BLD AUTO: 4.23 X10*6/UL (ref 4.5–5.9)
S PNEUM AG UR QL: NEGATIVE
SCAN RESULT: ABNORMAL
SODIUM SERPL-SCNC: 147 MMOL/L (ref 136–145)
T AXIS: 22 DEGREES
T OFFSET: 382 MS
VALPROATE FREE SERPL-MCNC: <1.3 UG/ML (ref 4–30)
VENTRICULAR RATE: 110 BPM
WBC # BLD AUTO: 8.1 X10*3/UL (ref 4.4–11.3)

## 2024-11-07 PROCEDURE — 2500000004 HC RX 250 GENERAL PHARMACY W/ HCPCS (ALT 636 FOR OP/ED): Performed by: STUDENT IN AN ORGANIZED HEALTH CARE EDUCATION/TRAINING PROGRAM

## 2024-11-07 PROCEDURE — 82947 ASSAY GLUCOSE BLOOD QUANT: CPT

## 2024-11-07 PROCEDURE — 99232 SBSQ HOSP IP/OBS MODERATE 35: CPT | Performed by: STUDENT IN AN ORGANIZED HEALTH CARE EDUCATION/TRAINING PROGRAM

## 2024-11-07 PROCEDURE — 99232 SBSQ HOSP IP/OBS MODERATE 35: CPT

## 2024-11-07 PROCEDURE — 80069 RENAL FUNCTION PANEL: CPT | Performed by: STUDENT IN AN ORGANIZED HEALTH CARE EDUCATION/TRAINING PROGRAM

## 2024-11-07 PROCEDURE — 1200000002 HC GENERAL ROOM WITH TELEMETRY DAILY

## 2024-11-07 PROCEDURE — 2500000002 HC RX 250 W HCPCS SELF ADMINISTERED DRUGS (ALT 637 FOR MEDICARE OP, ALT 636 FOR OP/ED): Performed by: STUDENT IN AN ORGANIZED HEALTH CARE EDUCATION/TRAINING PROGRAM

## 2024-11-07 PROCEDURE — 36415 COLL VENOUS BLD VENIPUNCTURE: CPT | Performed by: STUDENT IN AN ORGANIZED HEALTH CARE EDUCATION/TRAINING PROGRAM

## 2024-11-07 PROCEDURE — 82550 ASSAY OF CK (CPK): CPT | Performed by: NURSE PRACTITIONER

## 2024-11-07 PROCEDURE — 2500000001 HC RX 250 WO HCPCS SELF ADMINISTERED DRUGS (ALT 637 FOR MEDICARE OP): Performed by: STUDENT IN AN ORGANIZED HEALTH CARE EDUCATION/TRAINING PROGRAM

## 2024-11-07 PROCEDURE — 85027 COMPLETE CBC AUTOMATED: CPT | Performed by: STUDENT IN AN ORGANIZED HEALTH CARE EDUCATION/TRAINING PROGRAM

## 2024-11-07 PROCEDURE — 2500000004 HC RX 250 GENERAL PHARMACY W/ HCPCS (ALT 636 FOR OP/ED): Performed by: NURSE PRACTITIONER

## 2024-11-07 PROCEDURE — 85610 PROTHROMBIN TIME: CPT | Performed by: NURSE PRACTITIONER

## 2024-11-07 PROCEDURE — 36415 COLL VENOUS BLD VENIPUNCTURE: CPT | Performed by: NURSE PRACTITIONER

## 2024-11-07 PROCEDURE — 2500000002 HC RX 250 W HCPCS SELF ADMINISTERED DRUGS (ALT 637 FOR MEDICARE OP, ALT 636 FOR OP/ED): Performed by: NURSE PRACTITIONER

## 2024-11-07 PROCEDURE — 84145 PROCALCITONIN (PCT): CPT | Performed by: STUDENT IN AN ORGANIZED HEALTH CARE EDUCATION/TRAINING PROGRAM

## 2024-11-07 RX ORDER — LORAZEPAM 2 MG/ML
1 INJECTION INTRAMUSCULAR EVERY 4 HOURS PRN
Status: DISCONTINUED | OUTPATIENT
Start: 2024-11-07 | End: 2024-11-07

## 2024-11-07 RX ORDER — LORAZEPAM 2 MG/ML
1 INJECTION INTRAMUSCULAR EVERY 6 HOURS
Status: DISCONTINUED | OUTPATIENT
Start: 2024-11-07 | End: 2024-11-09

## 2024-11-07 RX ORDER — LORAZEPAM 2 MG/ML
1 INJECTION INTRAMUSCULAR ONCE
Status: COMPLETED | OUTPATIENT
Start: 2024-11-07 | End: 2024-11-07

## 2024-11-07 RX ORDER — THIAMINE HYDROCHLORIDE 100 MG/ML
500 INJECTION, SOLUTION INTRAMUSCULAR; INTRAVENOUS DAILY
Status: COMPLETED | OUTPATIENT
Start: 2024-11-07 | End: 2024-11-07

## 2024-11-07 RX ORDER — METOPROLOL TARTRATE 1 MG/ML
5 INJECTION, SOLUTION INTRAVENOUS EVERY 6 HOURS PRN
Status: DISCONTINUED | OUTPATIENT
Start: 2024-11-07 | End: 2024-11-26 | Stop reason: HOSPADM

## 2024-11-07 RX ORDER — THIAMINE HYDROCHLORIDE 100 MG/ML
500 INJECTION, SOLUTION INTRAMUSCULAR; INTRAVENOUS DAILY
Status: DISPENSED | OUTPATIENT
Start: 2024-11-08 | End: 2024-11-12

## 2024-11-07 SDOH — SOCIAL STABILITY: SOCIAL INSECURITY: DO YOU FEEL ANYONE HAS EXPLOITED OR TAKEN ADVANTAGE OF YOU FINANCIALLY OR OF YOUR PERSONAL PROPERTY?: UNABLE TO ASSESS

## 2024-11-07 SDOH — SOCIAL STABILITY: SOCIAL INSECURITY: HAVE YOU HAD THOUGHTS OF HARMING ANYONE ELSE?: UNABLE TO ASSESS

## 2024-11-07 SDOH — SOCIAL STABILITY: SOCIAL INSECURITY: ARE THERE ANY APPARENT SIGNS OF INJURIES/BEHAVIORS THAT COULD BE RELATED TO ABUSE/NEGLECT?: UNABLE TO ASSESS

## 2024-11-07 SDOH — SOCIAL STABILITY: SOCIAL INSECURITY: ABUSE: ADULT

## 2024-11-07 SDOH — SOCIAL STABILITY: SOCIAL INSECURITY: ARE YOU OR HAVE YOU BEEN THREATENED OR ABUSED PHYSICALLY, EMOTIONALLY, OR SEXUALLY BY ANYONE?: UNABLE TO ASSESS

## 2024-11-07 SDOH — SOCIAL STABILITY: SOCIAL INSECURITY: DO YOU FEEL UNSAFE GOING BACK TO THE PLACE WHERE YOU ARE LIVING?: UNABLE TO ASSESS

## 2024-11-07 SDOH — SOCIAL STABILITY: SOCIAL INSECURITY: HAS ANYONE EVER THREATENED TO HURT YOUR FAMILY OR YOUR PETS?: UNABLE TO ASSESS

## 2024-11-07 SDOH — SOCIAL STABILITY: SOCIAL INSECURITY: WERE YOU ABLE TO COMPLETE ALL THE BEHAVIORAL HEALTH SCREENINGS?: NO

## 2024-11-07 SDOH — SOCIAL STABILITY: SOCIAL INSECURITY: HAVE YOU HAD ANY THOUGHTS OF HARMING ANYONE ELSE?: UNABLE TO ASSESS

## 2024-11-07 SDOH — SOCIAL STABILITY: SOCIAL INSECURITY: DOES ANYONE TRY TO KEEP YOU FROM HAVING/CONTACTING OTHER FRIENDS OR DOING THINGS OUTSIDE YOUR HOME?: UNABLE TO ASSESS

## 2024-11-07 ASSESSMENT — COGNITIVE AND FUNCTIONAL STATUS - GENERAL
CLIMB 3 TO 5 STEPS WITH RAILING: A LOT
EATING MEALS: A LOT
DRESSING REGULAR LOWER BODY CLOTHING: A LOT
MOBILITY SCORE: 12
STANDING UP FROM CHAIR USING ARMS: TOTAL
MOVING FROM LYING ON BACK TO SITTING ON SIDE OF FLAT BED WITH BEDRAILS: A LOT
DRESSING REGULAR UPPER BODY CLOTHING: TOTAL
HELP NEEDED FOR BATHING: A LOT
DAILY ACTIVITIY SCORE: 6
DRESSING REGULAR UPPER BODY CLOTHING: A LOT
TOILETING: TOTAL
WALKING IN HOSPITAL ROOM: A LOT
DRESSING REGULAR LOWER BODY CLOTHING: TOTAL
MOBILITY SCORE: 6
WALKING IN HOSPITAL ROOM: TOTAL
MOVING FROM LYING ON BACK TO SITTING ON SIDE OF FLAT BED WITH BEDRAILS: TOTAL
MOVING TO AND FROM BED TO CHAIR: TOTAL
EATING MEALS: TOTAL
PERSONAL GROOMING: TOTAL
TURNING FROM BACK TO SIDE WHILE IN FLAT BAD: A LOT
TOILETING: A LOT
CLIMB 3 TO 5 STEPS WITH RAILING: TOTAL
DAILY ACTIVITIY SCORE: 12
STANDING UP FROM CHAIR USING ARMS: A LOT
TURNING FROM BACK TO SIDE WHILE IN FLAT BAD: TOTAL
PATIENT BASELINE BEDBOUND: YES
HELP NEEDED FOR BATHING: TOTAL
PERSONAL GROOMING: A LOT
MOVING TO AND FROM BED TO CHAIR: A LOT

## 2024-11-07 ASSESSMENT — ACTIVITIES OF DAILY LIVING (ADL)
HEARING - RIGHT EAR: FUNCTIONAL
BATHING: DEPENDENT
HEARING - RIGHT EAR: FUNCTIONAL
PATIENT'S MEMORY ADEQUATE TO SAFELY COMPLETE DAILY ACTIVITIES?: NO
BATHING: DEPENDENT
FEEDING YOURSELF: DEPENDENT
LACK_OF_TRANSPORTATION: PATIENT UNABLE TO ANSWER
DRESSING YOURSELF: DEPENDENT
GROOMING: DEPENDENT
FEEDING YOURSELF: DEPENDENT
PATIENT'S MEMORY ADEQUATE TO SAFELY COMPLETE DAILY ACTIVITIES?: YES
HEARING - LEFT EAR: FUNCTIONAL
TOILETING: DEPENDENT
TOILETING: DEPENDENT
GROOMING: DEPENDENT
DRESSING YOURSELF: DEPENDENT
HEARING - LEFT EAR: FUNCTIONAL
JUDGMENT_ADEQUATE_SAFELY_COMPLETE_DAILY_ACTIVITIES: YES
WALKS IN HOME: DEPENDENT
WALKS IN HOME: DEPENDENT
ADEQUATE_TO_COMPLETE_ADL: YES
JUDGMENT_ADEQUATE_SAFELY_COMPLETE_DAILY_ACTIVITIES: NO

## 2024-11-07 ASSESSMENT — PATIENT HEALTH QUESTIONNAIRE - PHQ9
2. FEELING DOWN, DEPRESSED OR HOPELESS: NOT AT ALL
SUM OF ALL RESPONSES TO PHQ9 QUESTIONS 1 & 2: 0
1. LITTLE INTEREST OR PLEASURE IN DOING THINGS: NOT AT ALL

## 2024-11-07 ASSESSMENT — LIFESTYLE VARIABLES
AUDIT-C TOTAL SCORE: -1
SKIP TO QUESTIONS 9-10: 0
HOW OFTEN DO YOU HAVE 6 OR MORE DRINKS ON ONE OCCASION: PATIENT UNABLE TO ANSWER
AUDIT-C TOTAL SCORE: -1
HOW OFTEN DO YOU HAVE A DRINK CONTAINING ALCOHOL: PATIENT UNABLE TO ANSWER
HOW MANY STANDARD DRINKS CONTAINING ALCOHOL DO YOU HAVE ON A TYPICAL DAY: PATIENT UNABLE TO ANSWER

## 2024-11-07 ASSESSMENT — PAIN SCALES - WONG BAKER: WONGBAKER_NUMERICALRESPONSE: NO HURT

## 2024-11-07 ASSESSMENT — PAIN SCALES - GENERAL: PAINLEVEL_OUTOF10: 0 - NO PAIN

## 2024-11-07 ASSESSMENT — PAIN - FUNCTIONAL ASSESSMENT: PAIN_FUNCTIONAL_ASSESSMENT: 0-10

## 2024-11-07 NOTE — PROGRESS NOTES
Clayton Parra is a 59 y.o. male on hospital day 1 of admission presenting with Altered mental status, unspecified altered mental status type.    Subjective   Patient seen at bedside. Non-responsive to questions.    Additional Information: Admitted overnight for concerns of AMS, currently being treated for meningitis due to neck stiffness and vitals on admission. Medications held due to inability to take PO, concern for NMS, and clozapine needing re-titration.          Objective     Vital Signs:      11/6/2024    12:15 PM 11/6/2024     1:20 PM 11/6/2024     3:07 PM 11/6/2024     4:04 PM 11/6/2024     5:53 PM 11/6/2024    11:00 PM 11/7/2024     7:49 AM   Vitals   Systolic 162 220 145 110 128 132 161   Diastolic 78 105 60 44 77 64 85   Heart Rate 102 106 86 87 92 78 92   Temp 37 °C (98.6 °F)    36.7 °C (98.1 °F) 36.6 °C (97.9 °F) 36.3 °C (97.3 °F)   Resp 18 18 18 18 20 18 16      Intake/Output last 3 Shifts:  I/O last 3 completed shifts:  In: 1050 (10.5 mL/kg) [IV Piggyback:1050]  Out: 825 (8.3 mL/kg) [Urine:825 (0.2 mL/kg/hr)]  Weight: 99.8 kg     Mental Status Exam:  General/Appearance: Acute Psychological Distress, appears older than stated age, in hospital gown, grooming/hygiene is reasonable for setting. Generalized fine, constant tremors.  Attitude/Behavior: does not engage in interview, fleeting eye contact  Motor Activity: tics/tremors - generalized and fine  Mood: unable to assess  Speech: verbigeration, mutters incomprehensible sounds  Thought Process: unable to assess  Thought Content: unable to assess, Delusional thinking: unable to assess  Thought Perception: unable to assess  Cognition: unable to assess      Current Medications  Scheduled   aspirin, 81 mg, oral, Daily  atorvastatin, 40 mg, oral, Nightly  [Held by provider] benztropine, 0.5 mg, oral, BID  [Held by provider] carBAMazepine XR, 400 mg, oral, Nightly  [Held by provider] cloZAPine, 500 mg, oral, Nightly  enoxaparin, 40 mg, subcutaneous,  q24h  ferrous sulfate (325 mg ferrous sulfate), 65 mg of iron, oral, Daily with breakfast  insulin glargine, 10 Units, subcutaneous, Nightly  insulin lispro, 0-10 Units, subcutaneous, q6h  insulin lispro, 18 Units, subcutaneous, TID AC  lisinopril, 20 mg, oral, Daily  LORazepam, 1 mg, intravenous, Once  [Held by provider] metFORMIN, 1,000 mg, oral, BID  [Held by provider] propranolol, 10 mg, oral, TID  sennosides-docusate sodium, 2 tablet, oral, BID  [Held by provider] thiamine, 100 mg, oral, Daily  thiamine, 500 mg, intravenous, Daily      Continuous   sodium chloride 0.9%, 100 mL/hr, Last Rate: 100 mL/hr (11/06/24 1917)      PRN   PRN medications: dextrose, dextrose, dextrose, dextrose, glucagon, glucagon, glucagon, glucagon, LORazepam, [Held by provider] OLANZapine **OR** [Held by provider] OLANZapine     Labs  Results for orders placed or performed during the hospital encounter of 11/06/24 (from the past 24 hours)   Legionella Antigen, Urine    Specimen: Clean Catch/Voided; Urine   Result Value Ref Range    L. pneumophila Urine Ag Negative Negative   Streptococcus pneumoniae Antigen, Urine    Specimen: Clean Catch/Voided; Urine   Result Value Ref Range    Streptococcus pneumoniae Ag, Urine Negative Negative   DRUG SCREEN,URINE   Result Value Ref Range    Amphetamine Screen, Urine Presumptive Negative Presumptive Negative    Barbiturate Screen, Urine Presumptive Negative Presumptive Negative    Benzodiazepines Screen, Urine Presumptive Negative Presumptive Negative    Cannabinoid Screen, Urine Presumptive Negative Presumptive Negative    Cocaine Metabolite Screen, Urine Presumptive Negative Presumptive Negative    Fentanyl Screen, Urine Presumptive Negative Presumptive Negative    Opiate Screen, Urine Presumptive Negative Presumptive Negative    Oxycodone Screen, Urine Presumptive Negative Presumptive Negative    PCP Screen, Urine Presumptive Negative Presumptive Negative    Methadone Screen, Urine Presumptive  Negative Presumptive Negative   Urine Tube   Result Value Ref Range    Extra Tube Hold for add-ons.    POCT GLUCOSE   Result Value Ref Range    POCT Glucose 213 (H) 74 - 99 mg/dL   POCT GLUCOSE   Result Value Ref Range    POCT Glucose 234 (H) 74 - 99 mg/dL   POCT GLUCOSE   Result Value Ref Range    POCT Glucose 262 (H) 74 - 99 mg/dL   POCT GLUCOSE   Result Value Ref Range    POCT Glucose 238 (H) 74 - 99 mg/dL   POCT GLUCOSE   Result Value Ref Range    POCT Glucose 216 (H) 74 - 99 mg/dL   Protime-INR   Result Value Ref Range    Protime 12.8 9.8 - 12.8 seconds    INR 1.1 0.9 - 1.1        Imaging  ECG 12 lead    Result Date: 11/7/2024  Sinus tachycardia Possible Left atrial enlargement Borderline ECG When compared with ECG of 04-NOV-2024 22:44, Nonspecific T wave abnormality, improved in Inferior leads Nonspecific T wave abnormality no longer evident in Lateral leads See ED provider note for full interpretation and clinical correlation Confirmed by Lauren Bennett ([id number]) on 11/7/2024 1:06:25 AM    CT chest abdomen pelvis w IV contrast    Result Date: 11/6/2024  Interpreted By:  Dae Faria and Gupta Jayesh STUDY: CT CHEST ABDOMEN PELVIS W IV CONTRAST;  11/6/2024 8:55 am   INDICATION: Signs/Symptoms:sepsis ams.   COMPARISON: None.   ACCESSION NUMBER(S): [id number]   ORDERING CLINICIAN: LUCHO CHANEY   TECHNIQUE: CT of the chest, abdomen, and pelvis was performed.  Contiguous axial images were obtained at 3 mm slice thickness through the chest, abdomen and pelvis. Coronal and sagittal reconstructions at 3 mm slice thickness were performed. 110 ML of Omnipaque 350 was administered intravenously without immediate complication.   FINDINGS: CHEST:   LUNG/PLEURA/LARGE AIRWAYS: No lung masses, pulmonary nodules or consolidations are present. There is no pleural effusion or pneumothorax.   VESSELS: Aorta and pulmonary arteries are normal caliber.  No atherosclerotic changes of the aorta are identified.  No coronary  artery calcifications are present.   HEART: Heart size is within normal limits. No pericardial effusion   MEDIASTINUM AND MERLYN: No mediastinal, hilar or axillary lymphadenopathy is present. Esophagus: Within normal limits.   CHEST WALL AND LOWER NECK: The soft tissues of the chest wall demonstrate no gross abnormality. The visualized thyroid gland appears within normal limits.   ABDOMEN:   LIVER: The liver size is within normal limits and demonstrates diffusely decreased attenuation suggesting steatosis.   BILE DUCTS: The intrahepatic and extrahepatic ducts are not dilated.   GALLBLADDER: No calcified stones. No wall thickening.   PANCREAS: The pancreas appears unremarkable without evidence of ductal dilatation or masses.   SPLEEN: Within normal limits.   ADRENAL GLANDS: Within normal limits.   KIDNEYS AND URETERS: The kidneys enhance symmetrically.  No hydroureteronephrosis or nephroureterolithiasis is identified. Small subcentimeter hypodense well-circumscribed lesion within the left kidney, too small to characterize, likely favoring a simple renal cyst (Series 201, Image 113) .   PELVIS:   BLADDER: No abnormal wall thickening.   REPRODUCTIVE ORGANS: The prostate is not enlarged.   BOWEL: The stomach is unremarkable.  The small and large bowel are normal in caliber and demonstrate no wall thickening.  Unremarkable appendix.   VESSELS: There is no aneurysmal dilatation of the abdominal aorta. The IVC appears within normal limits.   PERITONEUM/RETROPERITONEUM/LYMPH NODES: No ascites or free air, no fluid collection.  No abdominopelvic lymphadenopathy is present.   BONE AND SOFT TISSUE: No suspicious osseous lesions are identified. Degenerative discogenic disease is noted in the lower thoracic and lumbar spine.  Small umbilical hernia (Series 201, Image 157) .       No etiology for sepsis or altered mental status is evident.   I personally reviewed the images/study and I agree with the findings as stated by Carter  MD Alyssia. This study was interpreted at [facility], [place], [place].   MACRO: None   Signed by: Dae Giana 11/6/2024 11:39 AM Dictation workstation:   [id number]    XR chest 1 view    Result Date: 11/6/2024  Interpreted By:  Osiel Chaney and Ritchie Brandon STUDY: XR CHEST 1 VIEW;  11/6/2024 6:04 am   INDICATION: Signs/Symptoms:shortness of breath.   COMPARISON: Chest x-ray 10/29/2024.   ACCESSION NUMBER(S): [id number]   ORDERING CLINICIAN: TIFFANY MARTINEZ   FINDINGS: AP radiograph of the chest was provided.   Lordotic radiograph limiting direct comparison to the prior examination/and limiting assessment of the lower lung fields.   CARDIOMEDIASTINAL SILHOUETTE: Cardiomediastinal silhouette is stable in size and configuration.   LUNGS: No focal consolidation, pleural effusion, or pneumothorax.   ABDOMEN: No remarkable upper abdominal findings.   BONES: No acute osseous changes.       1.  No evidence of acute cardiopulmonary process within limitations of lordotic radiograph.   I personally reviewed the images/study and I agree with the findings as stated by resident Jese Page. This study was interpreted at [facility], [place], [place].   MACRO: None   Signed by: Osiel Chaney 11/6/2024 9:17 AM Dictation workstation:   [id number]    CT head wo IV contrast    Result Date: 11/6/2024  Interpreted By:  Royer Reyes and Ritchie Brandon STUDY: CT HEAD WO IV CONTRAST;  11/6/2024 6:03 am   INDICATION: Signs/Symptoms:AMS   COMPARISON: CT head 10/29/2024.   ACCESSION NUMBER(S): [id number]   ORDERING CLINICIAN: TIFFANY MARTINEZ   TECHNIQUE: Axial noncontrast CT images of head with coronal and sagittal reconstructed images.   FINDINGS: CT HEAD:   BRAIN PARENCHYMA:  No acute intraparenchymal hemorrhage or parenchymal evidence of acute large territory ischemic infarct. No mass-effect. Gray-white matter distinction is preserved. Mild  chronic small vessel ischemic change suggested.   VENTRICLES and EXTRA-AXIAL SPACES:  No acute extra-axial or intraventricular hemorrhage. No effacement of cerebral sulci. Ventricles and sulci are age-concordant with unchanged atrophy.   PARANASAL SINUSES/MASTOIDS:  No hemorrhage or air-fluid levels within the visualized paranasal sinuses. Small retention cyst in the left sphenoid sinus. The mastoids are well aerated.   CALVARIUM/ORBITS:  No skull fracture. Unchanged small remote inferior right orbital wall fracture. Otherwise the orbits and globes are unremarkable.   EXTRACRANIAL SOFT TISSUES: No discernible hematoma.       CT HEAD: 1. Stable exam. No evidence of acute intracranial process or large territory ischemic infarct.   I personally reviewed the images/study and I agree with the findings as stated by resident Jese Page. This study was interpreted at [place], [place].   MACRO: None.     Signed by: Royer Reyes 11/6/2024 6:43 AM Dictation workstation:   [id number]      Psychiatric Risk Assessment  Acute Risk of Harm to Others is Considered: Chronically greater than the general population  Acute Risk of Harm to Self is Considered: Chronically greater than the general population    Assessment:  59M history of schizophrenia (bipolar?) and diabetes, who returned to ED from inpatient psych for medical exam due to worsening alterations in mentation, refusal to take PO, and unstable vital signs. Psychiatry was consulted on 11/6 for psychiatric evaluation. His outpatient medication regimen appears to be benztropine 0.5 mg BID, carbamazepine 400 mg nightly, clozapine 500 mg nightly, propranolol 10 mg TID.     On initial assessment, patient is unable to participate in examination due to altered mentation. He is diffusely rigid and tremulous. He is also approaching 48 hour devon since last clozapine dose. At this juncture, will need robust workup to rule out  infectious/metabolic causes of altered mentation, rigidity, vital instability. Broad differential would consider infectious causes, metabolic derangements, NMS, encephalitis, meningitis, catatonia, clozapine withdrawal. At present would avoid antipsychotic administration until NMS is ruled out, may be more beneficial to utilize benzodiazepines while monitoring for vital depression.     11/7: He continues to remain unresponsive with generalized rigidity and fine tremors with repetition of unclear sounds. NMS vs malignant catatonia with possible contributing clozapine withdrawal. Walters Michael catatonia rating scale score initially 32, with improvement to 23 after an ativan challenge with 1 mg IV. However, difficult to rule out NMS or clozapine withdrawal given unknown previous baseline, control of schizophrenia, and exact time of last clozapine dose.    Impression:  Catatonia   Schizophrenia  R/o NMS, clozapine withdrawal    Recommendations:  Safety/Monitoring:  Patient meets criteria for inpatient psychiatric admission  Patient does require 1:1 observation, despite enhanced safety features  Daily interdisciplinary safety and planning huddle with Psychiatry  Video monitoring as with all patients on the MPU  Psychiatry will follow patient daily while on the MPU    Medications:  - Hold clozapine, will need to restart titration when stable  - hold cogentin   - continue home carbamazepine (IV/IM if unable to tolerate PO)  - add lorazepam 1 mg q6h, will re-assess Walters-Michael scale in the AM  - add depakote 250 mg PO TID PRN for agitation, can do IV unable to tolerate PO    Considerations:  Therapies recommended:  will continue to assess for benefit and meaningful participation    Delirium Guidelines  Provide glasses, hearing aids and/or communication boards as needed for impairments  Frequent reorientation, minimize room and staff changes  Open blinds during the day, dark/quiet room at night   Minimal interruptions and  daytime naps  Early evaluation and intervention by PT, out of bed as tolerated  Minimize use of restraints   Minimize use of benzodiazepines, anticholinergic medications, and opiates (while ensuring adequate treatment of pain)  Keep Mg>2, K>4 (as able)  Ensure regular bowel and bladder function (as able)    Disposition/Discharge Planning:  SW following, appreciate assistance      Multidisciplinary Rounding  Attending Physician, Medical Student, Nurse, Pharmacist, and Social Work    Medication Consent  N/A - Consult Service    Patient seen and discussed with attending, Dr Lizet Evans.  Naveen Gee MD  Family Medicine Resident.

## 2024-11-07 NOTE — PROGRESS NOTES
INTERNAL MEDICINE PROGRESS NOTE     BRIEF NARRATIVE      Clayton Parra is a 59 y.o. male on day 1 of admission presenting with Altered mental status, unspecified altered mental status type.    Pt with significant psychiatric history including schizophrenia/schizoaffective/bipolar with apparent home medications (carbamazepine 400 mg qhs, clozapine 500 mg qhs, benztropine 0.5 mg BID, propanolol 10mg TID), T2DM  HLD, anemia, HTN, who originally presented to the ED a couple days ago from [facility] for refusing his psych medications.  Was deemed to not have capacity and sent to [facility] for psych admission.  Upon arrival at [facility] patient was found to be alert but nonverbal and not oriented to self place or time.  On arrival to the ED he was found to be diaphoretic rigid.  At the time of this exam he was again diaphoretic and rigid nonverbal his eyes were open but he did not respond to questions and he responded very lightly to noxious stimuli.     While in the ED his vitals were stable.  They attempted an LP unsuccessfully.  His labs were WNL.  CT C/A/P and CT of the head were negative for any acute issues CXR was also negative.  Patient to be admitted for encephalopathy      ASSESSMENT / PLANS      > Catatonia   > Possible NMS  > Possible antipsychotic withdrawal v intoxication   > Acute encephalopathy, multifactorial, psychosis v toxic v infectious   > H/o Schizophrenia   Etiology of a catatonia is most likely secondary to antipsychotic. However differential includes possible NMS v serotonin syndrome ( tachycardia, hypertension, rigidity, mixed clonus and hyperreflexia), meningitis/ encephalitis less likely (  negative white count, bcx neg, nuchal rigidity noted but  Kernig and Brudzinski signs negative)  - S/p Acyclovir, ampicillin, ceftriaxone and vancomycin in the ED in suspicions of possible meningitis. Bcx, WBC negative, afebrile. No infectious source identified so far, Will hold on abx for now. Follow procal and possible LP   - Psych following   - Pt currently meet inpatient psych admission   - Per psych recs, will start IV lorazepam 1mg QID, Depacon 250mg TID prn for agitation   - Restart carbamazepine  - Hold  clozapine   - Hold benztropine  - Routine EEG pending, might consider continuous EEG if tremor persists 24h after initiation of current tx   - Will obtain brain MRI to r/o early anoxic injury as possible etiology   - Patient is on thiamine 100mg daily at home for vit deficiency and Wernicke ppx, will load with IV thiamine for 3 days    - Cont fall precaution   - Cont 1:1 sitter     > T2DM  - Hold home metformin  - Home insulin regimen: Lantus 70 units nightly, Humalog 18 units 3 times daily with meals  - BG fluctuating between 127- 262. For now will continue Lantus 10u nightly , Lispro 18u TID with meals and #1 SSI. Goal -180   - Cont accucheck      > HTN  > HLD  -Will hold home aspirin and atorvastatin and lisinopril for now until patient is able to take p.o.  - Cont IV lopressor 5mg for SBP > 160  DBP > 110      > Anemia  -Hgb stable  -Will hold home iron     Fluids: monitor and replete as needed  Electrolytes: monitor and replete as needed  Nutrition: Regular diet   GI prophylaxis: as needed  DVT prophylaxis: SCD  Code Status: full code    SUBJECTIVE       The patient seen and examined today, he was laying in bed in nonapparent distress.  The patient is obtunded, was not responding to verbal stimuli, however patient did not respond to noxious stimulus  OBJECTIVE      Visit Vitals  /85   Pulse 92   Temp 36.3 °C (97.3 °F)   Resp 16        Intake/Output Summary (Last 24 hours) at 11/7/2024  0843  Last data filed at 11/7/2024 0500  Gross per 24 hour   Intake --   Output 825 ml   Net -825 ml       Physical Exam  Constitutional:       Appearance: He is ill-appearing and toxic-appearing.   HENT:      Head: Normocephalic and atraumatic.      Right Ear: Tympanic membrane normal.      Left Ear: Tympanic membrane normal.   Eyes:      Pupils: Pupils are equal, round, and reactive to light.   Cardiovascular:      Rate and Rhythm: Tachycardia present.   Abdominal:      Palpations: Abdomen is soft.   Musculoskeletal:         General: Normal range of motion.      Cervical back: Rigidity present.   Skin:     Capillary Refill: Capillary refill takes less than 2 seconds.   Neurological:      Mental Status: He is disoriented.        Current Meds   aspirin, 81 mg, oral, Daily  atorvastatin, 40 mg, oral, Nightly  benztropine, 0.5 mg, oral, BID  carBAMazepine XR, 400 mg, oral, Nightly  [Held by provider] cloZAPine, 500 mg, oral, Nightly  enoxaparin, 40 mg, subcutaneous, q24h  ferrous sulfate (325 mg ferrous sulfate), 65 mg of iron, oral, Daily with breakfast  insulin glargine, 10 Units, subcutaneous, Nightly  insulin lispro, 0-10 Units, subcutaneous, q6h  insulin lispro, 18 Units, subcutaneous, TID AC  lisinopril, 20 mg, oral, Daily  metFORMIN, 1,000 mg, oral, BID  propranolol, 10 mg, oral, TID  sennosides-docusate sodium, 2 tablet, oral, BID  thiamine, 100 mg, oral, Daily       PRN medications: dextrose, dextrose, dextrose, dextrose, glucagon, glucagon, glucagon, glucagon, OLANZapine **OR** OLANZapine     LABS and IMAGING     WBC   Date Value Ref Range Status   11/06/2024 10.2 4.4 - 11.3 x10*3/uL Final   11/04/2024 7.8 4.4 - 11.3 x10*3/uL Final     Hemoglobin   Date Value Ref Range Status   11/06/2024 14.6 13.5 - 17.5 g/dL Final   11/04/2024 13.7 13.5 - 17.5 g/dL Final     Hematocrit   Date Value Ref Range Status   11/06/2024 43.2 41.0 - 52.0 % Final   11/04/2024 39.3 (L) 41.0 - 52.0 % Final     Bicarbonate   Date Value  Ref Range Status   11/06/2024 21 21 - 32 mmol/L Final   11/04/2024 28 21 - 32 mmol/L Final     Creatinine   Date Value Ref Range Status   11/06/2024 1.07 0.50 - 1.30 mg/dL Final   11/04/2024 0.90 0.50 - 1.30 mg/dL Final     Calcium   Date Value Ref Range Status   11/06/2024 8.9 8.6 - 10.6 mg/dL Final   11/04/2024 9.5 8.6 - 10.6 mg/dL Final       ECG 12 lead  Sinus tachycardia  Possible Left atrial enlargement  Borderline ECG  When compared with ECG of 04-NOV-2024 22:44,  Nonspecific T wave abnormality, improved in Inferior leads  Nonspecific T wave abnormality no longer evident in Lateral leads  See ED provider note for full interpretation and clinical correlation  Confirmed by Lauren Bennett ([id number]) on 11/7/2024 1:06:25 AM         PROBLEM LISTS      Problem List Items Addressed This Visit          Neuro    * (Principal) Altered mental status, unspecified altered mental status type - Primary       This dictation was created with voice recognition software. Although every effort is made to review the dictation as it is transcribed, on occasion spoken words, phrases, names, numbers, punctuation etc can be misinterpreted by the the technology leading to omissions or inappropriate outcomes.     Geraldine Rojas MD

## 2024-11-07 NOTE — CARE PLAN
RN took over care at 2300. Patient was confused and unable to answer any questions. Patient was able to rest throughout the night. No complains of pain.  Patient has sitter at bedside for safety.     Laura PALACIOS      Problem: Fall/Injury  Goal: Not fall by end of shift  Outcome: Progressing     Problem: Skin  Goal: Participates in plan/prevention/treatment measures  Outcome: Progressing     Problem: Fall/Injury  Goal: Be free from injury by end of the shift  Outcome: Progressing     Problem: Skin  Goal: Promote skin healing  Outcome: Progressing

## 2024-11-08 ENCOUNTER — APPOINTMENT (OUTPATIENT)
Dept: RADIOLOGY | Facility: HOSPITAL | Age: 59
DRG: 885 | End: 2024-11-08
Payer: COMMERCIAL

## 2024-11-08 ENCOUNTER — APPOINTMENT (OUTPATIENT)
Dept: CARDIOLOGY | Facility: HOSPITAL | Age: 59
DRG: 885 | End: 2024-11-08
Payer: COMMERCIAL

## 2024-11-08 LAB
ALBUMIN SERPL BCP-MCNC: 4 G/DL (ref 3.4–5)
ANION GAP SERPL CALC-SCNC: 17 MMOL/L (ref 10–20)
APPEARANCE CSF: ABNORMAL
APPEARANCE CSF: ABNORMAL
BASOPHILS NFR CSF MANUAL: 0 %
BASOPHILS NFR CSF MANUAL: 0 %
BLASTS CSF MANUAL: 0 %
BLASTS CSF MANUAL: 0 %
BUN SERPL-MCNC: 15 MG/DL (ref 6–23)
CALCIUM SERPL-MCNC: 8.8 MG/DL (ref 8.6–10.6)
CHLORIDE SERPL-SCNC: 115 MMOL/L (ref 98–107)
CO2 SERPL-SCNC: 20 MMOL/L (ref 21–32)
COLOR CSF: ABNORMAL
COLOR CSF: COLORLESS
COLOR SPUN CSF: ABNORMAL
COLOR SPUN CSF: COLORLESS
CREAT SERPL-MCNC: 0.92 MG/DL (ref 0.5–1.3)
EGFRCR SERPLBLD CKD-EPI 2021: >90 ML/MIN/1.73M*2
EOSINOPHIL NFR CSF MANUAL: 1 %
EOSINOPHIL NFR CSF MANUAL: 5 %
ERYTHROCYTE [DISTWIDTH] IN BLOOD BY AUTOMATED COUNT: 13.6 % (ref 11.5–14.5)
FOLATE SERPL-MCNC: >24 NG/ML
GLUCOSE BLD MANUAL STRIP-MCNC: 155 MG/DL (ref 74–99)
GLUCOSE BLD MANUAL STRIP-MCNC: 156 MG/DL (ref 74–99)
GLUCOSE BLD MANUAL STRIP-MCNC: 181 MG/DL (ref 74–99)
GLUCOSE BLD MANUAL STRIP-MCNC: 298 MG/DL (ref 74–99)
GLUCOSE BLD MANUAL STRIP-MCNC: 335 MG/DL (ref 74–99)
GLUCOSE CSF-MCNC: 151 MG/DL (ref 40–70)
GLUCOSE SERPL-MCNC: 174 MG/DL (ref 74–99)
HCT VFR BLD AUTO: 39.3 % (ref 41–52)
HGB BLD-MCNC: 12.5 G/DL (ref 13.5–17.5)
IMM GRANULOCYTES NFR CSF: 0 %
IMM GRANULOCYTES NFR CSF: 0 %
LYMPHOCYTES NFR CSF MANUAL: 37 % (ref 28–96)
LYMPHOCYTES NFR CSF MANUAL: 37 % (ref 28–96)
MCH RBC QN AUTO: 31.7 PG (ref 26–34)
MCHC RBC AUTO-ENTMCNC: 31.8 G/DL (ref 32–36)
MCV RBC AUTO: 100 FL (ref 80–100)
MONOS+MACROS NFR CSF MANUAL: 14 % (ref 16–56)
MONOS+MACROS NFR CSF MANUAL: 16 % (ref 16–56)
NEUTS SEG NFR CSF MANUAL: 42 % (ref 0–5)
NEUTS SEG NFR CSF MANUAL: 48 % (ref 0–5)
NRBC BLD-RTO: 0 /100 WBCS (ref 0–0)
OTHER CELLS NFR CSF MANUAL: 0 %
OTHER CELLS NFR CSF MANUAL: 0 %
PHOSPHATE SERPL-MCNC: 3 MG/DL (ref 2.5–4.9)
PLASMA CELLS NFR CSF MICRO: 0 %
PLASMA CELLS NFR CSF MICRO: 0 %
PLATELET # BLD AUTO: 144 X10*3/UL (ref 150–450)
POTASSIUM SERPL-SCNC: 4 MMOL/L (ref 3.5–5.3)
PROT CSF-MCNC: 83 MG/DL (ref 15–45)
RBC # BLD AUTO: 3.94 X10*6/UL (ref 4.5–5.9)
RBC # CSF AUTO: 1000 /UL (ref 0–5)
RBC # CSF AUTO: 3000 /UL (ref 0–5)
SODIUM SERPL-SCNC: 148 MMOL/L (ref 136–145)
TOTAL CELLS COUNTED CSF: 100
TOTAL CELLS COUNTED CSF: 100
TUBE # CSF: ABNORMAL
TUBE # CSF: ABNORMAL
WBC # BLD AUTO: 6.5 X10*3/UL (ref 4.4–11.3)
WBC # CSF AUTO: 4 /UL (ref 1–5)
WBC # CSF AUTO: 5 /UL (ref 1–5)

## 2024-11-08 PROCEDURE — 82947 ASSAY GLUCOSE BLOOD QUANT: CPT

## 2024-11-08 PROCEDURE — 85027 COMPLETE CBC AUTOMATED: CPT | Performed by: STUDENT IN AN ORGANIZED HEALTH CARE EDUCATION/TRAINING PROGRAM

## 2024-11-08 PROCEDURE — 009U3ZX DRAINAGE OF SPINAL CANAL, PERCUTANEOUS APPROACH, DIAGNOSTIC: ICD-10-PCS | Performed by: RADIOLOGY

## 2024-11-08 PROCEDURE — 99232 SBSQ HOSP IP/OBS MODERATE 35: CPT | Performed by: STUDENT IN AN ORGANIZED HEALTH CARE EDUCATION/TRAINING PROGRAM

## 2024-11-08 PROCEDURE — 84157 ASSAY OF PROTEIN OTHER: CPT | Performed by: NURSE PRACTITIONER

## 2024-11-08 PROCEDURE — 97530 THERAPEUTIC ACTIVITIES: CPT | Mod: GP

## 2024-11-08 PROCEDURE — 2500000002 HC RX 250 W HCPCS SELF ADMINISTERED DRUGS (ALT 637 FOR MEDICARE OP, ALT 636 FOR OP/ED): Performed by: STUDENT IN AN ORGANIZED HEALTH CARE EDUCATION/TRAINING PROGRAM

## 2024-11-08 PROCEDURE — S0136 CLOZAPINE, 25 MG: HCPCS | Performed by: STUDENT IN AN ORGANIZED HEALTH CARE EDUCATION/TRAINING PROGRAM

## 2024-11-08 PROCEDURE — 2500000001 HC RX 250 WO HCPCS SELF ADMINISTERED DRUGS (ALT 637 FOR MEDICARE OP): Performed by: STUDENT IN AN ORGANIZED HEALTH CARE EDUCATION/TRAINING PROGRAM

## 2024-11-08 PROCEDURE — 36415 COLL VENOUS BLD VENIPUNCTURE: CPT | Performed by: STUDENT IN AN ORGANIZED HEALTH CARE EDUCATION/TRAINING PROGRAM

## 2024-11-08 PROCEDURE — 80069 RENAL FUNCTION PANEL: CPT | Performed by: STUDENT IN AN ORGANIZED HEALTH CARE EDUCATION/TRAINING PROGRAM

## 2024-11-08 PROCEDURE — 62328 DX LMBR SPI PNXR W/FLUOR/CT: CPT

## 2024-11-08 PROCEDURE — 97161 PT EVAL LOW COMPLEX 20 MIN: CPT | Mod: GP

## 2024-11-08 PROCEDURE — 2500000004 HC RX 250 GENERAL PHARMACY W/ HCPCS (ALT 636 FOR OP/ED): Performed by: STUDENT IN AN ORGANIZED HEALTH CARE EDUCATION/TRAINING PROGRAM

## 2024-11-08 PROCEDURE — 87205 SMEAR GRAM STAIN: CPT | Performed by: NURSE PRACTITIONER

## 2024-11-08 PROCEDURE — 82945 GLUCOSE OTHER FLUID: CPT | Performed by: NURSE PRACTITIONER

## 2024-11-08 PROCEDURE — 99232 SBSQ HOSP IP/OBS MODERATE 35: CPT

## 2024-11-08 PROCEDURE — 89051 BODY FLUID CELL COUNT: CPT | Performed by: PHYSICIAN ASSISTANT

## 2024-11-08 PROCEDURE — 1200000002 HC GENERAL ROOM WITH TELEMETRY DAILY

## 2024-11-08 PROCEDURE — 2500000002 HC RX 250 W HCPCS SELF ADMINISTERED DRUGS (ALT 637 FOR MEDICARE OP, ALT 636 FOR OP/ED): Performed by: NURSE PRACTITIONER

## 2024-11-08 PROCEDURE — 93005 ELECTROCARDIOGRAM TRACING: CPT

## 2024-11-08 PROCEDURE — 88104 CYTOPATH FL NONGYN SMEARS: CPT | Performed by: PATHOLOGY

## 2024-11-08 RX ORDER — POLYETHYLENE GLYCOL 3350 17 G/17G
17 POWDER, FOR SOLUTION ORAL DAILY
Status: DISCONTINUED | OUTPATIENT
Start: 2024-11-08 | End: 2024-11-26 | Stop reason: HOSPADM

## 2024-11-08 RX ORDER — LORAZEPAM 2 MG/ML
1 INJECTION INTRAMUSCULAR EVERY 8 HOURS PRN
Status: DISCONTINUED | OUTPATIENT
Start: 2024-11-08 | End: 2024-11-26 | Stop reason: HOSPADM

## 2024-11-08 RX ORDER — LIDOCAINE HYDROCHLORIDE 10 MG/ML
5 INJECTION, SOLUTION INFILTRATION; PERINEURAL ONCE
Status: COMPLETED | OUTPATIENT
Start: 2024-11-08 | End: 2024-11-08

## 2024-11-08 RX ORDER — CLOZAPINE 25 MG/1
25 TABLET ORAL NIGHTLY
Status: DISCONTINUED | OUTPATIENT
Start: 2024-11-08 | End: 2024-11-09

## 2024-11-08 RX ORDER — DOCUSATE SODIUM 100 MG/1
100 CAPSULE, LIQUID FILLED ORAL 2 TIMES DAILY
Status: DISCONTINUED | OUTPATIENT
Start: 2024-11-08 | End: 2024-11-14

## 2024-11-08 RX ORDER — SODIUM CHLORIDE 9 MG/ML
50 INJECTION, SOLUTION INTRAVENOUS CONTINUOUS
Status: DISCONTINUED | OUTPATIENT
Start: 2024-11-08 | End: 2024-11-08

## 2024-11-08 ASSESSMENT — COGNITIVE AND FUNCTIONAL STATUS - GENERAL
DAILY ACTIVITIY SCORE: 17
WALKING IN HOSPITAL ROOM: TOTAL
MOVING FROM LYING ON BACK TO SITTING ON SIDE OF FLAT BED WITH BEDRAILS: A LITTLE
STANDING UP FROM CHAIR USING ARMS: A LITTLE
HELP NEEDED FOR BATHING: A LITTLE
MOVING TO AND FROM BED TO CHAIR: A LITTLE
WALKING IN HOSPITAL ROOM: A LITTLE
MOVING TO AND FROM BED TO CHAIR: A LITTLE
MOBILITY SCORE: 18
MOBILITY SCORE: 14
MOVING FROM LYING ON BACK TO SITTING ON SIDE OF FLAT BED WITH BEDRAILS: A LITTLE
EATING MEALS: A LITTLE
CLIMB 3 TO 5 STEPS WITH RAILING: A LITTLE
TURNING FROM BACK TO SIDE WHILE IN FLAT BAD: A LITTLE
DRESSING REGULAR LOWER BODY CLOTHING: A LITTLE
TOILETING: A LOT
DRESSING REGULAR UPPER BODY CLOTHING: A LITTLE
STANDING UP FROM CHAIR USING ARMS: A LITTLE
TURNING FROM BACK TO SIDE WHILE IN FLAT BAD: A LITTLE
CLIMB 3 TO 5 STEPS WITH RAILING: TOTAL
PERSONAL GROOMING: A LITTLE

## 2024-11-08 ASSESSMENT — PAIN SCALES - GENERAL
PAINLEVEL_OUTOF10: 0 - NO PAIN
PAINLEVEL_OUTOF10: 0 - NO PAIN

## 2024-11-08 ASSESSMENT — PAIN - FUNCTIONAL ASSESSMENT
PAIN_FUNCTIONAL_ASSESSMENT: WONG-BAKER FACES
PAIN_FUNCTIONAL_ASSESSMENT: WONG-BAKER FACES

## 2024-11-08 ASSESSMENT — ACTIVITIES OF DAILY LIVING (ADL): LACK_OF_TRANSPORTATION: NO

## 2024-11-08 ASSESSMENT — PAIN SCALES - WONG BAKER: WONGBAKER_NUMERICALRESPONSE: HURTS LITTLE BIT

## 2024-11-08 NOTE — POST-PROCEDURE NOTE
Interventional Radiology Post-Procedure Note    Attending: Maurisio Rodriguez MD    Assistant:   Endy Boss DO    Diagnosis:   1. Altered mental status, unspecified altered mental status type  EEG           Description of procedure:   The L3-L4 vertebral space marked under fluoroscopy. Patient was prepped and draped in the usual sterile fashion. 5 ml 1% lidocaine injected for local anesthesia. Under direct fluoroscopic guidance, a lumber puncture was performed at the L3-L4 interspace using a 20g spinal needle. A total of 10 ml of clear CSF was collected in 4 tubes. The collected CSF was then sent to the lab for appropriate lab tests as ordered by the referring physician. Hemostasis was achieved with direct pressure. The patient tolerated procedure well without any immediate or clinically apparent complications. See PACS for full procedural report.       Anesthesia:  Local    Complications: None    Estimated Blood Loss: minimal          See detailed result report with images in PACS.    The patient tolerated the procedure well without incident or complication and is in stable condition.     Endy Boss DO  Interventional Radiology  Nursing Quarterback: [id number], IR pager: [phone]     NON-Urgent on call weekends and after hours weekdays (5pm - 5am) IR pager: [phone]  Urgent & emergent on call weekends and after hours weekdays (5pm-7am) IR pager: [phone]

## 2024-11-08 NOTE — PROGRESS NOTES
"Clayton Parra is a 59 y.o. male on hospital day 2 of admission presenting with Altered mental status, unspecified altered mental status type.    Subjective   Patient seen at bedside. More responsive today. Asks for assistance with his breakfast. States he was able to sleep well last night, and feeling okay today. Denies AVH.     Objective     Vital Signs:      11/6/2024     3:07 PM 11/6/2024     4:04 PM 11/6/2024     5:53 PM 11/6/2024    11:00 PM 11/7/2024     7:49 AM 11/8/2024    12:11 AM 11/8/2024     9:22 AM   Vitals   Systolic 145 110 128 132 161 151 137   Diastolic 60 44 77 64 85 88 76   Heart Rate 86 87 92 78 92 88 90   Temp   36.7 °C (98.1 °F) 36.6 °C (97.9 °F) 36.3 °C (97.3 °F) 37.1 °C (98.8 °F) 37.2 °C (99 °F)   Resp 18 18 20 18 16 18 17      Intake/Output last 3 Shifts:  I/O last 3 completed shifts:  In: 0 (0 mL/kg)   Out: 1575 (15.8 mL/kg) [Urine:1575 (0.4 mL/kg/hr)]  Weight: 99.8 kg     Mental Status Exam:  General/Appearance: NAD, appears stated age, in hospital gown, body habitus: obese, grooming/hygiene is reasonable for setting  Attitude/Behavior: engages in interview, fleeting eye contact, calm  Motor Activity: no psychomotor agitation/retardation, tics/tremors - BUE resting, gait: not assessed  Mood: \"good\"  Affect: Quality-dysphoric, Intensity-sedated, Range-restricted to lower range  Speech: low rate, lacking tone, low volume, mumbled  Thought Process: linear  Thought Content: not assessed, Delusional thinking: none elicited  Thought Perception: denies AVH  Cognition: no overt deficits noted, not formally assessed  Insight: impaired  Judgment: poor      Current Medications  Scheduled   aspirin, 81 mg, oral, Daily  atorvastatin, 40 mg, oral, Nightly  [Held by provider] benztropine, 0.5 mg, oral, BID  carBAMazepine XR, 400 mg, oral, Nightly  enoxaparin, 40 mg, subcutaneous, q24h  ferrous sulfate (325 mg ferrous sulfate), 65 mg of iron, oral, Daily with breakfast  insulin glargine, 10 Units, " subcutaneous, Nightly  insulin lispro, 0-10 Units, subcutaneous, q6h  insulin lispro, 18 Units, subcutaneous, TID AC  lisinopril, 20 mg, oral, Daily  LORazepam, 1 mg, intravenous, q6h  [Held by provider] metFORMIN, 1,000 mg, oral, BID  [Held by provider] propranolol, 10 mg, oral, TID  sennosides-docusate sodium, 2 tablet, oral, BID  [Held by provider] thiamine, 100 mg, oral, Daily  thiamine, 500 mg, intravenous, Daily      Continuous        PRN   PRN medications: dextrose, dextrose, dextrose, dextrose, glucagon, glucagon, glucagon, glucagon, metoprolol, [Held by provider] OLANZapine **OR** [Held by provider] OLANZapine, valproate sodium     Labs  Results for orders placed or performed during the hospital encounter of 11/06/24 (from the past 24 hours)   POCT GLUCOSE   Result Value Ref Range    POCT Glucose 210 (H) 74 - 99 mg/dL   Procalcitonin   Result Value Ref Range    Procalcitonin 0.07 <=0.07 ng/mL   POCT GLUCOSE   Result Value Ref Range    POCT Glucose 101 (H) 74 - 99 mg/dL   POCT GLUCOSE   Result Value Ref Range    POCT Glucose 106 (H) 74 - 99 mg/dL   POCT GLUCOSE   Result Value Ref Range    POCT Glucose 156 (H) 74 - 99 mg/dL   POCT GLUCOSE   Result Value Ref Range    POCT Glucose 181 (H) 74 - 99 mg/dL   Renal Function Panel   Result Value Ref Range    Glucose 174 (H) 74 - 99 mg/dL    Sodium 148 (H) 136 - 145 mmol/L    Potassium 4.0 3.5 - 5.3 mmol/L    Chloride 115 (H) 98 - 107 mmol/L    Bicarbonate 20 (L) 21 - 32 mmol/L    Anion Gap 17 10 - 20 mmol/L    Urea Nitrogen 15 6 - 23 mg/dL    Creatinine 0.92 0.50 - 1.30 mg/dL    eGFR >90 >60 mL/min/1.73m*2    Calcium 8.8 8.6 - 10.6 mg/dL    Phosphorus 3.0 2.5 - 4.9 mg/dL    Albumin 4.0 3.4 - 5.0 g/dL   CBC   Result Value Ref Range    WBC 6.5 4.4 - 11.3 x10*3/uL    nRBC 0.0 0.0 - 0.0 /100 WBCs    RBC 3.94 (L) 4.50 - 5.90 x10*6/uL    Hemoglobin 12.5 (L) 13.5 - 17.5 g/dL    Hematocrit 39.3 (L) 41.0 - 52.0 %     80 - 100 fL    MCH 31.7 26.0 - 34.0 pg    MCHC 31.8  (L) 32.0 - 36.0 g/dL    RDW 13.6 11.5 - 14.5 %    Platelets 144 (L) 150 - 450 x10*3/uL        Imaging  EEG    Result Date: 11/7/2024  IMPRESSION This EEG is indicative of moderate diffuse encephalopathy. No epileptiform discharges or lateralizing signs are seen. No seizures were captured. Whole body tremors were recorded throughout the recording with no EEG correlate. A full report will be scanned into the patient's chart at a later time. This report has been interpreted and electronically signed by      Psychiatric Risk Assessment  Acute Risk of Harm to Others is Considered: Chronically greater than the general population  Acute Risk of Harm to Self is Considered: Chronically greater than the general population    Assessment:  59M history of schizophrenia (bipolar?) and diabetes, who returned to ED from inpatient psych for medical exam due to worsening alterations in mentation, refusal to take PO, and unstable vital signs. Psychiatry was consulted on 11/6 for psychiatric evaluation. His outpatient medication regimen appears to be benztropine 0.5 mg BID, carbamazepine 400 mg nightly, clozapine 500 mg nightly, propranolol 10 mg TID.     On initial assessment, patient is unable to participate in examination due to altered mentation. He is diffusely rigid and tremulous. He is also approaching 48 hour devon since last clozapine dose. At this juncture, will need robust workup to rule out infectious/metabolic causes of altered mentation, rigidity, vital instability. Broad differential would consider infectious causes, metabolic derangements, NMS, encephalitis, meningitis, catatonia, clozapine withdrawal. At present would avoid antipsychotic administration until NMS is ruled out, may be more beneficial to utilize benzodiazepines while monitoring for vital depression.     11/7: He continues to remain unresponsive with generalized rigidity and fine tremors with repetition of unclear sounds. NMS vs malignant catatonia with  possible contributing clozapine withdrawal. [facility] catatonia rating scale score initially 32, with improvement to 23 after an ativan challenge with 1 mg IV. However, difficult to rule out NMS or clozapine withdrawal given unknown previous baseline, control of schizophrenia, and exact time of last clozapine dose.    Impression:  Catatonia   Schizophrenia  R/o NMS, clozapine withdrawal    Recommendations:  Safety/Monitoring:  Patient meets criteria for inpatient psychiatric admission  Patient does require 1:1 observation, despite enhanced safety features  Daily interdisciplinary safety and planning huddle with Psychiatry  Video monitoring as with all patients on the MPU  Psychiatry will follow patient daily while on the MPU    Medications:  - Restart clozapine at 25 mg HS, can use ODT given issues with PO (must use clozapine order panel)    - hold cogentin   - continue home carbamazepine   - continue scheduled ativan 1 mg q6h   - stop depacon and add ativan 1mg PO/IV q6h PRN agitation     Considerations:  Therapies recommended:  will continue to assess for benefit and meaningful participation    Delirium Guidelines  Provide glasses, hearing aids and/or communication boards as needed for impairments  Frequent reorientation, minimize room and staff changes  Open blinds during the day, dark/quiet room at night   Minimal interruptions and daytime naps  Early evaluation and intervention by PT, out of bed as tolerated  Minimize use of restraints   Minimize use of benzodiazepines, anticholinergic medications, and opiates (while ensuring adequate treatment of pain)  Keep Mg>2, K>4 (as able)  Ensure regular bowel and bladder function (as able)    Disposition/Discharge Planning:  SW following, appreciate assistance      Multidisciplinary Rounding  Attending Physician, Medical Student, Nurse, Pharmacist, and Social Work    Medication Consent  N/A - Consult Service    Patient seen and discussed with attending, Dr Hinds  Cristina.  Naveen Gee MD  Family Medicine Resident.

## 2024-11-08 NOTE — CARE PLAN
The patient's goals for the shift include      The clinical goals for the shift include pt will remain safe entire shift    Over the shift, the patient did  make progress toward the following goals.

## 2024-11-08 NOTE — PROGRESS NOTES
"   11/08/24 1038   Discharge Planning   Living Arrangements Other (Comment)  (Pt is from [facility] x 5 years.)   Support Systems Family members   Assistance Needed Pt's guardian is requesting a new facility.   Type of Residence Nursing home/residential care   Do you have animals or pets at home? No   Who is requesting discharge planning? Other (Comment)  (guardian)   Home or Post Acute Services Post acute facilities (Rehab/SNF/etc)   Type of Post Acute Facility Services Long term care   Expected Discharge Disposition Inter   Does the patient need discharge transport arranged? Yes   RoundTrip coordination needed? Yes   Has discharge transport been arranged? No   What day is the transport expected? 11/12/24   Financial Resource Strain   How hard is it for you to pay for the very basics like food, housing, medical care, and heating? Not hard   Housing Stability   In the last 12 months, was there a time when you were not able to pay the mortgage or rent on time? N   At any time in the past 12 months, were you homeless or living in a shelter (including now)? N   Transportation Needs   In the past 12 months, has lack of transportation kept you from medical appointments or from getting medications? no   In the past 12 months, has lack of transportation kept you from meetings, work, or from getting things needed for daily living? No   Patient Choice   Patient / Family choosing to utilize agency / facility established prior to hospitalization No     Assessment Note:  Spoke with pt's guardian/sister Mehnaz Roach ([phone]) via phone, and introduced myself as care coordinator and member of the Care Transitions team for discharge planning.  Pt is from [facility].  Pt's guardian says she has been trying to work with the ICF for a new facility but has not been getting any help.  She is concerned pt may have been allowed to get into a \"bubblepack\", [facility] was notified, plan to send out [facility] ICF " referral.  Guardian states if pt has to return to facility then it would be ok as a last resort.  Guardian did not have any questions/concerns at this time.     Transitional Care Coordination Progress Note:  Patient was discussed during interdisciplinary rounds.  Team members present: medical team, SW and TCC.  Plan per medical team: Pt with catatonia, psychiatry is following for management.    [facility]  Status: Inpatient  Discharge disposition: Pt's guardian is requesting a new ICF, SW will plan to send mass referral.  Potential barriers: None  ADOD: 11/12  Care coordinator will continue to follow for discharge planning needs.     Patrizia Frazier MSN, RN-BC  Transitional Care Coordinator (TCC)  [phone]

## 2024-11-08 NOTE — PROGRESS NOTES
Clayton Parra is a 59 y.o. y.o. male on day 2 of admission presenting with Altered mental status, unspecified altered mental status type [R41.82].     [facility] and noted that patient has a legal guardian which is his sister, Mehnaz Parra. Guardianship paperwork uploaded into [employer]. Patient is LTC resident at [facility] but sister does not want him to return due to safety concerns. [place] ICF referral sent via [facility]; ADOD 11/12.    Patient may need inpatient psychiatry at discharge; requested PT/OT evaluations to determine patients mobility to determine appropriateness for inpatient psych.    TCC/SW will continue to follow.    - Halina QUIJANO, MA, LSW  Care Transitions   [facility] Secure Chat or [id number]

## 2024-11-08 NOTE — PROGRESS NOTES
Physical Therapy    Physical Therapy Evaluation & Treatment    Patient Name: Clayton Parra  MRN: [id number]  Department: [place] [address]  Room: [address] [place] [address]  Today's Date: 11/8/2024   Time Calculation  Start Time: 1525  Stop Time: 1555  Time Calculation (min): 30 min    Assessment/Plan   PT Assessment  PT Assessment Results: Decreased mobility, Decreased safety awareness, Decreased cognition  Rehab Prognosis: Good  Barriers to Discharge: medical comorbidities  End of Session Communication: Bedside nurse, PCT/NA/CTA  Assessment Comment: Pt mobilizing well, per nursing was able to walk to and from avery earlier this date with (S), completes activity this session with CGA-> (S).  Will benefit from continued skilled PT to progress towards baseline (I) mobility.  End of Session Patient Position: Bed, 3 rail up, Alarm on (nursing assistant in room as sitter went on break)   IP OR SWING BED PT PLAN  Inpatient or Swing Bed: Inpatient  PT Plan  Treatment/Interventions: Gait training, Transfer training, Balance training, Therapeutic exercise, Therapeutic activity  PT Plan: Ongoing PT  PT Frequency: 2 times per week  PT Discharge Recommendations:  (Pt may benefit from PT involvement at low intensity at LTC facility (24/7 (S) ) to maximize safe mobility due to recent immobility/catatonic state, currently needing (S) for safety/to direct mobility tasks safely.)  PT Recommended Transfer Status: Contact guard, Stand by assist  PT - OK to Discharge: Yes (POC/goals/discharge rec intensity created)      Subjective     General Visit Information:  General  Reason for Referral: encephalopathy, catatonia  Past Medical History Relevant to Rehab: DM, schizophrenia, anemia, HTN  Family/Caregiver Present: Yes  Caregiver Feedback: sitter present  Prior to Session Communication: Bedside nurse  General Comment: Pt seated at EOB upon PT entry, sometimes if difficult to understand speech/ is incoherent/intelligable.  Cooperative with mobility  assessment though does complain of back pain with standing (had lumbar puncture earlier this date.)  Will continue to follow.  Home Living:  Home Living  Type of Home:  (admitted from LTC facility)  Lives With:  (facility staff)  Prior Level of Function:  Prior Function Per Pt/Caregiver Report  ADL Assistance:  (anticipate may need (S) for safety at baseline)  Ambulatory Assistance:  (pt shakes head no to using walker or cane for ambulation, shakes head no for falls)  Precautions:  Precautions  Hearing/Visual Limitations: appears WFL  Medical Precautions: Fall precautions    Vital Signs (Past 2hrs)        Date/Time Vitals Session Patient Position Pulse Resp SpO2 BP MAP (mmHg)    11/08/24 15:40:12 --  --  --  --  --  149/82  104     11/08/24 15:42:21 --  --  --  --  --  163/77  106     11/08/24 15:44:22 --  --  --  --  --  157/83  108     11/08/24 15:46:24 --  --  85  17  94 %  130/91  104                   Vital Signs Comment: nursing assistant in during session needing to take orthostatic vitals, PT assisted pt with positioning during    Objective   Pain:  Pain Assessment  Pain Assessment: Sesay-Baker FACES  Sesay-Baker FACES Pain Rating: Hurts little bit  Pain Location: Back  Pain Interventions: Repositioned, Distraction  Response to Interventions: appears comfortable at end of session  Cognition:  Cognition  Arousal/Alertness: Delayed responses to stimuli  Orientation Level:  (knows he is the hospital to get better)  Following Commands: Follows one step commands with repetition  Cognition Comments: somewhat flat affect with minimal eye contact though does smile occasionally during session.  Does not always follow (R)/(L) directional cues    General Assessments:     Activity Tolerance  Endurance: Tolerates 10 - 20 min exercise with multiple rests    Sensation  Light Touch: No apparent deficits        Perception  Inattention/Neglect: Appears intact  Initiation: Cues to initiate tasks  Motor Planning: Appears  intact  Perseveration: Not present    Coordination  Movements are Fluid and Coordinated: Yes    Postural Control  Postural Control: Within Functional Limits    Static Sitting Balance  Static Sitting-Balance Support: Feet supported  Static Sitting-Level of Assistance: Distant supervision    Static Standing Balance  Static Standing-Balance Support: No upper extremity supported  Static Standing-Level of Assistance: Close supervision, Distant supervision  Dynamic Standing Balance  Dynamic Standing-Balance Support: Left upper extremity supported  Dynamic Standing-Level of Assistance: Contact guard, Close supervision  Functional Assessments:  Bed Mobility  Bed Mobility: Yes  Bed Mobility 1  Bed Mobility 1: Sitting to supine  Level of Assistance 1: Distant supervision    Transfers  Transfer: Yes  Transfer 1  Transfer From 1: Bed to  Transfer to 1: Stand  Transfer Level of Assistance 1: Hand held assistance, Contact guard, Close supervision, Moderate verbal cues  Trials/Comments 1: completed x2 with progression from CGA-> close supervision  Transfers 2  Transfer From 2: Stand to  Transfer to 2: Bed  Transfer Level of Assistance 2: Close supervision, Moderate verbal cues  Trials/Comments 2: completed x2,  hesitates mid sit with both trials needing reassurance to fully sit on bed    Ambulation/Gait Training  Ambulation/Gait Training Performed: Yes  Ambulation/Gait Training 1  Surface 1: Level tile  Device 1: No device  Assistance 1: Hand held assistance, Contact guard, Close supervision  Quality of Gait 1: Decreased step length, Inconsistent stride length, Wide base of support  Comments/Distance (ft) 1: 5 ft and side steps x 3 (limited amb due to pt's complaints of back pain and his concern for bleeding (though no blood noted))  Extremity/Trunk Assessments:  RLE   RLE : Exceptions to WFL  Strength RLE  RLE Overall Strength: Greater than or equal to 3/5 as evidenced by functional mobility  LLE   LLE : Exceptions to  WFL  Strength LLE  LLE Overall Strength: Greater than or equal to 3/5 as evidenced by functional mobility  Treatments:  Ambulation/Gait Training  Ambulation/Gait Training Performed: Yes  Ambulation/Gait Training 2  Surface 2: Level tile  Device 2: No device  Assistance 2: Hand held assistance, Close supervision, Moderate verbal cues  Quality of Gait 2: Wide base of support, Decreased step length  Comments/Distance (ft) 2: 4 side steps towards HOB (some difficulty with (R) vs (L) terminology)  Transfers  Transfer: Yes  Transfer 1  Transfer From 1: Bed to  Transfer to 1: Stand  Transfer Level of Assistance 1: Hand held assistance, Contact guard, Close supervision, Moderate verbal cues  Trials/Comments 1: completed x2 with progression from CGA-> close supervision  Transfers 2  Transfer From 2: Stand to  Transfer to 2: Bed  Transfer Level of Assistance 2: Close supervision, Moderate verbal cues  Trials/Comments 2: completed x2,  hesitates mid sit with both trials needing reassurance to fully sit on bed  Outcome Measures:  [facility] Basic Mobility  Turning from your back to your side while in a flat bed without using bedrails: A little  Moving from lying on your back to sitting on the side of a flat bed without using bedrails: A little  Moving to and from bed to chair (including a wheelchair): A little  Standing up from a chair using your arms (e.g. wheelchair or bedside chair): A little  To walk in hospital room: A little  Climbing 3-5 steps with railing: A little  Basic Mobility - Total Score: 18    Encounter Problems       Encounter Problems (Active)       Balance       Patient will maintain balance to allow for safe mobility with (S)-> (I).       Start:  11/08/24    Expected End:  11/22/24               Mobility       STG - Patient will ambulate with (S)-> (I), 100 ft x2.       Start:  11/08/24    Expected End:  11/22/24               PT Transfers       STG - Patient will perform bed mobility (S)-> (I).        Start:   11/08/24    Expected End:  11/22/24            STG - Patient will transfer sit to and from stand (S)-> (I).        Start:  11/08/24    Expected End:  11/22/24                   Education Documentation  Mobility Training, taught by Janki Wick PT at 11/8/2024  4:26 PM.  Learner: Patient  Readiness: Acceptance  Method: Explanation, Demonstration  Response: Needs Reinforcement  Comment: safety with mobility tasks      11/08/24 at 4:30 PM - Janki Wick, PT

## 2024-11-08 NOTE — CARE PLAN
The patient's goals for the shift include      The clinical goals for the shift include Pt will remain free and more alert this shift    Over the shift,

## 2024-11-08 NOTE — PROGRESS NOTES
INTERNAL MEDICINE PROGRESS NOTE     BRIEF NARRATIVE      Clayton Parra is a 59 y.o. male on day 2 of admission presenting with Altered mental status, unspecified altered mental status type.    Pt with significant psychiatric history including schizophrenia/schizoaffective/bipolar with apparent home medications ( haldol IV 100mg monthly, carbamazepine 400 mg qhs, clozapine 500 mg qhs,  benztropine 0.5 mg BID, propanolol 10mg TID), T2DM  HLD, anemia, HTN, who originally presented to the ED a couple days ago from [facility] for refusing his psych medications.  Was deemed to not have capacity and sent to [facility] for psych admission.  Upon arrival at [facility] patient was found to be alert but nonverbal and not oriented to self place or time.  On arrival to the ED he was found to be diaphoretic rigid.  At the time of this exam he was again diaphoretic and rigid nonverbal his eyes were open but he did not respond to questions and he responded very lightly to noxious stimuli.     While in the ED his vitals were stable.  They attempted an LP unsuccessfully.  His labs were WNL.  CT C/A/P and CT of the head were negative for any acute issues CXR was also negative.  Patient to be admitted for encephalopathy      ASSESSMENT / PLANS      > Catatonia   > Possible NMS. Less likely   > Acute encephalopathy, multifactorial, psychosis v toxic v infectious   > H/o Schizophrenia   Etiology of a catatonia is most likely secondary to antipsychotic. However differential includes possible NMS v serotonin syndrome (tachycardia, hypertension, rigidity, mixed clonus and hyperreflexia), meningitis/ encephalitis less likely (negative white  count, bcx neg, nuchal rigidity noted but  Kernig and Brudzinski signs negative)  - S/p Acyclovir, ampicillin, ceftriaxone and vancomycin in the ED for empiric meningitis coverage. Bcx, WBC negative, afebrile, procal neg. No infectious source identified so far, Will not initiate abx at the moment.  LP pending   - Psych following   - Pt currently meet inpatient psych admission   - Per psych recs, will cont  IV lorazepam 1mg QID  - Stopped Depacon 250mg and started IV Ativan 1mg TID prn for agitation    - Restarted carbamazepine 11/7   - Restated clozapine  11/8 per reinitiation protocol   - Cont to hold benztropine  - Pt is on IV Haldol Dec 200mg IM q28 days last given 10/26/24. Cont   - Routine EEG results pending   - TSH and b12 neg, will check folate ggt and thiamine level   - Patient is on thiamine 100mg daily at home for vit deficiency and Wernicke ppx, will load with IV thiamine for 3 days    - Cont fall precaution   - Cont 1:1 sitter   - PT/OT   - Discussed with patient sister (the guardian) on 11/8 and updated. At baseline patient does ambulate   - PT/OT      > T2DM  - Hold home metformin  - Home insulin regimen: Lantus 70 units nightly, Humalog 18 units 3 times daily with meals  - BG fluctuating between 127- 262. For now will continue Lantus 10u nightly , Lispro 18u TID with meals and #1 SSI. Goal -180   - Cont accucheck      > HTN  > HLD  -Will hold home aspirin and atorvastatin and lisinopril for now until patient is able to take p.o.  - Cont IV lopressor 5mg for SBP > 160  DBP > 110      > Anemia  -Hgb stable  -Will hold home iron     Fluids: monitor and replete as needed  Electrolytes: monitor and replete as needed  Nutrition: Regular diet   GI prophylaxis: as needed  DVT prophylaxis: SCD  Code Status: full code    SUBJECTIVE       The patient seen and examined today, looks much better today compared to the day before. More interactive     OBJECTIVE      Visit Vitals  /88   Pulse 88   Temp  37.1 °C (98.8 °F)   Resp 18        Intake/Output Summary (Last 24 hours) at 11/8/2024 0816  Last data filed at 11/7/2024 2100  Gross per 24 hour   Intake 0 ml   Output 750 ml   Net -750 ml       Physical Exam  Constitutional:       Appearance: He is ill-appearing and toxic-appearing.   HENT:      Head: Normocephalic and atraumatic.      Right Ear: Tympanic membrane normal.      Left Ear: Tympanic membrane normal.   Eyes:      Pupils: Pupils are equal, round, and reactive to light.   Cardiovascular:      Rate and Rhythm: Tachycardia present.   Abdominal:      Palpations: Abdomen is soft.   Musculoskeletal:         General: Normal range of motion.      Cervical back: Rigidity present.   Skin:     Capillary Refill: Capillary refill takes less than 2 seconds.   Neurological:      Mental Status: He is disoriented.        Current Meds   aspirin, 81 mg, oral, Daily  atorvastatin, 40 mg, oral, Nightly  [Held by provider] benztropine, 0.5 mg, oral, BID  carBAMazepine XR, 400 mg, oral, Nightly  enoxaparin, 40 mg, subcutaneous, q24h  ferrous sulfate (325 mg ferrous sulfate), 65 mg of iron, oral, Daily with breakfast  insulin glargine, 10 Units, subcutaneous, Nightly  insulin lispro, 0-10 Units, subcutaneous, q6h  insulin lispro, 18 Units, subcutaneous, TID AC  lisinopril, 20 mg, oral, Daily  LORazepam, 1 mg, intravenous, q6h  [Held by provider] metFORMIN, 1,000 mg, oral, BID  [Held by provider] propranolol, 10 mg, oral, TID  sennosides-docusate sodium, 2 tablet, oral, BID  [Held by provider] thiamine, 100 mg, oral, Daily  thiamine, 500 mg, intravenous, Daily       PRN medications: dextrose, dextrose, dextrose, dextrose, glucagon, glucagon, glucagon, glucagon, metoprolol, [Held by provider] OLANZapine **OR** [Held by provider] OLANZapine, valproate sodium     LABS and IMAGING     WBC   Date Value Ref Range Status   11/07/2024 8.1 4.4 - 11.3 x10*3/uL Final   11/06/2024 10.2 4.4 - 11.3 x10*3/uL Final     Hemoglobin   Date Value  Ref Range Status   11/07/2024 13.2 (L) 13.5 - 17.5 g/dL Final   11/06/2024 14.6 13.5 - 17.5 g/dL Final     Hematocrit   Date Value Ref Range Status   11/07/2024 40.9 (L) 41.0 - 52.0 % Final   11/06/2024 43.2 41.0 - 52.0 % Final     Bicarbonate   Date Value Ref Range Status   11/07/2024 22 21 - 32 mmol/L Final   11/06/2024 21 21 - 32 mmol/L Final     Creatinine   Date Value Ref Range Status   11/07/2024 1.02 0.50 - 1.30 mg/dL Final   11/06/2024 1.07 0.50 - 1.30 mg/dL Final     Calcium   Date Value Ref Range Status   11/07/2024 8.6 8.6 - 10.6 mg/dL Final   11/06/2024 8.9 8.6 - 10.6 mg/dL Final     INR   Date Value Ref Range Status   11/07/2024 1.1 0.9 - 1.1 Final       EEG  IMPRESSION    This EEG is indicative of moderate diffuse encephalopathy. No epileptiform discharges or lateralizing signs are seen. No seizures were captured. Whole body tremors were recorded throughout the recording with no EEG correlate.    A full report will be scanned into the patient's chart at a later time.    This report has been interpreted and electronically signed by  ECG 12 lead  Sinus tachycardia  Possible Left atrial enlargement  Borderline ECG  When compared with ECG of 04-NOV-2024 22:44,  Nonspecific T wave abnormality, improved in Inferior leads  Nonspecific T wave abnormality no longer evident in Lateral leads  See ED provider note for full interpretation and clinical correlation  Confirmed by Lauren Bennett ([id number]) on 11/7/2024 1:06:25 AM         PROBLEM LISTS      Problem List Items Addressed This Visit          Neuro    * (Principal) Altered mental status, unspecified altered mental status type - Primary       This dictation was created with voice recognition software. Although every effort is made to review the dictation as it is transcribed, on occasion spoken words, phrases, names, numbers, punctuation etc can be misinterpreted by the the technology leading to omissions or inappropriate outcomes.     Geraldine Darnell-Elisa,  MD

## 2024-11-09 LAB
ALBUMIN SERPL BCP-MCNC: 3.7 G/DL (ref 3.4–5)
ANION GAP SERPL CALC-SCNC: 15 MMOL/L (ref 10–20)
BUN SERPL-MCNC: 14 MG/DL (ref 6–23)
CALCIUM SERPL-MCNC: 8.7 MG/DL (ref 8.6–10.6)
CHLORIDE SERPL-SCNC: 112 MMOL/L (ref 98–107)
CO2 SERPL-SCNC: 24 MMOL/L (ref 21–32)
CREAT SERPL-MCNC: 0.83 MG/DL (ref 0.5–1.3)
EGFRCR SERPLBLD CKD-EPI 2021: >90 ML/MIN/1.73M*2
ERYTHROCYTE [DISTWIDTH] IN BLOOD BY AUTOMATED COUNT: 13.2 % (ref 11.5–14.5)
GGT SERPL-CCNC: 49 U/L (ref 5–64)
GLUCOSE BLD MANUAL STRIP-MCNC: 133 MG/DL (ref 74–99)
GLUCOSE BLD MANUAL STRIP-MCNC: 146 MG/DL (ref 74–99)
GLUCOSE BLD MANUAL STRIP-MCNC: 180 MG/DL (ref 74–99)
GLUCOSE BLD MANUAL STRIP-MCNC: 196 MG/DL (ref 74–99)
GLUCOSE BLD MANUAL STRIP-MCNC: 224 MG/DL (ref 74–99)
GLUCOSE BLD MANUAL STRIP-MCNC: 332 MG/DL (ref 74–99)
GLUCOSE SERPL-MCNC: 205 MG/DL (ref 74–99)
HCT VFR BLD AUTO: 39.1 % (ref 41–52)
HGB BLD-MCNC: 12.3 G/DL (ref 13.5–17.5)
MCH RBC QN AUTO: 30.4 PG (ref 26–34)
MCHC RBC AUTO-ENTMCNC: 31.5 G/DL (ref 32–36)
MCV RBC AUTO: 97 FL (ref 80–100)
NRBC BLD-RTO: 0 /100 WBCS (ref 0–0)
PHOSPHATE SERPL-MCNC: 3.6 MG/DL (ref 2.5–4.9)
PLATELET # BLD AUTO: 178 X10*3/UL (ref 150–450)
POTASSIUM SERPL-SCNC: 3.8 MMOL/L (ref 3.5–5.3)
RBC # BLD AUTO: 4.04 X10*6/UL (ref 4.5–5.9)
SODIUM SERPL-SCNC: 147 MMOL/L (ref 136–145)
WBC # BLD AUTO: 5.1 X10*3/UL (ref 4.4–11.3)

## 2024-11-09 PROCEDURE — 99231 SBSQ HOSP IP/OBS SF/LOW 25: CPT | Performed by: PSYCHIATRY & NEUROLOGY

## 2024-11-09 PROCEDURE — 36415 COLL VENOUS BLD VENIPUNCTURE: CPT | Performed by: STUDENT IN AN ORGANIZED HEALTH CARE EDUCATION/TRAINING PROGRAM

## 2024-11-09 PROCEDURE — 84425 ASSAY OF VITAMIN B-1: CPT | Performed by: STUDENT IN AN ORGANIZED HEALTH CARE EDUCATION/TRAINING PROGRAM

## 2024-11-09 PROCEDURE — 80069 RENAL FUNCTION PANEL: CPT | Performed by: STUDENT IN AN ORGANIZED HEALTH CARE EDUCATION/TRAINING PROGRAM

## 2024-11-09 PROCEDURE — 2500000002 HC RX 250 W HCPCS SELF ADMINISTERED DRUGS (ALT 637 FOR MEDICARE OP, ALT 636 FOR OP/ED): Performed by: STUDENT IN AN ORGANIZED HEALTH CARE EDUCATION/TRAINING PROGRAM

## 2024-11-09 PROCEDURE — 82947 ASSAY GLUCOSE BLOOD QUANT: CPT

## 2024-11-09 PROCEDURE — 2500000001 HC RX 250 WO HCPCS SELF ADMINISTERED DRUGS (ALT 637 FOR MEDICARE OP): Performed by: STUDENT IN AN ORGANIZED HEALTH CARE EDUCATION/TRAINING PROGRAM

## 2024-11-09 PROCEDURE — 85027 COMPLETE CBC AUTOMATED: CPT | Performed by: STUDENT IN AN ORGANIZED HEALTH CARE EDUCATION/TRAINING PROGRAM

## 2024-11-09 PROCEDURE — 99232 SBSQ HOSP IP/OBS MODERATE 35: CPT | Performed by: STUDENT IN AN ORGANIZED HEALTH CARE EDUCATION/TRAINING PROGRAM

## 2024-11-09 PROCEDURE — 2500000004 HC RX 250 GENERAL PHARMACY W/ HCPCS (ALT 636 FOR OP/ED): Performed by: NURSE PRACTITIONER

## 2024-11-09 PROCEDURE — 1200000002 HC GENERAL ROOM WITH TELEMETRY DAILY

## 2024-11-09 PROCEDURE — S0136 CLOZAPINE, 25 MG: HCPCS | Performed by: STUDENT IN AN ORGANIZED HEALTH CARE EDUCATION/TRAINING PROGRAM

## 2024-11-09 PROCEDURE — 2500000004 HC RX 250 GENERAL PHARMACY W/ HCPCS (ALT 636 FOR OP/ED): Performed by: STUDENT IN AN ORGANIZED HEALTH CARE EDUCATION/TRAINING PROGRAM

## 2024-11-09 PROCEDURE — 82977 ASSAY OF GGT: CPT | Performed by: STUDENT IN AN ORGANIZED HEALTH CARE EDUCATION/TRAINING PROGRAM

## 2024-11-09 RX ORDER — LORAZEPAM 2 MG/ML
1 INJECTION INTRAMUSCULAR EVERY 6 HOURS SCHEDULED
Status: DISCONTINUED | OUTPATIENT
Start: 2024-11-09 | End: 2024-11-12

## 2024-11-09 RX ORDER — CLOZAPINE 25 MG/1
25 TABLET ORAL 2 TIMES DAILY
Status: DISCONTINUED | OUTPATIENT
Start: 2024-11-09 | End: 2024-11-14

## 2024-11-09 RX ORDER — LORAZEPAM 1 MG/1
1 TABLET ORAL EVERY 6 HOURS SCHEDULED
Status: DISCONTINUED | OUTPATIENT
Start: 2024-11-09 | End: 2024-11-09

## 2024-11-09 ASSESSMENT — COGNITIVE AND FUNCTIONAL STATUS - GENERAL
MOBILITY SCORE: 14
MOVING TO AND FROM BED TO CHAIR: A LITTLE
DRESSING REGULAR LOWER BODY CLOTHING: A LITTLE
WALKING IN HOSPITAL ROOM: TOTAL
PERSONAL GROOMING: A LITTLE
DAILY ACTIVITIY SCORE: 17
CLIMB 3 TO 5 STEPS WITH RAILING: TOTAL
TOILETING: A LOT
STANDING UP FROM CHAIR USING ARMS: A LITTLE
EATING MEALS: A LITTLE
HELP NEEDED FOR BATHING: A LITTLE
DRESSING REGULAR UPPER BODY CLOTHING: A LITTLE
TURNING FROM BACK TO SIDE WHILE IN FLAT BAD: A LITTLE
MOVING FROM LYING ON BACK TO SITTING ON SIDE OF FLAT BED WITH BEDRAILS: A LITTLE

## 2024-11-09 ASSESSMENT — PAIN SCALES - GENERAL
PAINLEVEL_OUTOF10: 0 - NO PAIN
PAINLEVEL_OUTOF10: 0 - NO PAIN

## 2024-11-09 ASSESSMENT — PAIN SCALES - WONG BAKER: WONGBAKER_NUMERICALRESPONSE: NO HURT

## 2024-11-09 ASSESSMENT — PAIN - FUNCTIONAL ASSESSMENT: PAIN_FUNCTIONAL_ASSESSMENT: 0-10

## 2024-11-09 NOTE — CARE PLAN
The patient's goals for the shift include      The clinical goals for the shift include Pt will remain free of falls and injury throughout the shift    Over the shift, the patient remained free of falls and injury. Pt refused all PO meds, Md notified. Pt educated but continues to refuse. Pt observer at bedside for safety, assessment ongoing.

## 2024-11-09 NOTE — ED PROCEDURE NOTE
Procedure  Critical Care    Performed by: Pedro Méndez MD  Authorized by: Pedro Méndez MD    Critical care provider statement:     Critical care time (minutes):  37    Critical care time was exclusive of:  Separately billable procedures and treating other patients and teaching time    Critical care was necessary to treat or prevent imminent or life-threatening deterioration of the following conditions:  CNS failure or compromise    Critical care was time spent personally by me on the following activities:  Evaluation of patient's response to treatment, examination of patient, obtaining history from patient or surrogate, ordering and performing treatments and interventions, ordering and review of laboratory studies, ordering and review of radiographic studies, pulse oximetry, re-evaluation of patient's condition, development of treatment plan with patient or surrogate and review of old charts               Pedro Méndez MD  11/09/24 0036

## 2024-11-09 NOTE — PROGRESS NOTES
.                                                                                                                                                                                                                                                                                             INTERNAL MEDICINE PROGRESS NOTE     BRIEF NARRATIVE      Clayton Parra is a 59 y.o. male on day 3 of admission presenting with Altered mental status, unspecified altered mental status type.    Pt with significant psychiatric history including schizophrenia/schizoaffective/bipolar with apparent home medications ( haldol IV 100mg monthly, carbamazepine 400 mg qhs, clozapine 500 mg qhs,  benztropine 0.5 mg BID, propanolol 10mg TID), T2DM  HLD, anemia, HTN, who originally presented to the ED a couple days ago from [facility] for refusing his psych medications.  Was deemed to not have capacity and sent to [facility] for psych admission.  Upon arrival at [facility] patient was found to be alert but nonverbal and not oriented to self place or time.  On arrival to the ED he was found to be diaphoretic rigid.  At the time of this exam he was again diaphoretic and rigid nonverbal his eyes were open but he did not respond to questions and he responded very lightly to noxious stimuli.     While in the ED his vitals were stable.  They attempted an LP unsuccessfully.  His labs were WNL.  CT C/A/P and CT of the head were negative for any acute issues CXR was also negative.  Patient to be admitted for encephalopathy      ASSESSMENT / PLANS      > Catatonia   > Possible NMS. Less likely   > Acute encephalopathy, multifactorial, psychosis v toxic v infectious   > H/o Schizophrenia   > Xanthochromic CSF without evidence of SAH   Etiology of a catatonia is most likely secondary to antipsychotic. However differential includes possible NMS v serotonin syndrome (tachycardia, hypertension, rigidity, mixed clonus and hyperreflexia),  meningitis/ encephalitis less likely (negative white count, bcx neg, nuchal rigidity noted but  Kernig and Brudzinski signs negative).   CT head on 11/6 negative for intracranial hemorrhage   - S/p Acyclovir, ampicillin, ceftriaxone and vancomycin in the ED for empiric meningitis coverage. Bcx, WBC negative, afebrile, procal neg. No infectious source identified so far, Will not initiate abx at the moment.    - LP 11/8 : Neutrophils 42, protein 83, glucose 151, RBC 3000.   - Psych following   - Pt currently meet inpatient psych admission   - Per psych recs, will cont  IV lorazepam 1mg QID (Transitioned to po 11/9)   - Stopped Depacon 250mg and started IV Ativan 1mg TID prn for agitation.   - Restarted carbamazepine 11/7   - Restated propanolol 10mg TID 11/8   - Restated clozapine 11/8 per reinitiation protocol   - Cont to hold benztropine ? Psych to reevaluate   - Pt is on IV Haldol Dec 200mg IM q28 days last given 10/26/24. Cont   - Routine EEG results pending   - TSH and b12 neg, will check folate ggt and thiamine level   - Patient is on thiamine 100mg daily at home for vit deficiency and Wernicke ppx, will load with IV thiamine for 3 days. Will resume home dose afterward    - Cont fall precaution   - Cont 1:1 sitter  as needed   - PT/OT   - Discussed with patient sister (the guardian) on 11/8 and updated. At baseline patient does ambulate   - PT/OT      > T2DM  - Hold home metformin  - Home insulin regimen: Lantus 70 units nightly, Humalog 18 units 3 times daily with meals  - BG fluctuating between 127- 262. For now will continue Lantus 10u nightly , Lispro 18u TID with meals and #1 SSI. Goal -180   - Cont accucheck      > HTN  > HLD  -Will hold home aspirin and atorvastatin and lisinopril for now until patient is able to take p.o.  - Cont IV lopressor 5mg for SBP > 160  DBP > 110      > Anemia  -Hgb stable  -Will hold home iron     Fluids: monitor and replete as needed  Electrolytes: monitor and replete as  needed  Nutrition: Regular diet   GI prophylaxis: as needed  DVT prophylaxis: SCD  Code Status: full code    SUBJECTIVE       The patient seen and examined today, looks much better today compared to the day before. More interactive     OBJECTIVE      Visit Vitals  /81   Pulse 76   Temp 36.6 °C (97.9 °F)   Resp 17      No intake or output data in the 24 hours ending 11/09/24 0734      Physical Exam  Constitutional:       Appearance: He is ill-appearing and toxic-appearing.   HENT:      Head: Normocephalic and atraumatic.      Right Ear: Tympanic membrane normal.      Left Ear: Tympanic membrane normal.   Eyes:      Pupils: Pupils are equal, round, and reactive to light.   Cardiovascular:      Rate and Rhythm: Tachycardia present.   Abdominal:      Palpations: Abdomen is soft.   Musculoskeletal:         General: Normal range of motion.      Cervical back: Rigidity present.   Skin:     Capillary Refill: Capillary refill takes less than 2 seconds.   Neurological:      Mental Status: He is disoriented.        Current Meds   aspirin, 81 mg, oral, Daily  atorvastatin, 40 mg, oral, Nightly  [Held by provider] benztropine, 0.5 mg, oral, BID  carBAMazepine XR, 400 mg, oral, Nightly  cloZAPine, 25 mg, oral, Nightly  docusate sodium, 100 mg, oral, BID  enoxaparin, 40 mg, subcutaneous, q24h  ferrous sulfate (325 mg ferrous sulfate), 65 mg of iron, oral, Daily with breakfast  insulin glargine, 10 Units, subcutaneous, Nightly  insulin lispro, 0-10 Units, subcutaneous, q6h  insulin lispro, 18 Units, subcutaneous, TID AC  lisinopril, 20 mg, oral, Daily  LORazepam, 1 mg, intravenous, q6h  [Held by provider] metFORMIN, 1,000 mg, oral, BID  polyethylene glycol, 17 g, oral, Daily  [Held by provider] propranolol, 10 mg, oral, TID  [Held by provider] thiamine, 100 mg, oral, Daily  thiamine, 500 mg, intravenous, Daily       PRN medications: dextrose, dextrose, dextrose, dextrose, glucagon, glucagon, glucagon, glucagon, LORazepam,  metoprolol     LABS and IMAGING     WBC   Date Value Ref Range Status   11/08/2024 6.5 4.4 - 11.3 x10*3/uL Final   11/07/2024 8.1 4.4 - 11.3 x10*3/uL Final     Hemoglobin   Date Value Ref Range Status   11/08/2024 12.5 (L) 13.5 - 17.5 g/dL Final   11/07/2024 13.2 (L) 13.5 - 17.5 g/dL Final     Hematocrit   Date Value Ref Range Status   11/08/2024 39.3 (L) 41.0 - 52.0 % Final   11/07/2024 40.9 (L) 41.0 - 52.0 % Final     Bicarbonate   Date Value Ref Range Status   11/08/2024 20 (L) 21 - 32 mmol/L Final   11/07/2024 22 21 - 32 mmol/L Final     Creatinine   Date Value Ref Range Status   11/08/2024 0.92 0.50 - 1.30 mg/dL Final   11/07/2024 1.02 0.50 - 1.30 mg/dL Final     Calcium   Date Value Ref Range Status   11/08/2024 8.8 8.6 - 10.6 mg/dL Final   11/07/2024 8.6 8.6 - 10.6 mg/dL Final     INR   Date Value Ref Range Status   11/07/2024 1.1 0.9 - 1.1 Final       FL guided lumbar puncture  Narrative: Interpreted By:  Endy Boss,   STUDY:  FL GUIDED LUMBAR PUNCTURE;  11/8/2024 2:47 pm      INDICATION:  Signs/Symptoms:lumbar puncture-- several attempts in the ED.          COMPARISON:  None.      ACCESSION NUMBER(S):  [id number]      ORDERING CLINICIAN:  LUIS CORTÉS      TECHNIQUE:  After discussion of risks, benefits, and alternatives, oral and  written informed consent was obtained. The patient was placed in  prone position on exam table. The L3-4 interspace was then marked  under fluoroscopy. The patient was then prepped and draped in sterile  fashion. Local anesthesia was achieved with 2% Lidocaine solution. A  20 gauge spinal needle was then introduced at the L3-L4 level under  direct fluoroscopic guidance until return of CSF was demonstrated. 10  ml of clear CSF was collected in 4 tubes. The stylet was then  replaced and the spinal needle was removed.  Hemostasis was achieved  with direct pressure and the area was dressed with a Band-Aid. The  patient tolerated procedure well without any immediate or  clinically  apparent complications. Total fluoroscopy time was 0.5 MINUTES.      The collected CSF was then sent to the lab for appropriate lab tests  as ordered by the referring physician.      FINDINGS:  A single periprocedural spot fluoroscopic image was provided for  interpretation. Image quality is such that fine bony detail is  obscured. A needle is seen to overlie the posterior elements of L4.      Impression: Successful fluoroscopic guided lumbar puncture.      I personally reviewed the images/study and I agree with the findings  as stated by Endy Boss DO PGY-2. This study was interpreted at  [place], [place].      MACRO:  None          Dictation workstation:   [id number]         PROBLEM LISTS      Problem List Items Addressed This Visit          Neuro    * (Principal) Altered mental status, unspecified altered mental status type - Primary       This dictation was created with voice recognition software. Although every effort is made to review the dictation as it is transcribed, on occasion spoken words, phrases, names, numbers, punctuation etc can be misinterpreted by the the technology leading to omissions or inappropriate outcomes.     Geraldine Rojas MD

## 2024-11-09 NOTE — PROGRESS NOTES
Clayton Parra is a 59 y.o. y.o. male on day 3 of admission presenting with Altered mental status, unspecified altered mental status type [R41.82].     Subjective   Patient is not medically ready for discharge and is currently meeting criteria for inpatient psychiatry. Patient is a LTC resident but per conversation with [facility] yesterday, sister is interested in new placement. Mass referral was sent out via [employer].    Call placed to patients sister/legal guardian to further discuss discharge planning; sister reports that when patient is at the facility and on his monthly DE LA CRUZ, he does very well. She reports that this has not been happening at [facility] which has led to his decompensation. Discussed that this information would be passed off to psychiatry team on Monday to address in the event he returns to [facility]. Discussed with sister that mass referral was sent out and [facility] have behavioral units and are able to accept. Sister had additional questions regarding the facilities and was agreeable for liaison to call her to further discuss.     [facility] updated in [facility]; will further discuss with psychiatry on Monday regarding DE LA CRUZ.     - Halina QUIJANO MA, \[facility]\""  Care Transitions   Epic Secure Chat or [phone]

## 2024-11-09 NOTE — PROGRESS NOTES
"Clayton Parra is a 59 y.o. male on hospital day 3 of admission presenting with Altered mental status, unspecified altered mental status type.    Subjective   Patient seen at bedside. Mood \"much better\". Slept well. Appetite good. Denies SE for medicines. Denies SI/HI/AVH.     Objective     Vital Signs:      11/8/2024     9:22 AM 11/8/2024     3:40 PM 11/8/2024     3:42 PM 11/8/2024     3:44 PM 11/8/2024     3:46 PM 11/8/2024    11:57 PM 11/9/2024     7:00 AM   Vitals   Systolic 137 149 163 157 130 136 146   Diastolic 76 82 77 83 91 81 86   Heart Rate 90    85 76 81   Temp 37.2 °C (99 °F)    36.1 °C (97 °F) 36.6 °C (97.9 °F) 36.6 °C (97.9 °F)   Resp 17    17  17      Intake/Output last 3 Shifts:  I/O last 3 completed shifts:  In: - (0 mL/kg)   Out: 500 (5 mL/kg) [Urine:500 (0.1 mL/kg/hr)]  Weight: 99.8 kg     Mental Status Exam:  General/Appearance: NAD, appears stated age, in hospital gown, body habitus: obese, grooming/hygiene is reasonable for setting  Attitude/Behavior: somewhat engaged in interview, fleeting eye contact, calm  Speech: mumbling, low rate, lacking tone, low volume  Motor Activity: no psychomotor agitation/retardation, tics/tremors - BUE resting, gait: not assessed  Mood: \"much better\"  Affect: Blunted  Thought Process: Disorganized  Thought Content: Denies SI/HI, Delusional thinking: none elicited  Thought Perception: denies AVH  Cognition: no overt deficits noted, not formally assessed  Insight: impaired  Judgment: poor      Current Medications  Scheduled   aspirin, 81 mg, oral, Daily  atorvastatin, 40 mg, oral, Nightly  [Held by provider] benztropine, 0.5 mg, oral, BID  carBAMazepine XR, 400 mg, oral, Nightly  cloZAPine, 25 mg, oral, BID  docusate sodium, 100 mg, oral, BID  enoxaparin, 40 mg, subcutaneous, q24h  ferrous sulfate (325 mg ferrous sulfate), 65 mg of iron, oral, Daily with breakfast  insulin glargine, 10 Units, subcutaneous, Nightly  insulin lispro, 0-10 Units, subcutaneous, " q6h  insulin lispro, 18 Units, subcutaneous, TID AC  lisinopril, 20 mg, oral, Daily  LORazepam, 1 mg, oral, q6h YUNG  [Held by provider] metFORMIN, 1,000 mg, oral, BID  polyethylene glycol, 17 g, oral, Daily  propranolol, 10 mg, oral, TID  [Held by provider] thiamine, 100 mg, oral, Daily  thiamine, 500 mg, intravenous, Daily          PRN   PRN medications: dextrose, dextrose, dextrose, dextrose, glucagon, glucagon, glucagon, glucagon, LORazepam, metoprolol     Labs  Results for orders placed or performed during the hospital encounter of 11/06/24 (from the past 24 hours)   Glucose, CSF   Result Value Ref Range    Glucose,  (H) 40 - 70 mg/dL   Protein, CSF   Result Value Ref Range    Total Protein, CSF 83 (H) 15 - 45 mg/dL   CSF Culture/Smear    Specimen: Lumbar Puncture; Cerebrospinal Fluid   Result Value Ref Range    CSF Culture/Smear No growth to date     Gram Stain No polymorphonuclear leukocytes seen     Gram Stain No organisms seen    CSF Cell Count   Result Value Ref Range    Tube Number, CSF Tube 3       Color, CSF Colorless Colorless    Clarity, CSF Hazy (A) Clear    Color, Supernatant CSF Colorless      WBC, CSF 4 1 - 5 /uL    RBC, CSF 1,000 (H) 0 - 5 /uL   CSF Differential   Result Value Ref Range    Neutrophils %, Manual, CSF 42 (H) 0 - 5 %    Lymphocytes %, Manual, CSF 37 28 - 96 %    Mono/Macrophages %, Manual, CSF 16 16 - 56 %    Eosinophils %, Manual, CSF 5 Rare %    Basophils %, Manual, CSF 0 Not Established %    Immature Granulocytes %, Manual, CSF 0 Not Established %    Blasts %, Manual, CSF 0 Not Established %    Unclassified Cells %, Manual, CSF 0 Not Established %    Plasma Cells %, Manual, CSF 0 Not Established %    Total Cells Counted,     CSF Cell Count   Result Value Ref Range    Tube Number, CSF Tube 1       Color, CSF Pink (A) Colorless    Clarity, CSF Hazy (A) Clear    Color, Supernatant CSF Xanthochromic      WBC, CSF 5 1 - 5 /uL    RBC, CSF 3,000 (H) 0 - 5 /uL   CSF  Differential   Result Value Ref Range    Neutrophils %, Manual, CSF 48 (H) 0 - 5 %    Lymphocytes %, Manual, CSF 37 28 - 96 %    Mono/Macrophages %, Manual, CSF 14 (L) 16 - 56 %    Eosinophils %, Manual, CSF 1 Rare %    Basophils %, Manual, CSF 0 Not Established %    Immature Granulocytes %, Manual, CSF 0 Not Established %    Blasts %, Manual, CSF 0 Not Established %    Unclassified Cells %, Manual, CSF 0 Not Established %    Plasma Cells %, Manual, CSF 0 Not Established %    Total Cells Counted,     POCT GLUCOSE   Result Value Ref Range    POCT Glucose 298 (H) 74 - 99 mg/dL   POCT GLUCOSE   Result Value Ref Range    POCT Glucose 155 (H) 74 - 99 mg/dL   POCT GLUCOSE   Result Value Ref Range    POCT Glucose 196 (H) 74 - 99 mg/dL   POCT GLUCOSE   Result Value Ref Range    POCT Glucose 180 (H) 74 - 99 mg/dL   Gamma-Glutamyl Transferase   Result Value Ref Range    GGT 49 5 - 64 U/L   CBC   Result Value Ref Range    WBC 5.1 4.4 - 11.3 x10*3/uL    nRBC 0.0 0.0 - 0.0 /100 WBCs    RBC 4.04 (L) 4.50 - 5.90 x10*6/uL    Hemoglobin 12.3 (L) 13.5 - 17.5 g/dL    Hematocrit 39.1 (L) 41.0 - 52.0 %    MCV 97 80 - 100 fL    MCH 30.4 26.0 - 34.0 pg    MCHC 31.5 (L) 32.0 - 36.0 g/dL    RDW 13.2 11.5 - 14.5 %    Platelets 178 150 - 450 x10*3/uL   Renal Function Panel   Result Value Ref Range    Glucose 205 (H) 74 - 99 mg/dL    Sodium 147 (H) 136 - 145 mmol/L    Potassium 3.8 3.5 - 5.3 mmol/L    Chloride 112 (H) 98 - 107 mmol/L    Bicarbonate 24 21 - 32 mmol/L    Anion Gap 15 10 - 20 mmol/L    Urea Nitrogen 14 6 - 23 mg/dL    Creatinine 0.83 0.50 - 1.30 mg/dL    eGFR >90 >60 mL/min/1.73m*2    Calcium 8.7 8.6 - 10.6 mg/dL    Phosphorus 3.6 2.5 - 4.9 mg/dL    Albumin 3.7 3.4 - 5.0 g/dL   POCT GLUCOSE   Result Value Ref Range    POCT Glucose 224 (H) 74 - 99 mg/dL   POCT GLUCOSE   Result Value Ref Range    POCT Glucose 332 (H) 74 - 99 mg/dL        Imaging  FL guided lumbar puncture    Result Date: 11/8/2024  Interpreted By:  Gera  Endy, STUDY: FL GUIDED LUMBAR PUNCTURE;  11/8/2024 2:47 pm   INDICATION: Signs/Symptoms:lumbar puncture-- several attempts in the ED.     COMPARISON: None.   ACCESSION NUMBER(S): [id number]   ORDERING CLINICIAN: LUIS CORTÉS   TECHNIQUE: After discussion of risks, benefits, and alternatives, oral and written informed consent was obtained. The patient was placed in prone position on exam table. The L3-4 interspace was then marked under fluoroscopy. The patient was then prepped and draped in sterile fashion. Local anesthesia was achieved with 2% Lidocaine solution. A 20 gauge spinal needle was then introduced at the L3-L4 level under direct fluoroscopic guidance until return of CSF was demonstrated. 10 ml of clear CSF was collected in 4 tubes. The stylet was then replaced and the spinal needle was removed.  Hemostasis was achieved with direct pressure and the area was dressed with a Band-Aid. The patient tolerated procedure well without any immediate or clinically apparent complications. Total fluoroscopy time was 0.5 MINUTES.   The collected CSF was then sent to the lab for appropriate lab tests as ordered by the referring physician.   FINDINGS: A single periprocedural spot fluoroscopic image was provided for interpretation. Image quality is such that fine bony detail is obscured. A needle is seen to overlie the posterior elements of L4.       Successful fluoroscopic guided lumbar puncture.   I personally reviewed the images/study and I agree with the findings as stated by Endy Boss DO PGY-2. This study was interpreted at [place], [place].   MACRO: None     Dictation workstation:   [id number]      Psychiatric Risk Assessment  Acute Risk of Harm to Others is Considered: Chronically greater than the general population  Acute Risk of Harm to Self is Considered: Chronically greater than the general population    Assessment:  59M history of schizophrenia (bipolar?)  and diabetes, who returned to ED from inpatient psych for medical exam due to worsening alterations in mentation, refusal to take PO, and unstable vital signs. Psychiatry was consulted on 11/6 for psychiatric evaluation. His outpatient medication regimen appears to be benztropine 0.5 mg BID, carbamazepine 400 mg nightly, clozapine 500 mg nightly, propranolol 10 mg TID.     On initial assessment, patient is unable to participate in examination due to altered mentation. He is diffusely rigid and tremulous. He is also approaching 48 hour devon since last clozapine dose. At this juncture, will need robust workup to rule out infectious/metabolic causes of altered mentation, rigidity, vital instability. Broad differential would consider infectious causes, metabolic derangements, NMS, encephalitis, meningitis, catatonia, clozapine withdrawal. At present would avoid antipsychotic administration until NMS is ruled out, may be more beneficial to utilize benzodiazepines while monitoring for vital depression.     11/7: He continues to remain unresponsive with generalized rigidity and fine tremors with repetition of unclear sounds. NMS vs malignant catatonia with possible contributing clozapine withdrawal. Walters Michael catatonia rating scale score initially 32, with improvement to 23 after an ativan challenge with 1 mg IV. However, difficult to rule out NMS or clozapine withdrawal given unknown previous baseline, control of schizophrenia, and exact time of last clozapine dose.    11/9: Mumbling responses that at time seems not appropriate for questions. Took clozapine yesterday but refused AM meds    Impression:  Catatonia   Schizophrenia  R/o NMS, clozapine withdrawal    Recommendations:  Safety/Monitoring:  Patient meets criteria for inpatient psychiatric admission  Patient does require 1:1 observation, despite enhanced safety features  Daily interdisciplinary safety and planning huddle with Psychiatry  Video monitoring as with  all patients on the MPU  Psychiatry will follow patient daily while on the MPU    Medications:  - Increase to clozapine at 25 mg BID, can use ODT given issues with PO (must use clozapine order panel)    - hold cogentin   - continue home carbamazepine   - continue scheduled ativan 1 mg q6h       Considerations:  Therapies recommended:  will continue to assess for benefit and meaningful participation    Delirium Guidelines  Provide glasses, hearing aids and/or communication boards as needed for impairments  Frequent reorientation, minimize room and staff changes  Open blinds during the day, dark/quiet room at night   Minimal interruptions and daytime naps  Early evaluation and intervention by PT, out of bed as tolerated  Minimize use of restraints   Minimize use of benzodiazepines, anticholinergic medications, and opiates (while ensuring adequate treatment of pain)  Keep Mg>2, K>4 (as able)  Ensure regular bowel and bladder function (as able)    Disposition/Discharge Planning:  SW following, appreciate assistance      Multidisciplinary Rounding  Attending Physician, Medical Student, Nurse, Pharmacist, and Social Work    Medication Consent  N/A - Consult Service    Singh Franklin MD

## 2024-11-10 VITALS
OXYGEN SATURATION: 99 % | HEART RATE: 87 BPM | SYSTOLIC BLOOD PRESSURE: 128 MMHG | DIASTOLIC BLOOD PRESSURE: 68 MMHG | TEMPERATURE: 96.8 F | HEIGHT: 68 IN | BODY MASS INDEX: 33.34 KG/M2 | WEIGHT: 220 LBS | RESPIRATION RATE: 18 BRPM

## 2024-11-10 LAB
ALBUMIN SERPL BCP-MCNC: 3.7 G/DL (ref 3.4–5)
ALP SERPL-CCNC: 94 U/L (ref 33–136)
ALT SERPL W P-5'-P-CCNC: 34 U/L (ref 10–52)
ANION GAP SERPL CALC-SCNC: 14 MMOL/L (ref 10–20)
AST SERPL W P-5'-P-CCNC: 42 U/L (ref 9–39)
BACTERIA BLD CULT: NORMAL
BACTERIA BLD CULT: NORMAL
BACTERIA CSF CULT: NORMAL
BILIRUB SERPL-MCNC: 0.4 MG/DL (ref 0–1.2)
BUN SERPL-MCNC: 14 MG/DL (ref 6–23)
CALCIUM SERPL-MCNC: 8.8 MG/DL (ref 8.6–10.6)
CHLORIDE SERPL-SCNC: 108 MMOL/L (ref 98–107)
CO2 SERPL-SCNC: 26 MMOL/L (ref 21–32)
CREAT SERPL-MCNC: 0.93 MG/DL (ref 0.5–1.3)
EGFRCR SERPLBLD CKD-EPI 2021: >90 ML/MIN/1.73M*2
ERYTHROCYTE [DISTWIDTH] IN BLOOD BY AUTOMATED COUNT: 12.9 % (ref 11.5–14.5)
GLUCOSE BLD MANUAL STRIP-MCNC: 106 MG/DL (ref 74–99)
GLUCOSE BLD MANUAL STRIP-MCNC: 177 MG/DL (ref 74–99)
GLUCOSE BLD MANUAL STRIP-MCNC: 267 MG/DL (ref 74–99)
GLUCOSE BLD MANUAL STRIP-MCNC: 280 MG/DL (ref 74–99)
GLUCOSE SERPL-MCNC: 257 MG/DL (ref 74–99)
GRAM STN SPEC: NORMAL
GRAM STN SPEC: NORMAL
HCT VFR BLD AUTO: 38.7 % (ref 41–52)
HGB BLD-MCNC: 12.7 G/DL (ref 13.5–17.5)
MCH RBC QN AUTO: 31.5 PG (ref 26–34)
MCHC RBC AUTO-ENTMCNC: 32.8 G/DL (ref 32–36)
MCV RBC AUTO: 96 FL (ref 80–100)
NRBC BLD-RTO: 0 /100 WBCS (ref 0–0)
PLATELET # BLD AUTO: 181 X10*3/UL (ref 150–450)
POTASSIUM SERPL-SCNC: 3.6 MMOL/L (ref 3.5–5.3)
PROT SERPL-MCNC: 6.5 G/DL (ref 6.4–8.2)
RBC # BLD AUTO: 4.03 X10*6/UL (ref 4.5–5.9)
SODIUM SERPL-SCNC: 144 MMOL/L (ref 136–145)
WBC # BLD AUTO: 5.5 X10*3/UL (ref 4.4–11.3)

## 2024-11-10 PROCEDURE — 2500000004 HC RX 250 GENERAL PHARMACY W/ HCPCS (ALT 636 FOR OP/ED): Performed by: NURSE PRACTITIONER

## 2024-11-10 PROCEDURE — 85027 COMPLETE CBC AUTOMATED: CPT | Performed by: STUDENT IN AN ORGANIZED HEALTH CARE EDUCATION/TRAINING PROGRAM

## 2024-11-10 PROCEDURE — 99232 SBSQ HOSP IP/OBS MODERATE 35: CPT | Performed by: STUDENT IN AN ORGANIZED HEALTH CARE EDUCATION/TRAINING PROGRAM

## 2024-11-10 PROCEDURE — 2500000004 HC RX 250 GENERAL PHARMACY W/ HCPCS (ALT 636 FOR OP/ED): Performed by: STUDENT IN AN ORGANIZED HEALTH CARE EDUCATION/TRAINING PROGRAM

## 2024-11-10 PROCEDURE — 82947 ASSAY GLUCOSE BLOOD QUANT: CPT

## 2024-11-10 PROCEDURE — 2500000001 HC RX 250 WO HCPCS SELF ADMINISTERED DRUGS (ALT 637 FOR MEDICARE OP): Performed by: STUDENT IN AN ORGANIZED HEALTH CARE EDUCATION/TRAINING PROGRAM

## 2024-11-10 PROCEDURE — 36415 COLL VENOUS BLD VENIPUNCTURE: CPT | Performed by: STUDENT IN AN ORGANIZED HEALTH CARE EDUCATION/TRAINING PROGRAM

## 2024-11-10 PROCEDURE — 80053 COMPREHEN METABOLIC PANEL: CPT | Performed by: STUDENT IN AN ORGANIZED HEALTH CARE EDUCATION/TRAINING PROGRAM

## 2024-11-10 PROCEDURE — 1200000002 HC GENERAL ROOM WITH TELEMETRY DAILY

## 2024-11-10 PROCEDURE — 2500000002 HC RX 250 W HCPCS SELF ADMINISTERED DRUGS (ALT 637 FOR MEDICARE OP, ALT 636 FOR OP/ED): Performed by: STUDENT IN AN ORGANIZED HEALTH CARE EDUCATION/TRAINING PROGRAM

## 2024-11-10 PROCEDURE — 99231 SBSQ HOSP IP/OBS SF/LOW 25: CPT | Performed by: PSYCHIATRY & NEUROLOGY

## 2024-11-10 RX ORDER — INSULIN GLARGINE 100 [IU]/ML
15 INJECTION, SOLUTION SUBCUTANEOUS NIGHTLY
Status: DISCONTINUED | OUTPATIENT
Start: 2024-11-10 | End: 2024-11-15

## 2024-11-10 ASSESSMENT — COGNITIVE AND FUNCTIONAL STATUS - GENERAL
CLIMB 3 TO 5 STEPS WITH RAILING: TOTAL
HELP NEEDED FOR BATHING: A LITTLE
DRESSING REGULAR LOWER BODY CLOTHING: A LITTLE
DRESSING REGULAR UPPER BODY CLOTHING: A LITTLE
PERSONAL GROOMING: A LITTLE
STANDING UP FROM CHAIR USING ARMS: A LITTLE
TURNING FROM BACK TO SIDE WHILE IN FLAT BAD: A LITTLE
MOVING FROM LYING ON BACK TO SITTING ON SIDE OF FLAT BED WITH BEDRAILS: A LITTLE
TOILETING: A LITTLE
MOBILITY SCORE: 16
WALKING IN HOSPITAL ROOM: A LITTLE
DAILY ACTIVITIY SCORE: 19
MOVING TO AND FROM BED TO CHAIR: A LITTLE

## 2024-11-10 ASSESSMENT — PAIN SCALES - GENERAL
PAINLEVEL_OUTOF10: 0 - NO PAIN
PAINLEVEL_OUTOF10: 0 - NO PAIN

## 2024-11-10 ASSESSMENT — PAIN - FUNCTIONAL ASSESSMENT: PAIN_FUNCTIONAL_ASSESSMENT: WONG-BAKER FACES

## 2024-11-10 ASSESSMENT — PAIN SCALES - WONG BAKER: WONGBAKER_NUMERICALRESPONSE: NO HURT

## 2024-11-10 NOTE — PROGRESS NOTES
.                                                                                                                                                                                                                                                                                             INTERNAL MEDICINE PROGRESS NOTE     BRIEF NARRATIVE      Clayton Parra is a 59 y.o. male on day 4 of admission presenting with Altered mental status, unspecified altered mental status type.    Pt with significant psychiatric history including schizophrenia/schizoaffective/bipolar with apparent home medications ( haldol IV 100mg monthly, carbamazepine 400 mg qhs, clozapine 500 mg qhs,  benztropine 0.5 mg BID, propanolol 10mg TID), T2DM  HLD, anemia, HTN, who originally presented to the ED a couple days ago from [facility] for refusing his psych medications.  Was deemed to not have capacity and sent to [facility] for psych admission.  Upon arrival at [facility] patient was found to be alert but nonverbal and not oriented to self place or time.  On arrival to the ED he was found to be diaphoretic rigid.  At the time of this exam he was again diaphoretic and rigid nonverbal his eyes were open but he did not respond to questions and he responded very lightly to noxious stimuli.     While in the ED his vitals were stable.  They attempted an LP unsuccessfully.  His labs were WNL.  CT C/A/P and CT of the head were negative for any acute issues CXR was also negative.  Patient was admitted for encephalopathy.       ASSESSMENT / PLANS      > Catatonia   > Possible NMS. Less likely   > Acute encephalopathy, multifactorial, psychosis v toxic v infectious   > H/o Schizophrenia   > Xanthochromic CSF without evidence of SAH   Etiology of a catatonia is most likely secondary to antipsychotic. However differential includes possible NMS v serotonin syndrome (tachycardia, hypertension, rigidity, mixed clonus and hyperreflexia),  meningitis/ encephalitis less likely (negative white count, bcx neg, nuchal rigidity noted but  Kernig and Brudzinski signs negative).   CT head on 11/6 negative for intracranial hemorrhage   - S/p Acyclovir, ampicillin, ceftriaxone and vancomycin in the ED for empiric meningitis coverage. Bcx, WBC negative, afebrile, procal neg. No infectious source identified so far, Will not initiate abx at the moment.    - LP 11/8 : Neutrophils 42, protein 83, glucose 151, RBC 3000.   - Psych following   - Pt currently meet inpatient psych admission   - Per psych recs, will cont  IV lorazepam 1mg QID (Transitioned to po 11/9)   - Stopped Depacon 250mg and started IV Ativan 1mg TID prn for agitation.   - Restarted carbamazepine 11/7   - Restated propanolol 10mg TID 11/8   - Restated clozapine 11/8 per reinitiation protocol   - Cont to hold benztropine ? Psych to reevaluate   - Pt is on IV Haldol Dec 200mg IM q28 days last given 10/26/24. Cont   - Routine EEG results pending   - TSH, b12, GGT and folate wnl,  thiamine level pending   - Patient is on thiamine 100mg daily at home for vit deficiency and Wernicke ppx, will load with IV thiamine for 3 days. Will resume home dose afterward    - EKG on 11/8 noted normal Qtc, obtain EKG q72 when on antipsychotics  - Cont fall precaution   - Cont 1:1 sitter  as needed   - PT/OT   - Discussed with patient sister (the guardian) on 11/8 and updated. At baseline patient does ambulate   - PT/OT      > T2DM  - Hold home metformin  - Home insulin regimen: Lantus 70 units nightly, Humalog 18 units 3 times daily with meals  - BG fluctuating between 127- 262. For now will continue Lantus 10u nightly , Lispro 18u TID with meals and #1 SSI. Goal -180   - Cont accucheck      > HTN  > HLD  -Will hold home aspirin and atorvastatin and lisinopril for now until patient is able to take p.o.  - Cont IV lopressor 5mg for SBP > 160  DBP > 110      > Anemia  -Hgb stable  -Will hold home iron     Fluids:  monitor and replete as needed  Electrolytes: monitor and replete as needed  Nutrition: Regular diet   GI prophylaxis: as needed  DVT prophylaxis: SCD  Code Status: full code    SUBJECTIVE       Patient seen and examined today, looks much much better compared to the day before.  Patient appeared to be close to his baseline now.    OBJECTIVE      Visit Vitals  /86   Pulse 77   Temp 36.6 °C (97.9 °F)   Resp 18      No intake or output data in the 24 hours ending 11/10/24 0754      Physical Exam  Constitutional:       Appearance: He is ill-appearing and toxic-appearing.   HENT:      Head: Normocephalic and atraumatic.      Right Ear: Tympanic membrane normal.      Left Ear: Tympanic membrane normal.   Eyes:      Pupils: Pupils are equal, round, and reactive to light.   Cardiovascular:      Rate and Rhythm: Tachycardia present.   Abdominal:      Palpations: Abdomen is soft.   Musculoskeletal:         General: Normal range of motion.      Cervical back: Rigidity present.   Skin:     Capillary Refill: Capillary refill takes less than 2 seconds.   Neurological:      Mental Status: He is disoriented.        Current Meds   aspirin, 81 mg, oral, Daily  atorvastatin, 40 mg, oral, Nightly  [Held by provider] benztropine, 0.5 mg, oral, BID  carBAMazepine XR, 400 mg, oral, Nightly  cloZAPine, 25 mg, oral, BID  docusate sodium, 100 mg, oral, BID  enoxaparin, 40 mg, subcutaneous, q24h  ferrous sulfate (325 mg ferrous sulfate), 65 mg of iron, oral, Daily with breakfast  insulin glargine, 10 Units, subcutaneous, Nightly  insulin lispro, 0-10 Units, subcutaneous, q6h  insulin lispro, 18 Units, subcutaneous, TID AC  lisinopril, 20 mg, oral, Daily  LORazepam, 1 mg, intravenous, q6h YUNG  [Held by provider] metFORMIN, 1,000 mg, oral, BID  polyethylene glycol, 17 g, oral, Daily  propranolol, 10 mg, oral, TID  [Held by provider] thiamine, 100 mg, oral, Daily  thiamine, 500 mg, intravenous, Daily       PRN medications: dextrose,  dextrose, dextrose, dextrose, glucagon, glucagon, glucagon, glucagon, LORazepam, metoprolol     LABS and IMAGING     WBC   Date Value Ref Range Status   11/09/2024 5.1 4.4 - 11.3 x10*3/uL Final   11/08/2024 6.5 4.4 - 11.3 x10*3/uL Final   11/07/2024 8.1 4.4 - 11.3 x10*3/uL Final     Hemoglobin   Date Value Ref Range Status   11/09/2024 12.3 (L) 13.5 - 17.5 g/dL Final   11/08/2024 12.5 (L) 13.5 - 17.5 g/dL Final   11/07/2024 13.2 (L) 13.5 - 17.5 g/dL Final     Hematocrit   Date Value Ref Range Status   11/09/2024 39.1 (L) 41.0 - 52.0 % Final   11/08/2024 39.3 (L) 41.0 - 52.0 % Final   11/07/2024 40.9 (L) 41.0 - 52.0 % Final     Bicarbonate   Date Value Ref Range Status   11/09/2024 24 21 - 32 mmol/L Final   11/08/2024 20 (L) 21 - 32 mmol/L Final   11/07/2024 22 21 - 32 mmol/L Final     Creatinine   Date Value Ref Range Status   11/09/2024 0.83 0.50 - 1.30 mg/dL Final   11/08/2024 0.92 0.50 - 1.30 mg/dL Final   11/07/2024 1.02 0.50 - 1.30 mg/dL Final     Calcium   Date Value Ref Range Status   11/09/2024 8.7 8.6 - 10.6 mg/dL Final   11/08/2024 8.8 8.6 - 10.6 mg/dL Final   11/07/2024 8.6 8.6 - 10.6 mg/dL Final     INR   Date Value Ref Range Status   11/07/2024 1.1 0.9 - 1.1 Final       FL guided lumbar puncture  Narrative: Interpreted By:  Endy Boss,   STUDY:  FL GUIDED LUMBAR PUNCTURE;  11/8/2024 2:47 pm      INDICATION:  Signs/Symptoms:lumbar puncture-- several attempts in the ED.          COMPARISON:  None.      ACCESSION NUMBER(S):  [id number]      ORDERING CLINICIAN:  LUIS CORTÉS      TECHNIQUE:  After discussion of risks, benefits, and alternatives, oral and  written informed consent was obtained. The patient was placed in  prone position on exam table. The L3-4 interspace was then marked  under fluoroscopy. The patient was then prepped and draped in sterile  fashion. Local anesthesia was achieved with 2% Lidocaine solution. A  20 gauge spinal needle was then introduced at the L3-L4 level under  direct  fluoroscopic guidance until return of CSF was demonstrated. 10  ml of clear CSF was collected in 4 tubes. The stylet was then  replaced and the spinal needle was removed.  Hemostasis was achieved  with direct pressure and the area was dressed with a Band-Aid. The  patient tolerated procedure well without any immediate or clinically  apparent complications. Total fluoroscopy time was 0.5 MINUTES.      The collected CSF was then sent to the lab for appropriate lab tests  as ordered by the referring physician.      FINDINGS:  A single periprocedural spot fluoroscopic image was provided for  interpretation. Image quality is such that fine bony detail is  obscured. A needle is seen to overlie the posterior elements of L4.      Impression: Successful fluoroscopic guided lumbar puncture.      I personally reviewed the images/study and I agree with the findings  as stated by Endy Boss DO PGY-2. This study was interpreted at  [facility], [place], [place].      MACRO:  None          Dictation workstation:   [id number]         PROBLEM LISTS      Problem List Items Addressed This Visit          Neuro    * (Principal) Altered mental status, unspecified altered mental status type - Primary       This dictation was created with voice recognition software. Although every effort is made to review the dictation as it is transcribed, on occasion spoken words, phrases, names, numbers, punctuation etc can be misinterpreted by the the technology leading to omissions or inappropriate outcomes.     Geraldine Rojas MD

## 2024-11-10 NOTE — CARE PLAN
The patient's goals for the shift include      The clinical goals for the shift include Pt will remain free of falls and injury throughout the shift     Over the shift, the patient remained free of falls and injury throughout the shift. Pt in bed call light within reach. Pt denies pain at this time. Continues to refuse PO medications. Pt educated and continues to refuse, Md aware.

## 2024-11-10 NOTE — CARE PLAN
Pt was alert x4 and cooperative throughout the shift.   Sitter at bedside for safety.     Problem: Fall/Injury  Goal: Not fall by end of shift  Outcome: Progressing     Problem: Skin  Goal: Prevent/minimize sheer/friction injuries  Outcome: Progressing  Flowsheets (Taken 11/9/2024 2026)  Prevent/minimize sheer/friction injuries:   HOB 30 degrees or less   Turn/reposition every 2 hours/use positioning/transfer devices   Use pull sheet   Laura PALACIOS

## 2024-11-10 NOTE — PROGRESS NOTES
"Recreation Therapy Note    Therapy Session  Visit Type: New visit  Session Start Time: 1153  Session End Time: 1205  Intervention Delivery: In-person  Conflict of Service: None  Number of family members present: 0  Family Present for Session: None  Family Participation: None  Number of staff members present: 1    Pre-assessment  Mood/Affect: Confused, Cooperative  Verbalized Emotional State:  (none verbalized)    Treatment  Areas of Focus: Coping, Socialization, Cognitive functioning improvement, Stress reduction  Co-Treatment:  (none)  Interruption: No  Patient Fell Asleep at End of Session: No    Post-assessment  Mood/Affect: Confused  Verbalized Emotional State:  (none verbalized)  Continue Visiting: Yes  Total Session Time (min): 12 minutes    Narrative  Assessment Detail: Patient was resting in bed upon arrival.  Plan: To encourage the exploration of safe and effective positive leisure coping skills to manage situational stressors.  Intervention: Patient nodded his head in agreement when this author offered to play cards with him.  Evaluation: Patient was disorganized throughout the session.  Stated that he wanted to play a specific game called \"Tonk\" but then would not  the cards.  Stared at them for awhile then started spereading them around the table and moving them randomly.  Attempted to discern if it was a different game but unable to make out any pattern in the card movement.  Whispered under his breath at times. Overall pleasant.  Follow-up: Will continue to encourage the exploration of positive leisure coping skills.  Patient Comments: None noted.    "

## 2024-11-11 LAB
ALBUMIN SERPL BCP-MCNC: 3.5 G/DL (ref 3.4–5)
ALP SERPL-CCNC: 101 U/L (ref 33–136)
ALT SERPL W P-5'-P-CCNC: 31 U/L (ref 10–52)
ANION GAP SERPL CALC-SCNC: 14 MMOL/L (ref 10–20)
APPEARANCE UR: CLEAR
AST SERPL W P-5'-P-CCNC: 35 U/L (ref 9–39)
BILIRUB SERPL-MCNC: 0.3 MG/DL (ref 0–1.2)
BILIRUB UR STRIP.AUTO-MCNC: NEGATIVE MG/DL
BUN SERPL-MCNC: 13 MG/DL (ref 6–23)
CALCIUM SERPL-MCNC: 8.6 MG/DL (ref 8.6–10.6)
CHLORIDE SERPL-SCNC: 104 MMOL/L (ref 98–107)
CO2 SERPL-SCNC: 28 MMOL/L (ref 21–32)
COLOR UR: YELLOW
CREAT SERPL-MCNC: 0.84 MG/DL (ref 0.5–1.3)
EGFRCR SERPLBLD CKD-EPI 2021: >90 ML/MIN/1.73M*2
ERYTHROCYTE [DISTWIDTH] IN BLOOD BY AUTOMATED COUNT: 12.4 % (ref 11.5–14.5)
GLUCOSE BLD MANUAL STRIP-MCNC: 129 MG/DL (ref 74–99)
GLUCOSE BLD MANUAL STRIP-MCNC: 165 MG/DL (ref 74–99)
GLUCOSE BLD MANUAL STRIP-MCNC: 215 MG/DL (ref 74–99)
GLUCOSE BLD MANUAL STRIP-MCNC: 231 MG/DL (ref 74–99)
GLUCOSE BLD MANUAL STRIP-MCNC: 278 MG/DL (ref 74–99)
GLUCOSE SERPL-MCNC: 270 MG/DL (ref 74–99)
GLUCOSE UR STRIP.AUTO-MCNC: ABNORMAL MG/DL
HCT VFR BLD AUTO: 35.6 % (ref 41–52)
HGB BLD-MCNC: 12.1 G/DL (ref 13.5–17.5)
KETONES UR STRIP.AUTO-MCNC: ABNORMAL MG/DL
LEUKOCYTE ESTERASE UR QL STRIP.AUTO: NEGATIVE
MCH RBC QN AUTO: 31.5 PG (ref 26–34)
MCHC RBC AUTO-ENTMCNC: 34 G/DL (ref 32–36)
MCV RBC AUTO: 93 FL (ref 80–100)
MUCOUS THREADS #/AREA URNS AUTO: NORMAL /LPF
NITRITE UR QL STRIP.AUTO: NEGATIVE
NRBC BLD-RTO: 0 /100 WBCS (ref 0–0)
PH UR STRIP.AUTO: 6 [PH]
PLATELET # BLD AUTO: 175 X10*3/UL (ref 150–450)
POTASSIUM SERPL-SCNC: 3.8 MMOL/L (ref 3.5–5.3)
PROT SERPL-MCNC: 6.2 G/DL (ref 6.4–8.2)
PROT UR STRIP.AUTO-MCNC: ABNORMAL MG/DL
RBC # BLD AUTO: 3.84 X10*6/UL (ref 4.5–5.9)
RBC # UR STRIP.AUTO: NEGATIVE /UL
RBC #/AREA URNS AUTO: NORMAL /HPF
SODIUM SERPL-SCNC: 142 MMOL/L (ref 136–145)
SP GR UR STRIP.AUTO: 1.04
UROBILINOGEN UR STRIP.AUTO-MCNC: NORMAL MG/DL
WBC # BLD AUTO: 4.5 X10*3/UL (ref 4.4–11.3)
WBC #/AREA URNS AUTO: NORMAL /HPF

## 2024-11-11 PROCEDURE — 2500000004 HC RX 250 GENERAL PHARMACY W/ HCPCS (ALT 636 FOR OP/ED): Performed by: NURSE PRACTITIONER

## 2024-11-11 PROCEDURE — 99233 SBSQ HOSP IP/OBS HIGH 50: CPT | Performed by: PSYCHIATRY & NEUROLOGY

## 2024-11-11 PROCEDURE — 2500000002 HC RX 250 W HCPCS SELF ADMINISTERED DRUGS (ALT 637 FOR MEDICARE OP, ALT 636 FOR OP/ED): Performed by: STUDENT IN AN ORGANIZED HEALTH CARE EDUCATION/TRAINING PROGRAM

## 2024-11-11 PROCEDURE — 82947 ASSAY GLUCOSE BLOOD QUANT: CPT

## 2024-11-11 PROCEDURE — 1200000002 HC GENERAL ROOM WITH TELEMETRY DAILY

## 2024-11-11 PROCEDURE — 80053 COMPREHEN METABOLIC PANEL: CPT | Performed by: STUDENT IN AN ORGANIZED HEALTH CARE EDUCATION/TRAINING PROGRAM

## 2024-11-11 PROCEDURE — 81001 URINALYSIS AUTO W/SCOPE: CPT | Performed by: STUDENT IN AN ORGANIZED HEALTH CARE EDUCATION/TRAINING PROGRAM

## 2024-11-11 PROCEDURE — 36415 COLL VENOUS BLD VENIPUNCTURE: CPT | Performed by: STUDENT IN AN ORGANIZED HEALTH CARE EDUCATION/TRAINING PROGRAM

## 2024-11-11 PROCEDURE — 99232 SBSQ HOSP IP/OBS MODERATE 35: CPT | Performed by: STUDENT IN AN ORGANIZED HEALTH CARE EDUCATION/TRAINING PROGRAM

## 2024-11-11 PROCEDURE — 2500000004 HC RX 250 GENERAL PHARMACY W/ HCPCS (ALT 636 FOR OP/ED): Performed by: STUDENT IN AN ORGANIZED HEALTH CARE EDUCATION/TRAINING PROGRAM

## 2024-11-11 PROCEDURE — 85027 COMPLETE CBC AUTOMATED: CPT | Performed by: STUDENT IN AN ORGANIZED HEALTH CARE EDUCATION/TRAINING PROGRAM

## 2024-11-11 RX ORDER — CARBAMAZEPINE 200 MG/1
200 TABLET, EXTENDED RELEASE ORAL NIGHTLY
Status: DISCONTINUED | OUTPATIENT
Start: 2024-11-11 | End: 2024-11-12

## 2024-11-11 ASSESSMENT — COGNITIVE AND FUNCTIONAL STATUS - GENERAL
DRESSING REGULAR LOWER BODY CLOTHING: A LITTLE
DRESSING REGULAR LOWER BODY CLOTHING: A LITTLE
DAILY ACTIVITIY SCORE: 18
PERSONAL GROOMING: A LITTLE
MOVING TO AND FROM BED TO CHAIR: A LITTLE
DRESSING REGULAR UPPER BODY CLOTHING: A LITTLE
MOVING FROM LYING ON BACK TO SITTING ON SIDE OF FLAT BED WITH BEDRAILS: A LITTLE
TURNING FROM BACK TO SIDE WHILE IN FLAT BAD: A LITTLE
TOILETING: A LITTLE
DAILY ACTIVITIY SCORE: 19
PERSONAL GROOMING: A LITTLE
CLIMB 3 TO 5 STEPS WITH RAILING: TOTAL
EATING MEALS: A LITTLE
WALKING IN HOSPITAL ROOM: A LITTLE
HELP NEEDED FOR BATHING: A LITTLE
DRESSING REGULAR UPPER BODY CLOTHING: A LITTLE
STANDING UP FROM CHAIR USING ARMS: A LITTLE
TOILETING: A LITTLE
MOBILITY SCORE: 16
HELP NEEDED FOR BATHING: A LITTLE

## 2024-11-11 ASSESSMENT — PAIN SCALES - GENERAL
PAINLEVEL_OUTOF10: 0 - NO PAIN
PAINLEVEL_OUTOF10: 2

## 2024-11-11 ASSESSMENT — COLUMBIA-SUICIDE SEVERITY RATING SCALE - C-SSRS
2. HAVE YOU ACTUALLY HAD ANY THOUGHTS OF KILLING YOURSELF?: NO
1. SINCE LAST CONTACT, HAVE YOU WISHED YOU WERE DEAD OR WISHED YOU COULD GO TO SLEEP AND NOT WAKE UP?: NO
6. HAVE YOU EVER DONE ANYTHING, STARTED TO DO ANYTHING, OR PREPARED TO DO ANYTHING TO END YOUR LIFE?: NO

## 2024-11-11 ASSESSMENT — PAIN - FUNCTIONAL ASSESSMENT: PAIN_FUNCTIONAL_ASSESSMENT: 0-10

## 2024-11-11 NOTE — PROGRESS NOTES
SOCIAL WORK NOTE:     ANMOL was informed by nurse, Gila Lyon that the patient’s sister and guardian, Mehnaz Roach, needed to speak with [facility].     ANMOL contacted the sister, who shared that she is awaiting a callback from psychiatry. She needs to know her brother’s care plan, particularly regarding medications, before she can approve his discharge plan.     According to the sister, the patient has been diagnosed with schizophrenia and has a history of non-compliance with oral medications, which has led to him being discharged from multiple facilities. However, when prescribed injectable medications, he tends to have better health outcomes. The sister mentioned that she has contacted the nurse’s station on [facility] several times but has yet to speak with psychiatry regarding her concerns about his medication or receive an update on her brother’s mental status.    ANMOL listened and indicated to the sister that she would inform the medical team of her request. Additionally, the sister shared that she has been diagnosed with multiple sclerosis (MS), which limits her mobility. Despite this, she plans to tour a facility in [place] (name unknown, but one given by PITO) for her brother on Wednesday, 11/13/24, at 3:00 PM, with her daughter.    PITO/MAYITO to continue to follow.        ANMOL COOK

## 2024-11-11 NOTE — PROGRESS NOTES
Clayton Parra is a 59 y.o. y.o. male on day 5 of admission presenting with Altered mental status, unspecified altered mental status type [R41.82].     Subjective   Received update that patient is medically ready for discharge; patient continues to meet criteria for inpatient psychiatry. EPAT referral placed by primary MD this morning. Requested that psychiatry update patients sister/legal guardian on current plan of care and recommendation for inpatient psychiatry.    TCC/SW will continue to follow.    - Halina QUIJANO, MA, LSW  Care Transitions   [facility] Secure Chat or [phone]

## 2024-11-11 NOTE — PROGRESS NOTES
Occupational Therapy    Evaluation    Patient Name: Clayton Parra  MRN: [id number]  Department: [facility]  Room: [address] [place] [address]  Today's Date: 11/11/2024  Time Calculation  Start Time: 1236  Stop Time: 1255  Time Calculation (min): 19 min    Assessment  IP OT Assessment  Prognosis: Good  End of Session Communication: Bedside nurse  End of Session Patient Position: Up in chair  Plan:  Treatment Interventions: ADL retraining, Functional transfer training, UE strengthening/ROM, Endurance training  OT Frequency: 2 times per week  OT Discharge Recommendations:  (pt may benefit from low intensity level of therapy for safety with ADL's and functional mobility)  OT - OK to Discharge: Yes    Subjective   Current Problem:  1. Altered mental status, unspecified altered mental status type  EEG        General:  General  Reason for Referral: encephalopathy, catatonia  Past Medical History Relevant to Rehab: DM, schizophrenia, anemia, HTN  Family/Caregiver Present:  (sitter present)  Prior to Session Communication: Bedside nurse  Patient Position Received: Up in chair  Preferred Learning Style: verbal  General Comment: Pt seated up in chair upon OT entry, sometimes difficult to understand speech/ is incoherent/intelligable.  Cooperative with mobility assessment.Will continue to follow.  Precautions:  Hearing/Visual Limitations: appears WFL  Medical Precautions: Fall precautions    Vital Sign (Past 2hrs)                Pain:  Pain Assessment  Pain Assessment: 0-10  0-10 (Numeric) Pain Score: 2  Pain Location: Ankle    Objective   Cognition:  Orientation Level: Oriented X4  Following Commands: Follows one step commands without difficulty  Cognition Comments: somewhat flat affect with minimal eye contact though does smile occasionally during session.  Safety/Judgement: Exceptions to WFL           Home Living:  Type of Home:  (admitted from LTC facility)  Lives With:  (facility staff)   Prior Function:  Receives Help From:  (LTC  "facility staff)  ADL Assistance:  (anticipate may need (S) for safety at baseline)  Homemaking Assistance: Needs assistance  Ambulatory Assistance: Independent  Hand Dominance:  (pt reports \"both\")  IADL History:  Homemaking Responsibilities: No  ADL:  LE Dressing Assistance: Stand by  ADL Comments: anticipate SBA for safety  Activity Tolerance:  Endurance: Tolerates 10 - 20 min exercise with multiple rests  Bed Mobility/Transfers: Bed Mobility  Bed Mobility: Yes  Bed Mobility 1  Bed Mobility 1: Sitting to supine, Supine to sitting  Level of Assistance 1: Distant supervision    Transfers  Transfer: Yes  Transfer 1  Transfer From 1: Bed to  Transfer to 1: Stand, Chair with arms  Transfer Level of Assistance 1: Hand held assistance, Contact guard, Close supervision, Moderate verbal cues  Transfers 2  Transfer From 2: Sit to, Stand to  Transfer to 2: Chair with arms  Technique 2: Sit to stand, Stand to sit      Functional Mobility:  Functional Mobility  Functional Mobility Performed: Yes  Sitting Balance:  Static Sitting Balance  Static Sitting-Balance Support: Feet supported  Static Sitting-Level of Assistance: Close supervision  Dynamic Sitting Balance  Dynamic Sitting-Balance Support: Feet supported, Bilateral upper extremity supported  Dynamic Sitting-Level of Assistance: Close supervision  Standing Balance:  Static Standing Balance  Static Standing-Balance Support: No upper extremity supported  Static Standing-Level of Assistance: Contact guard  Dynamic Standing Balance  Dynamic Standing-Balance Support: No upper extremity supported  Dynamic Standing-Level of Assistance: Contact guard  Modalities:     IADL's:   Homemaking Responsibilities: No  Vision: Vision - Basic Assessment  Current Vision: Does not wear glasses (pt reports that he has a previous injury to his eyes from the sun)   and    Sensation:  Light Touch: No apparent deficits  Strength:  Strength Comments: BUE grasp " 3+/5  Perception:  Inattention/Neglect: Appears intact  Initiation: Cues to initiate tasks  Motor Planning: Appears intact  Perseveration: Not present  Coordination:  Movements are Fluid and Coordinated: Yes   Hand Function:  Hand Function  Gross Grasp: Functional  Coordination: Functional  Extremities: RUE   RUE : Within Functional Limits, LUE   LUE: Within Functional Limits,  , and    Cervical Spine    Functional Rating Scale     Observation     C-Spine Palpation/Joint Mobility Assessment      Cervical AROM     Shoulder AROM     Cervical Strength     Myotomes (MMT)     Scapular (MMT)     DTR     Dermatomes     Special Tests         , Lumbar Spine    Functional Rating Scale     Observation     Lumbar Palpation/Joint Mobility Assessment     Lumbar AROM     Hip AROM     Lumbar Myotomes     Specific Lower Extremity MMT     DTR     Dermatomes     Special Tests              , Shoulder    Functional Rating Scale     Observation     Shoulder palpation/Joint Assessment     Cervical AROM     Shoulder AROM     Shoulder PROM     Cervical Myotomes (MMT     Shoulder Strength     Scapular MMT     DTR      Shoulder Special         , Elbow    Functional Rating Scale     Observation     Elbow Palpation/Joint Mobility Assessment     Elbow AROM     Elbow PROM      Elbow Strength      Strength      DTR      Elbow Special Tests    , Wrist    Functional Rating Scale     Observation     Wrist Palpation/Joint Mobility Assessment     Wrist AROM     Wrist PROM     Wrist (MMT)      Strength     DTR     Wrist Special Tests        ,   Hand Assessment    Right Hand AROM:     Right Hand PROM:      Right Hand Strength - :     Right Hand Strength - Pinch:      Right Upper Extremity Edema - Circumference:     Left Hand AROM:      Left Hand PROM:     Left Hand Strength - :     Left Hand Strength - Pinch:     Left Upper Extremity Edema - Circumference:     Rapid Exchange :     Hand Edema - Water Displacement:     ,  "HIP    Functional Rating Scale     Observation     Hip Palpation/Joint Mobility      Lumbar AROM     Hip AROM     Hip PROM     Specific Lower Extremity MMT     DTR     Special Tests     Gait     Flexibility             , KNEE    Functional Rating Scale     Observation     Knee Palpation/Joint Mobility     Knee AROM     Knee PROM     Knee MMT     DTR     Special Tests     Gait     Flexibility                 , ANKLE    Functional Rating Scale     Observation     Ankle Palpation/Joint Mobility Assessment     Ankle AROM     Ankle PROM     Ankle MMT     Special Tests     Joint Mobility Testing     Gait     Flexibility     , and   Lymphedema Assessment    Lymphedema Assessments:     Right Upper Extremity:     Left Upper Extremity:     UE Skin Appearance/Condition and Girth:     Upper Extremity Evaluation:     Right Lower Extremity:     Left Lower Extremity:     LE Skin Appearance/Condition and Girth:     Lower Extremity Evaluation:     Head and Neck Measurements:     Head and Neck Appearance:     Trunk Skin Appearance/Condition and Girth:      Genital Skin Appearance/Condition and Girth:     Home Living:     ADL:  LE Dressing Assistance: Stand by  ADL Comments: anticipate SBA for safety  Prior Function:  Receives Help From:  (LTC facility staff)  ADL Assistance:  (anticipate may need (S) for safety at baseline)  Homemaking Assistance: Needs assistance  Ambulatory Assistance: Independent  Hand Dominance:  (pt reports \"both\")  Pain Assessment:  Pain Assessment: 0-10  0-10 (Numeric) Pain Score: 2  Pain Location: Ankle  Therapy Precautions:  Hearing/Visual Limitations: appears WFL  Medical Precautions: Fall precautions    Hand Assessment    Right Hand AROM:     Right Hand PROM:      Right Hand Strength - :     Right Hand Strength - Pinch:      Right Upper Extremity Edema - Circumference:     Left Hand AROM:      Left Hand PROM:     Left Hand Strength - :     Left Hand Strength - Pinch:     Left Upper Extremity Edema " "- Circumference:     Rapid Exchange :     Hand Edema - Water Displacement:     ,   Lymphedema Assessment    Lymphedema Assessments:     Right Upper Extremity:     Left Upper Extremity:     UE Skin Appearance/Condition and Girth:     Upper Extremity Evaluation:     Right Lower Extremity:     Left Lower Extremity:     LE Skin Appearance/Condition and Girth:     Lower Extremity Evaluation:     Head and Neck Measurements:     Head and Neck Appearance:     Trunk Skin Appearance/Condition and Girth:      Genital Skin Appearance/Condition and Girth:     Home Living:     ADL:  LE Dressing Assistance: Stand by  ADL Comments: anticipate SBA for safety  Prior Function:  Receives Help From:  (LTC facility staff)  ADL Assistance:  (anticipate may need (S) for safety at baseline)  Homemaking Assistance: Needs assistance  Ambulatory Assistance: Independent  Hand Dominance:  (pt reports \"both\")  Pain Assessment:  Pain Assessment: 0-10  0-10 (Numeric) Pain Score: 2  Pain Location: Ankle  Therapy Precautions:  Hearing/Visual Limitations: appears WFL  Medical Precautions: Fall precautions      , and    Neuro Evaluation    Patient Name: Clayton Parra  MRN: [id number]  Department: [place] [address]  Room: [address] [place] [address]  Today's Date: 11/11/24    Assessment:   PT Assessment Results: Decreased mobility, Decreased safety awareness, Decreased cognition  Rehab Prognosis: Good  Assessment Comment: Pt mobilizing well, per nursing was able to walk to and from avery earlier this date with (S), completes activity this session with CGA-> (S).  Will benefit from continued skilled PT to progress towards baseline (I) mobility.    Plan:       Current Problem:   1. Altered mental status, unspecified altered mental status type  EEG          Subjective   General Visit Information:     HPI:   Precautions:  Precautions  Hearing/Visual Limitations: appears WFL  Medical Precautions: Fall precautions  Vital Signs:     Pain:  Pain Assessment  Pain Assessment: " "0-10  0-10 (Numeric) Pain Score: 2  Pain Location: Ankle  Home Living:  Type of Home:  (admitted from LTC facility)  Lives With:  (facility staff)  Prior Level of Function:  Receives Help From:  (LTC facility staff)  ADL Assistance:  (anticipate may need (S) for safety at baseline)  Homemaking Assistance: Needs assistance  Ambulatory Assistance: Independent  Hand Dominance:  (pt reports \"both\")    Objective   Cognition:  Arousal/Alertness: Delayed responses to stimuli  Orientation Level: Oriented X4  Following Commands: Follows one step commands without difficulty  Cognition Comments: somewhat flat affect with minimal eye contact though does smile occasionally during session.  Safety/Judgement: Exceptions to WFL  Concussion VOMS:     Observation:     Palpation:     Range of Motion:     Strength:  Strength Comments: BUE grasp 3+/5  Neuro Sensation:     Perception:  Inattention/Neglect: Appears intact  Initiation: Cues to initiate tasks  Motor Planning: Appears intact  Perseveration: Not present  Coordination:  Movements are Fluid and Coordinated: Yes  Neuro Tremor:     Neuro Tone:     Neuro Motor Control:     Neuro Isolated Movements:     Neuro Oculomotor:     Neuro Vestibular:     Positional Testing:     Special Tests:     Postural Control:  Postural Control: Within Functional Limits  Balance:      ,      ,    , and      Transfer/Mobility Assessment: Transfer 1  Transfer From 1: Bed to  Transfer to 1: Stand, Chair with arms  Transfer Level of Assistance 1: Hand held assistance, Contact guard, Close supervision, Moderate verbal cues    , ,    ,    ,    ,    ,    ,    ,    ,    ,    ,    ,    ,      ,    ,    , and      Extremities Assessment: RUE   RUE : Within Functional Limits, LUE   LUE: Within Functional Limits,  , and      Outcome Measures:  [facility] Daily Activity  Putting on and taking off regular lower body clothing: A little  Bathing (including washing, rinsing, drying): A little  Putting on and taking off " regular upper body clothing: A little  Toileting, which includes using toilet, bedpan or urinal: A little  Taking care of personal grooming such as brushing teeth: A little  Eating Meals: A little  Daily Activity - Total Score: 18                                                                                                         Education Documentation  No documentation found.  Education Comments  No comments found.      OP EDUCATION:       Goals:  Encounter Problems       Encounter Problems (Active)       ADLs       Patient with complete upper body dressing with stand by assist level of assistance donning and doffing all UE clothes with no adaptive equipment while edge of bed        Start:  11/11/24    Expected End:  11/26/24            Patient with complete lower body dressing with stand by assist level of assistance donning and doffing all LE clothes  with no adaptive equipment while edge of bed        Start:  11/11/24    Expected End:  11/26/24            Patient will complete daily grooming tasks brushing teeth/washing face and hands with stand by assist level of assistance and PRN adaptive equipment while standing at the sink.       Start:  11/11/24    Expected End:  11/26/24            Patient will complete toileting including hygiene clothing management/hygiene with stand by assist level of assistance and grab bars.       Start:  11/11/24    Expected End:  11/26/24               COGNITION/SAFETY       Patient will follow 75% Simple commands to allow improved ADL performance.       Start:  11/11/24    Expected End:  11/26/24               MOBILITY       Patient will perform Functional mobility min Household distances/Community Distances with stand by assist level of assistance and no device in order to improve safety and functional mobility.       Start:  11/11/24    Expected End:  11/26/24               TRANSFERS       Patient will complete functional transfer to restroom with least restrictive device  and  with stand by assist level of assistance.       Start:  11/11/24    Expected End:  11/26/24                 Outcome Measures: [facility] Daily Activity  Putting on and taking off regular lower body clothing: A little  Bathing (including washing, rinsing, drying): A little  Putting on and taking off regular upper body clothing: A little  Toileting, which includes using toilet, bedpan or urinal: A little  Taking care of personal grooming such as brushing teeth: A little  Eating Meals: A little  Daily Activity - Total Score: 18        ,    ,    ,      ,  ,    ,      ,      ,     ,  ,      ,    ,    ,    ,    ,  ,    , and    Education Documentation  No documentation found.  Education Comments  No comments found.      Goals:   Encounter Problems       Encounter Problems (Active)       ADLs       Patient with complete upper body dressing with stand by assist level of assistance donning and doffing all UE clothes with no adaptive equipment while edge of bed        Start:  11/11/24    Expected End:  11/26/24            Patient with complete lower body dressing with stand by assist level of assistance donning and doffing all LE clothes  with no adaptive equipment while edge of bed        Start:  11/11/24    Expected End:  11/26/24            Patient will complete daily grooming tasks brushing teeth/washing face and hands with stand by assist level of assistance and PRN adaptive equipment while standing at the sink.       Start:  11/11/24    Expected End:  11/26/24            Patient will complete toileting including hygiene clothing management/hygiene with stand by assist level of assistance and grab bars.       Start:  11/11/24    Expected End:  11/26/24               COGNITION/SAFETY       Patient will follow 75% Simple commands to allow improved ADL performance.       Start:  11/11/24    Expected End:  11/26/24               MOBILITY       Patient will perform Functional mobility min Household distances/Community  Distances with stand by assist level of assistance and no device in order to improve safety and functional mobility.       Start:  11/11/24    Expected End:  11/26/24               TRANSFERS       Patient will complete functional transfer to restroom with least restrictive device and  with stand by assist level of assistance.       Start:  11/11/24    Expected End:  11/26/24

## 2024-11-11 NOTE — CARE PLAN
The patient's goals for the shift include      The clinical goals for the shift include Pt will remain free of falls and injury throughout the shift     Over the shift, the patient remained free of falls and injury. Pt observer at bedside. Pt sitting in chair at this time, denies pain. Call light within reach, pt wearing non skin footwear. Pt continues to refuse all PO meds, Md notified.

## 2024-11-11 NOTE — SIGNIFICANT EVENT
Called patient's sister.  Updated patient's sister on recommendation for inpatient psychiatry.  Spent significant amount of time explaining what inpatient psychiatry was as comparison to a skilled nursing facility. The writer explained that this is necessary for him to get his medications properly adjusted.  The patient's sister had concerns regarding the patient being on Haldol decanoate and him being taken off this as she feels he does better on this.  The writer stated that from our perspective we would continue this but once he gets to an inpatient psychiatric hospital control and adjustments of these medications would be under there remit.  The patient's sister had concerns that some of the information may be lost.  The writer assured her that she could of course reach out to the hospital once he is accepted to discuss with the psychiatrist there regarding his care. We will make sure that all documentation and records are furnished to them once he goes.  All questions were answered to the sister's satisfaction.

## 2024-11-11 NOTE — CARE PLAN
Pt was sleepy most of the shift. He was alrt and oriented x4 for RN.   Pt Refused his medication.   Also refused his dinner tray.    No sitter at bedside, bed alarm on, pt education on the risk pf falling/. Call light within reach and bed at lowest position/     Laura PALACIOS    Problem: Fall/Injury  Goal: Be free from injury by end of the shift  11/10/2024 2050 by Laura Falcon RN  Outcome: Progressing  11/10/2024 2003 by Laura Falcon RN  Outcome: Progressing     Problem: Skin  Goal: Prevent/manage excess moisture  11/10/2024 2050 by Laura Falcon RN  Outcome: Progressing  11/10/2024 2003 by Laura Falcon RN  Outcome: Progressing  Flowsheets (Taken 11/10/2024 2003)  Prevent/manage excess moisture:   Cleanse incontinence/protect with barrier cream   Moisturize dry skin   Use wicking fabric (obtain order)

## 2024-11-11 NOTE — PROGRESS NOTES
Clayton Parra is a 59 y.o. male on hospital day 5 of admission presenting with Altered mental status, unspecified altered mental status type.    Subjective   Patient was seen this morning bedside with medical student.  When asked how he is feeling he states he is glad to be alive.  When asked how his mood is he states it is wonderful.  When asked regarding depression or anxiety he denies any symptoms.  When asked regarding SI or HI AVH he denies any symptoms.  He did then state to the writer he was looking at mental health recovery and that is why he is in the hospital.  When asked what that meant he would not really elaborate.    Additional Information:   Pt was sleepy most of the shift. He was alrt and oriented x4 for RN.   Pt Refused his medication.   Also refused his dinner tray.    No sitter at bedside, bed alarm on, pt education on the risk pf falling/. Call light within reach and bed at lowest position/       Objective   Upon physical examination there was a mild tremor present bilaterally.  There was no significant rigidity in the upper limbs present.    Vital Signs:      11/9/2024     3:53 PM 11/9/2024    11:00 PM 11/10/2024     4:33 AM 11/10/2024     7:44 AM 11/10/2024     3:54 PM 11/10/2024    11:00 PM 11/11/2024     7:40 AM   Vitals   Systolic 164 154 129 149 112 128 149   Diastolic 77 82 69 86 80 68 82   Heart Rate 79 76 63 77 85 87 88   Temp 36.4 °C (97.5 °F) 36.8 °C (98.2 °F) 36.4 °C (97.5 °F) 36.6 °C (97.9 °F) 36.5 °C (97.7 °F) 36 °C (96.8 °F) 36.4 °C (97.5 °F)   Resp 18 18 18 18 18 18       Intake/Output last 3 Shifts:  I/O last 3 completed shifts:  In: - (0 mL/kg)   Out: 700 (7 mL/kg) [Urine:700 (0.2 mL/kg/hr)]  Weight: 99.8 kg     Mental Status Exam:  General/Appearance: Moderate Psychological Distress, appears younger than stated age, in hospital gown, sitting on bed , body habitus: overweight, grooming/hygiene is reasonable for setting, bald, IV in place  Attitude/Behavior: engages in  "interview, cooperative, slightly suspicious , appropriate eye contact, calm, mildly guarded  Motor Activity: no psychomotor agitation/retardation, tics/tremors - present in upper limbs , gait: not assessed  Mood: \"good\"  Affect: Quality-mood congruent, Intensity-energized, Range-expansive  Speech:  fast rate, normal rhythm, normal tone, slightly raised volume, fast prosody  Thought Process: linear, circumstantial, organized, concrete  Thought Content: denies SI/HI, Delusional thinking: none elicited  Thought Perception: denies AVH  Cognition: alert & oriented x 3  Insight: fair  Judgment: fair    Current Medications  Scheduled   aspirin, 81 mg, oral, Daily  atorvastatin, 40 mg, oral, Nightly  [Held by provider] benztropine, 0.5 mg, oral, BID  carBAMazepine XR, 400 mg, oral, Nightly  cloZAPine, 25 mg, oral, BID  docusate sodium, 100 mg, oral, BID  enoxaparin, 40 mg, subcutaneous, q24h  ferrous sulfate (325 mg ferrous sulfate), 65 mg of iron, oral, Daily with breakfast  insulin glargine, 15 Units, subcutaneous, Nightly  insulin lispro, 0-10 Units, subcutaneous, q6h  insulin lispro, 18 Units, subcutaneous, TID AC  lisinopril, 20 mg, oral, Daily  LORazepam, 1 mg, intravenous, q6h YUNG  [Held by provider] metFORMIN, 1,000 mg, oral, BID  polyethylene glycol, 17 g, oral, Daily  propranolol, 10 mg, oral, TID  thiamine, 100 mg, oral, Daily  thiamine, 500 mg, intravenous, Daily      Continuous      PRN   PRN medications: dextrose, dextrose, dextrose, dextrose, glucagon, glucagon, glucagon, glucagon, LORazepam, metoprolol     Labs  Results for orders placed or performed during the hospital encounter of 11/06/24 (from the past 24 hours)   POCT GLUCOSE   Result Value Ref Range    POCT Glucose 267 (H) 74 - 99 mg/dL   POCT GLUCOSE   Result Value Ref Range    POCT Glucose 106 (H) 74 - 99 mg/dL   POCT GLUCOSE   Result Value Ref Range    POCT Glucose 177 (H) 74 - 99 mg/dL   Comprehensive metabolic panel   Result Value Ref Range    " Glucose 270 (H) 74 - 99 mg/dL    Sodium 142 136 - 145 mmol/L    Potassium 3.8 3.5 - 5.3 mmol/L    Chloride 104 98 - 107 mmol/L    Bicarbonate 28 21 - 32 mmol/L    Anion Gap 14 10 - 20 mmol/L    Urea Nitrogen 13 6 - 23 mg/dL    Creatinine 0.84 0.50 - 1.30 mg/dL    eGFR >90 >60 mL/min/1.73m*2    Calcium 8.6 8.6 - 10.6 mg/dL    Albumin 3.5 3.4 - 5.0 g/dL    Alkaline Phosphatase 101 33 - 136 U/L    Total Protein 6.2 (L) 6.4 - 8.2 g/dL    AST 35 9 - 39 U/L    Bilirubin, Total 0.3 0.0 - 1.2 mg/dL    ALT 31 10 - 52 U/L   CBC   Result Value Ref Range    WBC 4.5 4.4 - 11.3 x10*3/uL    nRBC 0.0 0.0 - 0.0 /100 WBCs    RBC 3.84 (L) 4.50 - 5.90 x10*6/uL    Hemoglobin 12.1 (L) 13.5 - 17.5 g/dL    Hematocrit 35.6 (L) 41.0 - 52.0 %    MCV 93 80 - 100 fL    MCH 31.5 26.0 - 34.0 pg    MCHC 34.0 32.0 - 36.0 g/dL    RDW 12.4 11.5 - 14.5 %    Platelets 175 150 - 450 x10*3/uL   POCT GLUCOSE   Result Value Ref Range    POCT Glucose 278 (H) 74 - 99 mg/dL        Imaging  No results found.     Psychiatric Risk Assessment  Acute Risk of Harm to Others is Considered: Low  Acute Risk of Harm to Self is Considered: Moderate    Assessment:  59M history of schizophrenia (bipolar?) and diabetes, who returned to ED from inpatient psych for medical exam due to worsening alterations in mentation, refusal to take PO, and unstable vital signs. Psychiatry was consulted on 11/6 for psychiatric evaluation. His outpatient medication regimen appears to be benztropine 0.5 mg BID, carbamazepine 400 mg nightly, clozapine 500 mg nightly, propranolol 10 mg TID.     11/11: Patient seen this morning on rounds.  Overall seems to be doing well.  Notable improvement from previous days where he was noted to be quite rigid, on physical exam tody rigidty was mostly gone from upper extremities. Did have BL upper fine tremor present.  He was able to have significant conversation with the writer and he seems to be doing well.  He did have mildly pressured speech throughout  the conversation.  Likely relating to his underlying mental illness and the fact his medications are not at optimal dosages at this time.  We continue current clozapine dosage given that he has not been taking it orally, would like to advance dosage on this.  We will continue with the Ativan at this time given clozapine needs to be retitrated and it has significant GABAergic effects which hopefully should supplant the need for Ativan. In terms of the Carbamazepine, there does not seem to be a reason for using this medication, there is no documented HX of seizures and the patient does not have schizoaffective DO. Given its multiple interactions with other drugs including clozapine and its risk for hyponatremia and aplastic anemia when combined with clozapine we will decrease it to 200mg PO at bedtime with a view to DC this. Hope this will occur once the patient goes to inpatient Psych    Continues to meet criteria for inpatient psychiatry given the help poorly he was doing previously and the fact none of his medications are at appropriate dosages.    Impression:  Catatonia   Schizophrenia    Recommendations:  Safety/Monitoring:  Patient meets criteria for inpatient psychiatric admission  Patient does not require 1:1 observation from a psychiatric perspective, defer to primary team  Daily interdisciplinary safety and planning huddle with Psychiatry  Video monitoring as with all patients on the MPU  Psychiatry will follow patient daily while on the MPU    Medications:  - Continue clozapine at 25 mg BID, can use ODT given issues with PO (must use clozapine order panel)    - Discontinue cogentin   - Decrease carbamazepine to 200mg PO QHS  - continue scheduled ativan 1 mg q6h   - Continue Bowel Regimen    Considerations:  Weekly ANC given he is on clozapine  Weekly Troponin's    Therapies recommended:  art therapy, music therapy, pet therapy, recreational therapy, and will continue to assess for benefit and meaningful  participation    Delirium Guidelines  Provide glasses, hearing aids and/or communication boards as needed for impairments  Frequent reorientation, minimize room and staff changes  Open blinds during the day, dark/quiet room at night   Minimal interruptions and daytime naps  Early evaluation and intervention by PT, out of bed as tolerated  Minimize use of restraints   Minimize use of benzodiazepines, anticholinergic medications, and opiates (while ensuring adequate treatment of pain)  Keep Mg>2, K>4 (as able)  Ensure regular bowel and bladder function (as able)    Disposition/Discharge Planning:  SW following, appreciate assistance      Multidisciplinary Rounding  Consulting Physician, Fellow, Medical Student, Charge Nurse, Nurse, Pharmacist, Social Work, Care Coordinator, Dietician, Music Therapy, Art Therapy, Recreational Therapy, and     Medication Consent  N/A - Consult Service    Michael Israel MD   PGY-5 consult liaison psychiatry fellow

## 2024-11-11 NOTE — PROGRESS NOTES
.                                                                                                                                                                                                                                                                                             INTERNAL MEDICINE PROGRESS NOTE     BRIEF NARRATIVE      Clayton Parra is a 59 y.o. male on day 5 of admission presenting with Altered mental status, unspecified altered mental status type.    Pt with significant psychiatric history including schizophrenia/schizoaffective/bipolar with apparent home medications ( haldol IV 100mg monthly, carbamazepine 400 mg qhs, clozapine 500 mg qhs,  benztropine 0.5 mg BID, propanolol 10mg TID), T2DM  HLD, anemia, HTN, who originally presented to the ED a couple days ago from [facility] for refusing his psych medications.  Was deemed to not have capacity and sent to [facility] for psych admission.  Upon arrival at [facility] patient was found to be alert but nonverbal and not oriented to self place or time.  On arrival to the ED he was found to be diaphoretic rigid.  At the time of this exam he was again diaphoretic and rigid nonverbal his eyes were open but he did not respond to questions and he responded very lightly to noxious stimuli.     While in the ED his vitals were stable.  They attempted an LP unsuccessfully.  His labs were WNL.  CT C/A/P and CT of the head were negative for any acute issues CXR was also negative.  Patient was admitted to MPU for  encephalopathy       ASSESSMENT / PLANS      > Catatonia   > Possible NMS. Less likely   > Acute encephalopathy, multifactorial, psychosis v toxic v infectious   > H/o Schizophrenia   > Xanthochromic CSF without evidence of SAH   Etiology of a catatonia is most likely secondary to antipsychotic. However differential includes possible NMS v serotonin syndrome (tachycardia, hypertension, rigidity, mixed clonus and hyperreflexia),  meningitis/ encephalitis less likely (negative white count, bcx neg, nuchal rigidity noted but  Kernig and Brudzinski signs negative).   CT head on 11/6 negative for intracranial hemorrhage   - S/p Acyclovir, ampicillin, ceftriaxone and vancomycin in the ED for empiric meningitis coverage. Bcx, WBC negative, afebrile, procal neg. No infectious source identified so far, Will not initiate abx at the moment.    - LP 11/8 : Neutrophils 42, protein 83, glucose 151, RBC 3000.   - Psych following   - Pt currently meet inpatient psych admission   - Patient is medically clear for impatient psych admission   - Per psych recs, will cont  IV lorazepam 1mg QID (Transitioned to po 11/9)   - Stopped Depacon 250mg and started IV Ativan 1mg TID prn for agitation. Will transition to po when take po meds uninterrupted for 24h   - Restarted carbamazepine 11/7, might decreased dose or stop per psych   - Restated propanolol 10mg TID 11/8   - Restated clozapine 11/8 per reinitiation protocol   - Cont to hold benztropine ? Psych to reevaluate   - Pt is on IV Haldol Dec 200mg IM q28 days last given 10/26/24. Cont   - Routine EEG results pending   - TSH, b12, GGT and folate wnl,  thiamine level pending   - Patient is on thiamine 100mg daily at home for vit deficiency and Wernicke ppx, will load with IV thiamine for 3 days. Will resume home dose afterward    - EKG on 11/8 noted normal Qtc, obtain EKG q72 when on antipsychotics  - Cont fall precaution   - 1:1 sitter  as needed   - Discussed with patient sister (the guardian) on 11/8 and updated. At baseline patient does ambulate. Sister requested placement to a different facility   - PT/OT      > T2DM  - Hold home metformin  - Home insulin regimen: Lantus 70 units nightly, Humalog 18 units 3 times daily with meals  - BG fluctuating between 127- 262. For now will continue Lantus 10u nightly , Lispro 18u TID with meals and #1 SSI. Goal -180   - Cont accucheck      > HTN  > HLD  -Will hold  home aspirin and atorvastatin and lisinopril for now until patient is able to take p.o.  - Cont IV lopressor 5mg for SBP > 160  DBP > 110      > Anemia  -Hgb stable  -Will hold home iron     Fluids: monitor and replete as needed  Electrolytes: monitor and replete as needed  Nutrition: Regular diet   GI prophylaxis: as needed  DVT prophylaxis: SCD  Code Status: full code    SUBJECTIVE       Patient seen and examined today, looks much much better compared to the day before.  Patient appeared to be close to his baseline now.    OBJECTIVE      Visit Vitals  /82   Pulse 88   Temp 36.4 °C (97.5 °F)   Resp 18        Intake/Output Summary (Last 24 hours) at 11/11/2024 0759  Last data filed at 11/10/2024 2300  Gross per 24 hour   Intake --   Output 700 ml   Net -700 ml         Physical Exam  Constitutional:       Appearance: He is ill-appearing and toxic-appearing.   HENT:      Head: Normocephalic and atraumatic.      Right Ear: Tympanic membrane normal.      Left Ear: Tympanic membrane normal.   Eyes:      Pupils: Pupils are equal, round, and reactive to light.   Cardiovascular:      Rate and Rhythm: Tachycardia present.   Abdominal:      Palpations: Abdomen is soft.   Musculoskeletal:         General: Normal range of motion.      Cervical back: Rigidity present.   Skin:     Capillary Refill: Capillary refill takes less than 2 seconds.   Neurological:      Mental Status: He is disoriented.        Current Meds   aspirin, 81 mg, oral, Daily  atorvastatin, 40 mg, oral, Nightly  [Held by provider] benztropine, 0.5 mg, oral, BID  carBAMazepine XR, 400 mg, oral, Nightly  cloZAPine, 25 mg, oral, BID  docusate sodium, 100 mg, oral, BID  enoxaparin, 40 mg, subcutaneous, q24h  ferrous sulfate (325 mg ferrous sulfate), 65 mg of iron, oral, Daily with breakfast  insulin glargine, 15 Units, subcutaneous, Nightly  insulin lispro, 0-10 Units, subcutaneous, q6h  insulin lispro, 18 Units, subcutaneous, TID AC  lisinopril, 20 mg, oral,  Daily  LORazepam, 1 mg, intravenous, q6h YUNG  [Held by provider] metFORMIN, 1,000 mg, oral, BID  polyethylene glycol, 17 g, oral, Daily  propranolol, 10 mg, oral, TID  [Held by provider] thiamine, 100 mg, oral, Daily  thiamine, 500 mg, intravenous, Daily       PRN medications: dextrose, dextrose, dextrose, dextrose, glucagon, glucagon, glucagon, glucagon, LORazepam, metoprolol     LABS and IMAGING     WBC   Date Value Ref Range Status   11/10/2024 5.5 4.4 - 11.3 x10*3/uL Final   11/09/2024 5.1 4.4 - 11.3 x10*3/uL Final     Hemoglobin   Date Value Ref Range Status   11/10/2024 12.7 (L) 13.5 - 17.5 g/dL Final   11/09/2024 12.3 (L) 13.5 - 17.5 g/dL Final     Hematocrit   Date Value Ref Range Status   11/10/2024 38.7 (L) 41.0 - 52.0 % Final   11/09/2024 39.1 (L) 41.0 - 52.0 % Final     Bicarbonate   Date Value Ref Range Status   11/10/2024 26 21 - 32 mmol/L Final   11/09/2024 24 21 - 32 mmol/L Final     Creatinine   Date Value Ref Range Status   11/10/2024 0.93 0.50 - 1.30 mg/dL Final   11/09/2024 0.83 0.50 - 1.30 mg/dL Final     Calcium   Date Value Ref Range Status   11/10/2024 8.8 8.6 - 10.6 mg/dL Final   11/09/2024 8.7 8.6 - 10.6 mg/dL Final       FL guided lumbar puncture  Narrative: Interpreted By:  Endy Boss,   STUDY:  FL GUIDED LUMBAR PUNCTURE;  11/8/2024 2:47 pm      INDICATION:  Signs/Symptoms:lumbar puncture-- several attempts in the ED.          COMPARISON:  None.      ACCESSION NUMBER(S):  [id number]      ORDERING CLINICIAN:  LUIS CORTÉS      TECHNIQUE:  After discussion of risks, benefits, and alternatives, oral and  written informed consent was obtained. The patient was placed in  prone position on exam table. The L3-4 interspace was then marked  under fluoroscopy. The patient was then prepped and draped in sterile  fashion. Local anesthesia was achieved with 2% Lidocaine solution. A  20 gauge spinal needle was then introduced at the L3-L4 level under  direct fluoroscopic guidance until return of  CSF was demonstrated. 10  ml of clear CSF was collected in 4 tubes. The stylet was then  replaced and the spinal needle was removed.  Hemostasis was achieved  with direct pressure and the area was dressed with a Band-Aid. The  patient tolerated procedure well without any immediate or clinically  apparent complications. Total fluoroscopy time was 0.5 MINUTES.      The collected CSF was then sent to the lab for appropriate lab tests  as ordered by the referring physician.      FINDINGS:  A single periprocedural spot fluoroscopic image was provided for  interpretation. Image quality is such that fine bony detail is  obscured. A needle is seen to overlie the posterior elements of L4.      Impression: Successful fluoroscopic guided lumbar puncture.      I personally reviewed the images/study and I agree with the findings  as stated by Endy Boss DO PGY-2. This study was interpreted at  [facility], [place], [place].      MACRO:  None          Dictation workstation:   [id number]         PROBLEM LISTS      Problem List Items Addressed This Visit          Neuro    * (Principal) Altered mental status, unspecified altered mental status type - Primary       This dictation was created with voice recognition software. Although every effort is made to review the dictation as it is transcribed, on occasion spoken words, phrases, names, numbers, punctuation etc can be misinterpreted by the the technology leading to omissions or inappropriate outcomes.     Geraldine Rojas MD

## 2024-11-12 LAB
ALBUMIN SERPL BCP-MCNC: 3.7 G/DL (ref 3.4–5)
ALP SERPL-CCNC: 96 U/L (ref 33–136)
ALT SERPL W P-5'-P-CCNC: 31 U/L (ref 10–52)
ANION GAP SERPL CALC-SCNC: 12 MMOL/L (ref 10–20)
AST SERPL W P-5'-P-CCNC: 33 U/L (ref 9–39)
BILIRUB SERPL-MCNC: 0.3 MG/DL (ref 0–1.2)
BUN SERPL-MCNC: 11 MG/DL (ref 6–23)
CALCIUM SERPL-MCNC: 8.4 MG/DL (ref 8.6–10.6)
CHLORIDE SERPL-SCNC: 104 MMOL/L (ref 98–107)
CO2 SERPL-SCNC: 29 MMOL/L (ref 21–32)
CREAT SERPL-MCNC: 0.77 MG/DL (ref 0.5–1.3)
EGFRCR SERPLBLD CKD-EPI 2021: >90 ML/MIN/1.73M*2
ERYTHROCYTE [DISTWIDTH] IN BLOOD BY AUTOMATED COUNT: 12.7 % (ref 11.5–14.5)
GLUCOSE BLD MANUAL STRIP-MCNC: 135 MG/DL (ref 74–99)
GLUCOSE BLD MANUAL STRIP-MCNC: 181 MG/DL (ref 74–99)
GLUCOSE BLD MANUAL STRIP-MCNC: 190 MG/DL (ref 74–99)
GLUCOSE BLD MANUAL STRIP-MCNC: 340 MG/DL (ref 74–99)
GLUCOSE SERPL-MCNC: 192 MG/DL (ref 74–99)
HCT VFR BLD AUTO: 35.3 % (ref 41–52)
HGB BLD-MCNC: 12 G/DL (ref 13.5–17.5)
HOLD SPECIMEN: NORMAL
MCH RBC QN AUTO: 31.3 PG (ref 26–34)
MCHC RBC AUTO-ENTMCNC: 34 G/DL (ref 32–36)
MCV RBC AUTO: 92 FL (ref 80–100)
NRBC BLD-RTO: 0 /100 WBCS (ref 0–0)
PLATELET # BLD AUTO: 169 X10*3/UL (ref 150–450)
POTASSIUM SERPL-SCNC: 3.6 MMOL/L (ref 3.5–5.3)
PROT SERPL-MCNC: 6.2 G/DL (ref 6.4–8.2)
RBC # BLD AUTO: 3.84 X10*6/UL (ref 4.5–5.9)
SODIUM SERPL-SCNC: 141 MMOL/L (ref 136–145)
WBC # BLD AUTO: 4.9 X10*3/UL (ref 4.4–11.3)

## 2024-11-12 PROCEDURE — 2500000002 HC RX 250 W HCPCS SELF ADMINISTERED DRUGS (ALT 637 FOR MEDICARE OP, ALT 636 FOR OP/ED): Performed by: STUDENT IN AN ORGANIZED HEALTH CARE EDUCATION/TRAINING PROGRAM

## 2024-11-12 PROCEDURE — 99232 SBSQ HOSP IP/OBS MODERATE 35: CPT | Performed by: STUDENT IN AN ORGANIZED HEALTH CARE EDUCATION/TRAINING PROGRAM

## 2024-11-12 PROCEDURE — 1100000001 HC PRIVATE ROOM DAILY

## 2024-11-12 PROCEDURE — 2500000004 HC RX 250 GENERAL PHARMACY W/ HCPCS (ALT 636 FOR OP/ED): Performed by: STUDENT IN AN ORGANIZED HEALTH CARE EDUCATION/TRAINING PROGRAM

## 2024-11-12 PROCEDURE — 97530 THERAPEUTIC ACTIVITIES: CPT | Mod: GP

## 2024-11-12 PROCEDURE — S0136 CLOZAPINE, 25 MG: HCPCS | Performed by: STUDENT IN AN ORGANIZED HEALTH CARE EDUCATION/TRAINING PROGRAM

## 2024-11-12 PROCEDURE — 99233 SBSQ HOSP IP/OBS HIGH 50: CPT | Performed by: PSYCHIATRY & NEUROLOGY

## 2024-11-12 PROCEDURE — 85027 COMPLETE CBC AUTOMATED: CPT | Performed by: STUDENT IN AN ORGANIZED HEALTH CARE EDUCATION/TRAINING PROGRAM

## 2024-11-12 PROCEDURE — 36415 COLL VENOUS BLD VENIPUNCTURE: CPT | Performed by: STUDENT IN AN ORGANIZED HEALTH CARE EDUCATION/TRAINING PROGRAM

## 2024-11-12 PROCEDURE — 82947 ASSAY GLUCOSE BLOOD QUANT: CPT

## 2024-11-12 PROCEDURE — 80053 COMPREHEN METABOLIC PANEL: CPT | Performed by: STUDENT IN AN ORGANIZED HEALTH CARE EDUCATION/TRAINING PROGRAM

## 2024-11-12 PROCEDURE — 2500000001 HC RX 250 WO HCPCS SELF ADMINISTERED DRUGS (ALT 637 FOR MEDICARE OP): Performed by: STUDENT IN AN ORGANIZED HEALTH CARE EDUCATION/TRAINING PROGRAM

## 2024-11-12 RX ORDER — LORAZEPAM 2 MG/ML
1 INJECTION INTRAMUSCULAR EVERY 6 HOURS SCHEDULED
Status: DISCONTINUED | OUTPATIENT
Start: 2024-11-12 | End: 2024-11-14

## 2024-11-12 RX ORDER — LORAZEPAM 2 MG/ML
1 INJECTION INTRAMUSCULAR EVERY 6 HOURS SCHEDULED
Status: DISCONTINUED | OUTPATIENT
Start: 2024-11-12 | End: 2024-11-12

## 2024-11-12 RX ORDER — LORAZEPAM 2 MG/ML
1 INJECTION INTRAMUSCULAR EVERY 6 HOURS PRN
Status: DISCONTINUED | OUTPATIENT
Start: 2024-11-12 | End: 2024-11-14

## 2024-11-12 ASSESSMENT — PAIN SCALES - GENERAL
PAINLEVEL_OUTOF10: 0 - NO PAIN

## 2024-11-12 ASSESSMENT — COGNITIVE AND FUNCTIONAL STATUS - GENERAL
WALKING IN HOSPITAL ROOM: A LITTLE
CLIMB 3 TO 5 STEPS WITH RAILING: A LITTLE
MOBILITY SCORE: 20
MOVING TO AND FROM BED TO CHAIR: A LITTLE
STANDING UP FROM CHAIR USING ARMS: A LITTLE

## 2024-11-12 ASSESSMENT — PAIN - FUNCTIONAL ASSESSMENT
PAIN_FUNCTIONAL_ASSESSMENT: 0-10

## 2024-11-12 ASSESSMENT — PAIN SCALES - WONG BAKER: WONGBAKER_NUMERICALRESPONSE: NO HURT

## 2024-11-12 NOTE — NURSING NOTE
"IV Team asked to place PIV for IV Dextrose and Ativan. Pt sitting in chair initially then stood up to briefly walk around in room. Introduced myself and stated that I needed to place PIV for IV medications. Pt stated \" I don't need that right now\" while standing up. Bedside RN present and aware. No PIV placed  "

## 2024-11-12 NOTE — PROGRESS NOTES
Clayton Parra is a 59 y.o. y.o. male on day 6 of admission presenting with Altered mental status, unspecified altered mental status type [R41.82].     Subjective   Patient remains medically ready for discharge and is currently pending inpatient psychiatry placement. EPAT involved and actively working on finding an accepting facility. Patients sister/legal guardian Mehnaz was updated by psychiatry yesterday regarding plan of care.    TCC/SW remain available as needed.    - Halina QUIJANO, MA, LSW  Care Transitions   [facility] Secure Chat or [phone]

## 2024-11-12 NOTE — PROGRESS NOTES
Clayton Parra is a 59 y.o. male on hospital day 6 of admission presenting with Altered mental status, unspecified altered mental status type.    Subjective   Patient was seen this morning bedside with medical student and resident.  When asked how he is doing he states he got enough sleep when asked what enough sleep is he states 6 to 5 hours.  When asked how he is feeling in terms of his mood he states just glad to be alive and breathing.  The writer asked him regarding depression and anxiety and the patient said no to either.  The writer asked him regarding SI HI AVH he said no as well.  The writer tried asking him about taking his medications.  The patient then went on to state I need to take so many units of insulin for my diabetes, I do not want to have hypoglycemia.  The writer tried to redirect him to discuss his clozapine and other psychiatric medications for his mental health and the patient kept looking at the writer and saying clozapine is for my diabetes.  After much redirection the patient would not significantly engage enough to discuss his medications.  At this time the interview was ended.    Additional Information:          Objective   Upon physical examination there was a mild tremor present bilaterally.  There was no significant rigidity in the upper limbs or lower limbs present.    Vital Signs:      11/10/2024     7:44 AM 11/10/2024     3:54 PM 11/10/2024    11:00 PM 11/11/2024     7:40 AM 11/11/2024     3:00 PM 11/12/2024    12:05 AM 11/12/2024     7:47 AM   Vitals   Systolic 149 112 128 149 146 134 146   Diastolic 86 80 68 82 89 87 87   Heart Rate 77 85 87 88 89 77 77   Temp 36.6 °C (97.9 °F) 36.5 °C (97.7 °F) 36 °C (96.8 °F) 36.4 °C (97.5 °F) 36.3 °C (97.3 °F) 36.6 °C (97.9 °F) 36.4 °C (97.5 °F)   Resp 18 18 18    17      Intake/Output last 3 Shifts:  I/O last 3 completed shifts:  In: - (0 mL/kg)   Out: 700 (7 mL/kg) [Urine:700 (0.2 mL/kg/hr)]  Weight: 99.8 kg     Mental Status  "Exam:  General/Appearance: Moderate Psychological Distress, appears younger than stated age, in hospital gown, sitting on bed , body habitus: overweight, grooming/hygiene is reasonable for setting, bald, IV in place  Attitude/Behavior: engages in interview, cooperative, slightly suspicious , appropriate eye contact, calm, mildly guarded  Motor Activity: no psychomotor agitation/retardation, tics/tremors - present in upper limbs , gait: not assessed  Mood: \"good\"  Affect: Quality-mood congruent, Intensity-energized, Range-expansive  Speech:  fast rate, normal rhythm, normal tone, slightly raised volume, fast prosody  Thought Process: linear, perseverative, derailment, concrete  Thought Content: denies SI/HI, Delusional thinking: none elicited  Thought Perception: denies AVH  Cognition: alert & oriented x 3  Insight: fair  Judgment: fair    Current Medications  Scheduled   aspirin, 81 mg, oral, Daily  atorvastatin, 40 mg, oral, Nightly  carBAMazepine XR, 200 mg, oral, Nightly  cloZAPine, 25 mg, oral, BID  docusate sodium, 100 mg, oral, BID  enoxaparin, 40 mg, subcutaneous, q24h  ferrous sulfate (325 mg ferrous sulfate), 65 mg of iron, oral, Daily with breakfast  insulin glargine, 15 Units, subcutaneous, Nightly  insulin lispro, 0-10 Units, subcutaneous, q6h  insulin lispro, 18 Units, subcutaneous, TID AC  lisinopril, 20 mg, oral, Daily  LORazepam, 1 mg, intravenous, q6h YUNG  [Held by provider] metFORMIN, 1,000 mg, oral, BID  polyethylene glycol, 17 g, oral, Daily  propranolol, 10 mg, oral, TID  thiamine, 100 mg, oral, Daily      Continuous      PRN   PRN medications: dextrose, dextrose, dextrose, dextrose, glucagon, glucagon, glucagon, glucagon, LORazepam, LORazepam, metoprolol     Labs  Results for orders placed or performed during the hospital encounter of 11/06/24 (from the past 24 hours)   POCT GLUCOSE   Result Value Ref Range    POCT Glucose 165 (H) 74 - 99 mg/dL   Urinalysis with Reflex Culture and Microscopic "   Result Value Ref Range    Color, Urine Yellow Light-Yellow, Yellow, Dark-Yellow    Appearance, Urine Clear Clear    Specific Gravity, Urine 1.039 (N) 1.005 - 1.035    pH, Urine 6.0 5.0, 5.5, 6.0, 6.5, 7.0, 7.5, 8.0    Protein, Urine 20 (TRACE) NEGATIVE, 10 (TRACE), 20 (TRACE) mg/dL    Glucose, Urine OVER (4+) (A) Normal mg/dL    Blood, Urine NEGATIVE NEGATIVE    Ketones, Urine TRACE (A) NEGATIVE mg/dL    Bilirubin, Urine NEGATIVE NEGATIVE    Urobilinogen, Urine Normal Normal mg/dL    Nitrite, Urine NEGATIVE NEGATIVE    Leukocyte Esterase, Urine NEGATIVE NEGATIVE   Extra Urine Gray Tube   Result Value Ref Range    Extra Tube Hold for add-ons.    Urinalysis Microscopic   Result Value Ref Range    WBC, Urine 1-5 1-5, NONE /HPF    RBC, Urine 1-2 NONE, 1-2, 3-5 /HPF    Mucus, Urine FEW Reference range not established. /LPF   POCT GLUCOSE   Result Value Ref Range    POCT Glucose 231 (H) 74 - 99 mg/dL   POCT GLUCOSE   Result Value Ref Range    POCT Glucose 215 (H) 74 - 99 mg/dL   POCT GLUCOSE   Result Value Ref Range    POCT Glucose 129 (H) 74 - 99 mg/dL   POCT GLUCOSE   Result Value Ref Range    POCT Glucose 181 (H) 74 - 99 mg/dL        Imaging  No results found.     Psychiatric Risk Assessment  Acute Risk of Harm to Others is Considered: Low  Acute Risk of Harm to Self is Considered: Moderate    Assessment:  59M history of schizophrenia (bipolar?) and diabetes, who returned to ED from inpatient psych for medical exam due to worsening alterations in mentation, refusal to take PO, and unstable vital signs. Psychiatry was consulted on 11/6 for psychiatric evaluation. His outpatient medication regimen appears to be benztropine 0.5 mg BID, carbamazepine 400 mg nightly, clozapine 500 mg nightly, propranolol 10 mg TID.     11/12: Patient seen this morning on rounds.  Overall seems to be doing well.  Notable improvement from previous days where he was noted to be quite rigid, on physical exam tody rigidty was mostly gone from  upper extremities. Did have BL upper fine tremor present.  Patient continued to have some very mildly pressured speech today.  Overall the writer tried to engage the patient regarding his medications and hopefully getting him to take some of the psychiatric ones.  The patient does not have a significant understanding of what the medications are for and continues to refuse to take them.  This is making things difficult in terms of his clozapine titration given we cannot advance the dosage to the patient takes it for significantly amount of time.  We will continue to work with the patient and try and hopefully find a way to get him to comfortably take the medications.  In terms of his current medications we will not change the dose of clozapine and leave it at 25 mg p.o. twice daily.  We will discontinue the carbamazepine as it has been some days since the patient has been on this medication and it likely isn't serving a purpose. Will not make changes to the lorazepam dosage given he is not taking clozapine which hopefully would supplant the need for this.    Continues to meet criteria for inpatient psychiatry given the help poorly he was doing previously and the fact none of his medications are at appropriate dosages.    Impression:  Catatonia   Schizophrenia    Recommendations:  Safety/Monitoring:  Patient meets criteria for inpatient psychiatric admission  Patient does not require 1:1 observation from a psychiatric perspective, defer to primary team  Daily interdisciplinary safety and planning huddle with Psychiatry  Video monitoring as with all patients on the MPU  Psychiatry will follow patient daily while on the MPU    Medications:  - Continue clozapine at 25 mg BID, can use ODT given issues with PO (must use clozapine order panel)    - Discontinue cogentin   - Discontinue carbamazepine  - continue scheduled ativan 1mg Q6 IV  - Continue Bowel Regimen    Considerations:  Weekly ANC given he is on  clozapine  Weekly Troponin's    Therapies recommended:  art therapy, music therapy, pet therapy, recreational therapy, and will continue to assess for benefit and meaningful participation    Delirium Guidelines  Provide glasses, hearing aids and/or communication boards as needed for impairments  Frequent reorientation, minimize room and staff changes  Open blinds during the day, dark/quiet room at night   Minimal interruptions and daytime naps  Early evaluation and intervention by PT, out of bed as tolerated  Minimize use of restraints   Minimize use of benzodiazepines, anticholinergic medications, and opiates (while ensuring adequate treatment of pain)  Keep Mg>2, K>4 (as able)  Ensure regular bowel and bladder function (as able)    Disposition/Discharge Planning:  SW following, appreciate assistance      Multidisciplinary Rounding  Consulting Physician, Fellow, Medical Student, Charge Nurse, Nurse, Pharmacist, Social Work, Care Coordinator, Dietician, Music Therapy, Art Therapy, Recreational Therapy, and     Medication Consent  N/A - Consult Service    Michael Israel MD   PGY-5 consult liaison psychiatry fellow

## 2024-11-12 NOTE — PROGRESS NOTES
Physical Therapy    Physical Therapy Treatment    Patient Name: Clayton Parra  MRN: [id number]  Department: [place] [id number]  Room: [address] [place] [address]  Today's Date: 11/12/2024  Time Calculation  Start Time: 1247  Stop Time: 1258  Time Calculation (min): 11 min         Assessment/Plan   PT Assessment  PT Assessment Results: Decreased mobility, Decreased safety awareness  End of Session Communication: Bedside nurse  Assessment Comment: Increasing mobility, will benefit from skilled PT followup to progress with overall mobility safety.  End of Session Patient Position: Up in chair, Alarm off, not on at start of session     PT Plan  Treatment/Interventions: Gait training, Transfer training, Balance training, Therapeutic exercise, Therapeutic activity  PT Plan: Ongoing PT  PT Frequency: 2 times per week  PT Discharge Recommendations:  (Pt may benefit from PT involvement at low intensity at LT facility (24/7 (S) ) to maximize safe mobility due to recent immobility/catatonic state, currently needing (S) for safety/to direct mobility tasks safely.)  PT Recommended Transfer Status: Stand by assist  PT - OK to Discharge: Yes (POC/goals/discharge rec intensity created)      General Visit Information:   PT  Visit  PT Received On: 11/12/24  General  Prior to Session Communication: Bedside nurse  General Comment: Upon initial PT encounter, pt on his way to the restroom.  Pt up in room without assist, demos slow cautious gait.  Completes brief further amb prior to return to bedside chair to finish eating lunch.  Will continue to infrequently follow while inpatient.    Subjective   Precautions:  Precautions  Medical Precautions: Fall precautions    Objective   Pain:  Pain Assessment  Pain Assessment: 0-10  0-10 (Numeric) Pain Score: 0 - No pain  Cognition:  Cognition  Arousal/Alertness: Appropriate responses to stimuli  Following Commands: Follows one step commands with repetition  Cognition Comments: quick responses to  "questions  Coordination:  Coordination Comment: mildly struggles with lids on containers, noted some pears on the floor-> pt stated \"they slide off\" with soon use  Postural Control:  Postural Control  Postural Control: Within Functional Limits  Static Standing Balance  Static Standing-Balance Support: No upper extremity supported  Static Standing-Level of Assistance: Distant supervision, Independent  Dynamic Standing Balance  Dynamic Standing-Balance Support:  (variable UE support)  Dynamic Standing-Level of Assistance: Distant supervision        Bed Mobility  Bed Mobility: No (up in room upon PT entry, in bedside chair upon PT exit)    Ambulation/Gait Training  Ambulation/Gait Training Performed: Yes  Ambulation/Gait Training 1  Surface 1: Level tile  Device 1:  (furniture walks on way to bathroom, not on why back)  Assistance 1: Distant supervision  Quality of Gait 1: Decreased step length, Narrow base of support  Comments/Distance (ft) 1: 15 ft x2  Transfers  Transfer: Yes  Transfer 1  Transfer From 1: Chair with arms to  Transfer to 1: Stand  Transfer Level of Assistance 1: Modified independent  Transfers 2  Transfer From 2: Stand to  Transfer to 2: Chair with arms  Transfer Level of Assistance 2: Distant supervision    Outcome Measures:  [facility] Basic Mobility  Turning from your back to your side while in a flat bed without using bedrails: None  Moving from lying on your back to sitting on the side of a flat bed without using bedrails: None  Moving to and from bed to chair (including a wheelchair): A little  Standing up from a chair using your arms (e.g. wheelchair or bedside chair): A little  To walk in hospital room: A little  Climbing 3-5 steps with railing: A little  Basic Mobility - Total Score: 20    Education Documentation  Mobility Training, taught by Janki Wick, PT at 11/12/2024  1:37 PM.  Learner: Patient  Readiness: Acceptance  Method: Explanation  Response: Verbalizes Understanding, Needs " Reinforcement  Comment: PT POC, safe mobility         Encounter Problems       Encounter Problems (Active)       Balance       Patient will maintain balance to allow for safe mobility with (S)-> (I). (Progressing)       Start:  11/08/24    Expected End:  11/22/24               Mobility       STG - Patient will ambulate with (S)-> (I), 100 ft x2. (Progressing)       Start:  11/08/24    Expected End:  11/22/24               PT Transfers       STG - Patient will perform bed mobility (S)-> (I).  (Progressing)       Start:  11/08/24    Expected End:  11/22/24            STG - Patient will transfer sit to and from stand (S)-> (I).  (Met)       Start:  11/08/24    Expected End:  11/22/24    Resolved:  11/12/24 11/12/24 at 1:38 PM - Janki Wick, PT

## 2024-11-12 NOTE — PROGRESS NOTES
.                                                                                                                                                                                                                                                                                             INTERNAL MEDICINE PROGRESS NOTE     BRIEF NARRATIVE      Clayton Parra is a 59 y.o. male on day 6 of admission presenting with Altered mental status, unspecified altered mental status type.    Pt with significant psychiatric history including schizophrenia/schizoaffective/bipolar with apparent home medications ( haldol IV 100mg monthly, carbamazepine 400 mg qhs, clozapine 500 mg qhs,  benztropine 0.5 mg BID, propanolol 10mg TID), T2DM  HLD, anemia, HTN, who originally presented to the ED a couple days ago from [facility] for refusing his psych medications.  Was deemed to not have capacity and sent to [facility] for psych admission.  Upon arrival at [facility] patient was found to be alert but nonverbal and not oriented to self place or time.  On arrival to the ED he was found to be diaphoretic rigid.  At the time of this exam he was again diaphoretic and rigid nonverbal his eyes were open but he did not respond to questions and he responded very lightly to noxious stimuli.     While in the ED his vitals were stable.  They attempted an LP unsuccessfully.  His labs were WNL.  CT C/A/P and CT of the head were negative for any acute issues CXR was also negative.  Patient was admitted to MPU for  encephalopathy       ASSESSMENT / PLANS      > Catatonia   > Possible NMS. Less likely   > Acute encephalopathy, multifactorial, psychosis v toxic v infectious   > H/o Schizophrenia   > Xanthochromic CSF without evidence of SAH   Etiology of a catatonia is most likely secondary to antipsychotic. However differential includes possible NMS v serotonin syndrome (tachycardia, hypertension, rigidity, mixed clonus and hyperreflexia),  meningitis/ encephalitis less likely (negative white count, bcx neg, nuchal rigidity noted but  Kernig and Brudzinski signs negative).   CT head on 11/6 negative for intracranial hemorrhage   - S/p Acyclovir, ampicillin, ceftriaxone and vancomycin in the ED for empiric meningitis coverage. Bcx, WBC negative, afebrile, procal neg. No infectious source identified so far, Will not initiate abx at the moment.    - LP 11/8 : Neutrophils 42, protein 83, glucose 151, RBC 3000.   - Psych following   - Pt currently meet inpatient psych admission   - Patient is medically clear for impatient psych admission   - Per psych recs, will cont  IV lorazepam 1mg QID (Transitioned to po 11/9)   - Stopped Depacon 250mg and started IV Ativan 1mg TID prn for agitation. Will transition to po when take po meds uninterrupted for 24h   - Restarted carbamazepine 11/7, then discontinued 11/12 per psych rec   - Restated propanolol 10mg TID 11/8   - Restated clozapine 11/8 per reinitiation protocol   - Cont to hold benztropine ? Psych to reevaluate   - Pt is on IV Haldol Dec 200mg IM q28 days last given 10/26/24. Cont   - Routine EEG results pending   - TSH, b12, GGT and folate wnl,  thiamine level pending   - Patient is on thiamine 100mg daily at home for vit deficiency and Wernicke ppx, will load with IV thiamine for 3 days. Will resume home dose afterward    - EKG on 11/8 noted normal Qtc, obtain EKG q72 when on antipsychotics  - Cont fall precaution   - 1:1 sitter  as needed   - Discussed with patient sister (the guardian) on 11/8 and updated. At baseline patient does ambulate. Sister requested placement to a different facility   - PT/OT      > T2DM  - Hold home metformin  - Home insulin regimen: Lantus 70 units nightly, Humalog 18 units 3 times daily with meals  - BG fluctuating between 127- 262. For now will continue Lantus 10u nightly , Lispro 18u TID with meals and #1 SSI. Goal -180   - Cont accucheck      > HTN  > HLD  -Will hold  home aspirin and atorvastatin and lisinopril for now until patient is able to take p.o.  - Cont IV lopressor 5mg for SBP > 160  DBP > 110      > Anemia  -Hgb stable  -Will hold home iron     Fluids: monitor and replete as needed  Electrolytes: monitor and replete as needed  Nutrition: Regular diet   GI prophylaxis: as needed  DVT prophylaxis: SCD  Code Status: full code    SUBJECTIVE       Patient seen and examined today, looks much much better compared to the day before.  Patient appeared to be close to his baseline now.    OBJECTIVE      Visit Vitals  /89   Pulse 91   Temp 36.6 °C (97.9 °F)   Resp 17        Intake/Output Summary (Last 24 hours) at 11/12/2024 1532  Last data filed at 11/12/2024 1400  Gross per 24 hour   Intake 720 ml   Output --   Net 720 ml         Physical Exam  Constitutional:       Appearance: He is ill-appearing and toxic-appearing.   HENT:      Head: Normocephalic and atraumatic.      Right Ear: Tympanic membrane normal.      Left Ear: Tympanic membrane normal.   Eyes:      Pupils: Pupils are equal, round, and reactive to light.   Cardiovascular:      Rate and Rhythm: Tachycardia present.   Abdominal:      Palpations: Abdomen is soft.   Musculoskeletal:         General: Normal range of motion.      Cervical back: Rigidity present.   Skin:     Capillary Refill: Capillary refill takes less than 2 seconds.   Neurological:      Mental Status: He is disoriented.        Current Meds   aspirin, 81 mg, oral, Daily  atorvastatin, 40 mg, oral, Nightly  cloZAPine, 25 mg, oral, BID  docusate sodium, 100 mg, oral, BID  enoxaparin, 40 mg, subcutaneous, q24h  ferrous sulfate (325 mg ferrous sulfate), 65 mg of iron, oral, Daily with breakfast  insulin glargine, 15 Units, subcutaneous, Nightly  insulin lispro, 0-10 Units, subcutaneous, q6h  insulin lispro, 18 Units, subcutaneous, TID AC  lisinopril, 20 mg, oral, Daily  LORazepam, 1 mg, intramuscular, q6h YUNG  [Held by provider] metFORMIN, 1,000 mg,  oral, BID  polyethylene glycol, 17 g, oral, Daily  propranolol, 10 mg, oral, TID  thiamine, 100 mg, oral, Daily       PRN medications: dextrose, dextrose, dextrose, dextrose, glucagon, glucagon, glucagon, glucagon, LORazepam, LORazepam, metoprolol     LABS and IMAGING     WBC   Date Value Ref Range Status   11/12/2024 4.9 4.4 - 11.3 x10*3/uL Final   11/11/2024 4.5 4.4 - 11.3 x10*3/uL Final   11/10/2024 5.5 4.4 - 11.3 x10*3/uL Final     Hemoglobin   Date Value Ref Range Status   11/12/2024 12.0 (L) 13.5 - 17.5 g/dL Final   11/11/2024 12.1 (L) 13.5 - 17.5 g/dL Final   11/10/2024 12.7 (L) 13.5 - 17.5 g/dL Final     Hematocrit   Date Value Ref Range Status   11/12/2024 35.3 (L) 41.0 - 52.0 % Final   11/11/2024 35.6 (L) 41.0 - 52.0 % Final   11/10/2024 38.7 (L) 41.0 - 52.0 % Final     Bicarbonate   Date Value Ref Range Status   11/12/2024 29 21 - 32 mmol/L Final   11/11/2024 28 21 - 32 mmol/L Final   11/10/2024 26 21 - 32 mmol/L Final     Creatinine   Date Value Ref Range Status   11/12/2024 0.77 0.50 - 1.30 mg/dL Final   11/11/2024 0.84 0.50 - 1.30 mg/dL Final   11/10/2024 0.93 0.50 - 1.30 mg/dL Final     Calcium   Date Value Ref Range Status   11/12/2024 8.4 (L) 8.6 - 10.6 mg/dL Final   11/11/2024 8.6 8.6 - 10.6 mg/dL Final   11/10/2024 8.8 8.6 - 10.6 mg/dL Final       FL guided lumbar puncture  Narrative: Interpreted By:  Maurisio Rodriguez and Stevens Alex   STUDY:  FL GUIDED LUMBAR PUNCTURE;  11/8/2024 2:47 pm      INDICATION:  Signs/Symptoms:lumbar puncture-- several attempts in the ED.          COMPARISON:  None.      ACCESSION NUMBER(S):  [id number]      ORDERING CLINICIAN:  LUIS CORTÉS      TECHNIQUE:  After discussion of risks, benefits, and alternatives, oral and  written informed consent was obtained. The patient was placed in  prone position on exam table. The L3-4 interspace was then marked  under fluoroscopy. The patient was then prepped and draped in sterile  fashion. Local anesthesia was achieved  with 2% Lidocaine solution. A  20 gauge spinal needle was then introduced at the L3-L4 level under  direct fluoroscopic guidance until return of CSF was demonstrated. 10  ml of clear CSF was collected in 4 tubes. The stylet was then  replaced and the spinal needle was removed.  Hemostasis was achieved  with direct pressure and the area was dressed with a Band-Aid. The  patient tolerated procedure well without any immediate or clinically  apparent complications. Total fluoroscopy time was 0.5 MINUTES.      The collected CSF was then sent to the lab for appropriate lab tests  as ordered by the referring physician.      FINDINGS:  A single periprocedural spot fluoroscopic image was provided for  interpretation. Image quality is such that fine bony detail is  obscured. A needle is seen to overlie the posterior elements of L4.      Impression: Successful fluoroscopic guided lumbar puncture.      I personally reviewed the images/study and I agree with the findings  as stated by Endy Boss DO PGY-2. This study was interpreted at  [place], [place].      MACRO:  None      Signed by: Maurisio Rodriguez 11/11/2024 2:34 PM  Dictation workstation:   [id number]         PROBLEM LISTS      Problem List Items Addressed This Visit          Neuro    * (Principal) Altered mental status, unspecified altered mental status type - Primary       This dictation was created with voice recognition software. Although every effort is made to review the dictation as it is transcribed, on occasion spoken words, phrases, names, numbers, punctuation etc can be misinterpreted by the the technology leading to omissions or inappropriate outcomes.     Geraldine Rojas MD

## 2024-11-12 NOTE — NURSING NOTE
Patient anticipated to discharge to [facility] around 1400, report called to Marialuisa - nurse at facility no questions at this time, Carlotta/EDELMIRA in discharge paperwork with oxycodone script, no IV access, ortho to assess right hip incision per trauma team before discharge, patient belongings returned, otherwise anticipated to discharge via [facility].

## 2024-11-12 NOTE — CARE PLAN
Application for Emergency Admission      Ready for Transfer?  Is the patient medically cleared for transfer to inpatient psychiatry: Yes  Has the patient been accepted to an inpatient psychiatric hospital: Yes    Application for Emergency Admission  IN ACCORDANCE WITH [place] 5[place]  The Chief Clinical Officer of: [facility] [place] 11/12/2024 .4:00 PM    Reason for Hospitalization  The undersigned has reason to believe that: Clayton Parra Is a mentally ill person subject to hospitalization by court order under division B Section 5122.01 of the Revised Code, i.e., this person:    1.Yes  Represents a substantial risk of physical harm to self as manifested by evidence of threats of, or attempts at, suicide or serious self-inflicted bodily harm    2.Yes Represents a substantial risk of physical harm to others as manifested by evidence of recent homicidal or other violent behavior, evidence of recent threats that place another in reasonable fear of violent behavior and serious physical harm, or other evidence of present dangerousness    3.Yes Represents a substantial and immediate risk of serious physical impairment or injury to self as manifested by  evidence that the person is unable to provide for and is not providing for the person's basic physical needs because of the person's mental illness and that appropriate provision for those needs cannot be made  immediately available in the community    4.Yes Would benefit from treatment in a hospital for his mental illness and is in need of such treatment as manifested by evidence of behavior that creates a grave and imminent risk to substantial rights of others or  himself.    5.Yes Would benefit from treatment as manifested by evidence of behavior that indicates all of the following:       (a) The person is unlikely to survive safely in the community without supervision, based on a clinical determination.       (b) The person has a history of lack of compliance  with treatment for mental illness and one of the following applies:      (i) At least twice within the thirty-six months prior to the filing of an affidavit seeking court-ordered treatment of the person under section 5122.111 of the Revised Code, the lack of compliance has been a significant factor in necessitating hospitalization in a hospital or receipt of services in a forensic or other mental health unit of a correctional facility, provided that the thirty-six-month period shall be extended by the length of any hospitalization or incarceration of the person that occurred within the thirty-six-month period.      (ii) Within the forty-eight months prior to the filing of an affidavit seeking court-ordered treatment of the person under section 5122.111 of the Revised Code, the lack of compliance resulted in one or more acts of serious violent behavior toward self or others or threats of, or attempts at, serious physical harm to self or others, provided that the forty-eight-month period shall be extended by the length of any hospitalization or incarceration of the person that occurred within the forty-eight-month period.      (c) The person, as a result of mental illness, is unlikely to voluntarily participate in necessary treatment.       (d) In view of the person's treatment history and current behavior, the person is in need of treatment in order to prevent a relapse or deterioration that would be likely to result in substantial risk of serious harm to the person or others.    (e) Represents a substantial risk of physical harm to self or others if allowed to remain at liberty pending examination.    Therefore, it is requested that said person be admitted to the above named facility.    STATEMENT OF BELIEF    Must be filled out by one of the following: a psychiatrist, licensed physician, licensed clinical psychologist, health or ,  or .  (Statement shall include the circumstances  under which the individual was taken into custody and the reason for the person's belief that hospitalization is necessary. The statement shall also include a reference to efforts made to secure the individual's property at his residence if he was taken into custody there. Every reasonable and appropriate effort should be made to take this person into custody in the least conspicuous manner possible.)    Schizophrenia, patient does meet the criteria for inpatient psychiatry admission due to acute psychosis from uncontrolled schizophrenia      Geraldine Rojas MD 11/12/2024     _____________________________________________________________   Place of Employment: n/a     STATEMENT OF OBSERVATION BY PSYCHIATRIST, LICENSED PHYSICIAN, OR LICENSED CLINICAL PSYCHOLOGIST, IF APPLICABLE    Place of Observation (e.g., [facility] center, general hospital, office, emergency facility)  (If applicable, please complete)    Geraldine Rojas MD 11/12/2024    _____________________________________________________________

## 2024-11-13 ENCOUNTER — APPOINTMENT (OUTPATIENT)
Dept: CARDIOLOGY | Facility: HOSPITAL | Age: 59
DRG: 885 | End: 2024-11-13
Payer: COMMERCIAL

## 2024-11-13 LAB
GLUCOSE BLD MANUAL STRIP-MCNC: 115 MG/DL (ref 74–99)
GLUCOSE BLD MANUAL STRIP-MCNC: 200 MG/DL (ref 74–99)
GLUCOSE BLD MANUAL STRIP-MCNC: 216 MG/DL (ref 74–99)
GLUCOSE BLD MANUAL STRIP-MCNC: 228 MG/DL (ref 74–99)
PATH REVIEW-CELL CT,CSF: NORMAL
VIT B1 PYROPHOSHATE BLD-SCNC: 258 NMOL/L (ref 70–180)

## 2024-11-13 PROCEDURE — 2500000004 HC RX 250 GENERAL PHARMACY W/ HCPCS (ALT 636 FOR OP/ED): Performed by: NURSE PRACTITIONER

## 2024-11-13 PROCEDURE — 82947 ASSAY GLUCOSE BLOOD QUANT: CPT

## 2024-11-13 PROCEDURE — 1100000001 HC PRIVATE ROOM DAILY

## 2024-11-13 PROCEDURE — 99232 SBSQ HOSP IP/OBS MODERATE 35: CPT | Performed by: STUDENT IN AN ORGANIZED HEALTH CARE EDUCATION/TRAINING PROGRAM

## 2024-11-13 PROCEDURE — 2500000004 HC RX 250 GENERAL PHARMACY W/ HCPCS (ALT 636 FOR OP/ED): Performed by: STUDENT IN AN ORGANIZED HEALTH CARE EDUCATION/TRAINING PROGRAM

## 2024-11-13 PROCEDURE — 2500000002 HC RX 250 W HCPCS SELF ADMINISTERED DRUGS (ALT 637 FOR MEDICARE OP, ALT 636 FOR OP/ED): Performed by: STUDENT IN AN ORGANIZED HEALTH CARE EDUCATION/TRAINING PROGRAM

## 2024-11-13 PROCEDURE — 99233 SBSQ HOSP IP/OBS HIGH 50: CPT | Performed by: PSYCHIATRY & NEUROLOGY

## 2024-11-13 PROCEDURE — 93005 ELECTROCARDIOGRAM TRACING: CPT

## 2024-11-13 ASSESSMENT — PAIN SCALES - GENERAL
PAINLEVEL_OUTOF10: 0 - NO PAIN
PAINLEVEL_OUTOF10: 0 - NO PAIN

## 2024-11-13 NOTE — PROGRESS NOTES
.                                                                                                                                                                                                                                                                                             INTERNAL MEDICINE PROGRESS NOTE     BRIEF NARRATIVE      Clayton Parra is a 59 y.o. male on day 7 of admission presenting with Altered mental status, unspecified altered mental status type.    Pt with significant psychiatric history including schizophrenia/schizoaffective/bipolar with apparent home medications ( haldol IV 100mg monthly, carbamazepine 400 mg qhs, clozapine 500 mg qhs,  benztropine 0.5 mg BID, propanolol 10mg TID), T2DM  HLD, anemia, HTN, who originally presented to the ED a couple days ago from [facility] for refusing his psych medications.  Was deemed to not have capacity and sent to [facility] for psych admission.  Upon arrival at [facility] patient was found to be alert but nonverbal and not oriented to self place or time.  On arrival to the ED he was found to be diaphoretic rigid.  At the time of this exam he was again diaphoretic and rigid nonverbal his eyes were open but he did not respond to questions and he responded very lightly to noxious stimuli.     While in the ED his vitals were stable.  They attempted an LP unsuccessfully.  His labs were WNL.  CT C/A/P and CT of the head were negative for any acute issues CXR was also negative.  Patient was admitted to [facility] for  encephalopathy       ASSESSMENT / PLANS      > Catatonia - significantly improved  > Possible NMS. Less likely   > Acute encephalopathy, multifactorial, psychosis v toxic v infectious   > H/o Schizophrenia   > Xanthochromic CSF without evidence of SAH   Etiology of a catatonia is most likely secondary to antipsychotic. However differential includes possible NMS v serotonin syndrome (tachycardia, hypertension, rigidity, mixed  clonus and hyperreflexia), meningitis/ encephalitis less likely (negative white count, bcx neg, nuchal rigidity noted but  Kernig and Brudzinski signs negative).   CT head on 11/6 negative for intracranial hemorrhage   - S/p Acyclovir, ampicillin, ceftriaxone and vancomycin in the ED for empiric meningitis coverage. Bcx, WBC negative, afebrile, procal neg. No infectious source identified so far, Will not initiate abx at the moment.    - LP 11/8 : Neutrophils 42, protein 83, glucose 151, RBC 3000.   - Psych following   - Pt currently meet inpatient psych admission   - Patient is medically clear for impatient psych admission   - Per psych recs, will cont  IV lorazepam 1mg QID (Transitioned to po 11/9)   - Stopped Depacon 250mg and started IV Ativan 1mg TID prn for agitation. Will transition to po when take po meds uninterrupted for 24h   - Restarted carbamazepine 11/7, then discontinued 11/12 per psych rec   - Restated propanolol 10mg TID 11/8   - Restated clozapine 11/8 per reinitiation protocol. Cont daily CBC while inpatient, will recommend at minimum biweekly ANC check outpatient   - Stopped benztropine  - Pt is on IV Haldol Dec 200mg IM q28 days last given 10/26/24. Cont   - TSH, b12, GGT and folate wnl,  thiamine level pending   - Patient is on thiamine 100mg daily at home for vit deficiency and Wernicke ppx, will load with IV thiamine for 3 days. Will resume home dose afterward    - EKG on 11/8 noted normal Qtc, obtain EKG q72 when on antipsychotics  - Cont fall precaution   - 1:1 sitter  as needed   - Discussed with patient sister (the guardian) on 11/8 and updated. At baseline patient does ambulate. Sister requested placement to a different facility   - PT/OT      > T2DM  - Hold home metformin  - Home insulin regimen: Lantus 70 units nightly, Humalog 18 units 3 times daily with meals  - BG fluctuating between 127- 262. For now will continue Lantus 10u nightly , Lispro 18u TID with meals and #1 SSI. Goal BG  104-180   - Cont accucheck      > HTN  > HLD  -Will hold home aspirin and atorvastatin and lisinopril for now until patient is able to take p.o.  - Cont IV lopressor 5mg for SBP > 160  DBP > 110      > Anemia  -Hgb stable  -Will hold home iron     Fluids: monitor and replete as needed  Electrolytes: monitor and replete as needed  Nutrition: Regular diet   GI prophylaxis: as needed  DVT prophylaxis: SCD  Code Status: full code    SUBJECTIVE       Patient seen and examined today, looks much much better compared to the day before.  Patient appeared to be close to his baseline now.    OBJECTIVE      Visit Vitals  /79   Pulse 69   Temp 36.6 °C (97.9 °F)   Resp 17        Intake/Output Summary (Last 24 hours) at 11/13/2024 1234  Last data filed at 11/12/2024 1400  Gross per 24 hour   Intake 480 ml   Output --   Net 480 ml     Physical Exam  Constitutional:       Appearance: He is ill-appearing and toxic-appearing.   HENT:      Head: Normocephalic and atraumatic.      Right Ear: Tympanic membrane normal.      Left Ear: Tympanic membrane normal.   Eyes:      Pupils: Pupils are equal, round, and reactive to light.   Cardiovascular:      Rate and Rhythm: Tachycardia present.   Abdominal:      Palpations: Abdomen is soft.   Musculoskeletal:         General: Normal range of motion.      Cervical back: Rigidity present.   Skin:     Capillary Refill: Capillary refill takes less than 2 seconds.   Neurological:      Mental Status: He is disoriented.        Current Meds   aspirin, 81 mg, oral, Daily  atorvastatin, 40 mg, oral, Nightly  cloZAPine, 25 mg, oral, BID  docusate sodium, 100 mg, oral, BID  enoxaparin, 40 mg, subcutaneous, q24h  ferrous sulfate (325 mg ferrous sulfate), 65 mg of iron, oral, Daily with breakfast  insulin glargine, 15 Units, subcutaneous, Nightly  insulin lispro, 0-10 Units, subcutaneous, q6h  insulin lispro, 18 Units, subcutaneous, TID AC  lisinopril, 20 mg, oral, Daily  LORazepam, 1 mg, intramuscular,  q6h YUNG  [Held by provider] metFORMIN, 1,000 mg, oral, BID  polyethylene glycol, 17 g, oral, Daily  propranolol, 10 mg, oral, TID  thiamine, 100 mg, oral, Daily       PRN medications: dextrose, dextrose, dextrose, dextrose, glucagon, glucagon, glucagon, glucagon, LORazepam, LORazepam, metoprolol     LABS and IMAGING     WBC   Date Value Ref Range Status   11/12/2024 4.9 4.4 - 11.3 x10*3/uL Final   11/11/2024 4.5 4.4 - 11.3 x10*3/uL Final     Hemoglobin   Date Value Ref Range Status   11/12/2024 12.0 (L) 13.5 - 17.5 g/dL Final   11/11/2024 12.1 (L) 13.5 - 17.5 g/dL Final     Hematocrit   Date Value Ref Range Status   11/12/2024 35.3 (L) 41.0 - 52.0 % Final   11/11/2024 35.6 (L) 41.0 - 52.0 % Final     Bicarbonate   Date Value Ref Range Status   11/12/2024 29 21 - 32 mmol/L Final   11/11/2024 28 21 - 32 mmol/L Final     Creatinine   Date Value Ref Range Status   11/12/2024 0.77 0.50 - 1.30 mg/dL Final   11/11/2024 0.84 0.50 - 1.30 mg/dL Final     Calcium   Date Value Ref Range Status   11/12/2024 8.4 (L) 8.6 - 10.6 mg/dL Final   11/11/2024 8.6 8.6 - 10.6 mg/dL Final       FL guided lumbar puncture  Narrative: Interpreted By:  Maurisio Rodriguez and Stevens Alex   STUDY:  FL GUIDED LUMBAR PUNCTURE;  11/8/2024 2:47 pm      INDICATION:  Signs/Symptoms:lumbar puncture-- several attempts in the ED.          COMPARISON:  None.      ACCESSION NUMBER(S):  [id number]      ORDERING CLINICIAN:  LUIS CORTÉS      TECHNIQUE:  After discussion of risks, benefits, and alternatives, oral and  written informed consent was obtained. The patient was placed in  prone position on exam table. The L3-4 interspace was then marked  under fluoroscopy. The patient was then prepped and draped in sterile  fashion. Local anesthesia was achieved with 2% Lidocaine solution. A  20 gauge spinal needle was then introduced at the L3-L4 level under  direct fluoroscopic guidance until return of CSF was demonstrated. 10  ml of clear CSF was collected  in 4 tubes. The stylet was then  replaced and the spinal needle was removed.  Hemostasis was achieved  with direct pressure and the area was dressed with a Band-Aid. The  patient tolerated procedure well without any immediate or clinically  apparent complications. Total fluoroscopy time was 0.5 MINUTES.      The collected CSF was then sent to the lab for appropriate lab tests  as ordered by the referring physician.      FINDINGS:  A single periprocedural spot fluoroscopic image was provided for  interpretation. Image quality is such that fine bony detail is  obscured. A needle is seen to overlie the posterior elements of L4.      Impression: Successful fluoroscopic guided lumbar puncture.      I personally reviewed the images/study and I agree with the findings  as stated by Endy Boss DO PGY-2. This study was interpreted at  [facility], [place], [place].      MACRO:  None      Signed by: Maurisio Rodriguez 11/11/2024 2:34 PM  Dictation workstation:   [id number]         PROBLEM LISTS      Problem List Items Addressed This Visit          Neuro    * (Principal) Altered mental status, unspecified altered mental status type - Primary       This dictation was created with voice recognition software. Although every effort is made to review the dictation as it is transcribed, on occasion spoken words, phrases, names, numbers, punctuation etc can be misinterpreted by the the technology leading to omissions or inappropriate outcomes.     Geraldine Rojas MD

## 2024-11-13 NOTE — PROGRESS NOTES
Clayton Parra is a 59 y.o. male on hospital day 7 of admission presenting with Altered mental status, unspecified altered mental status type.    Subjective   Patient was seen this morning bedside with fellow and medical student. States he is frustrated because he did not sleep well. Asks me to check his pulse because it's over 200. Reports he took his medication last night as he's trying to get into a normal behavior pattern. When asked what that means to him he reports what he ate for breakfast. When asked about seeing or hearing anything else in the room he mumbled a response but was unwilling to repeat it. He said okay when asked how many people are in the room with him, he then said everything is good and his mood is positive. When asked if he slept poorly, he said no, and said he did not need anything else from the team.     Additional information: Patient took PO clozapine ovn, lost IV access and declined IM ativan doses ovn. Patient was accepted to inpatient facility, pending transport/auth.   Objective   Upon physical examination there was a mild tremor present bilaterally.  There was no significant rigidity in the upper limbs or lower limbs present.    Vital Signs:      11/12/2024    12:05 AM 11/12/2024     7:47 AM 11/12/2024    11:50 AM 11/12/2024     5:23 PM 11/12/2024     9:13 PM 11/13/2024    12:05 AM 11/13/2024     7:29 AM   Vitals   Systolic 134 146 140 134 159 127 140   Diastolic 87 87 89 75 91 73 79   Heart Rate 77 77 91 80 76 65 69   Temp 36.6 °C (97.9 °F) 36.4 °C (97.5 °F) 36.6 °C (97.9 °F) 36 °C (96.8 °F) 36.4 °C (97.5 °F) 36.7 °C (98.1 °F) 36.6 °C (97.9 °F)   Resp  17 17 17 18  17      Intake/Output last 3 Shifts:  I/O last 3 completed shifts:  In: 720 (7.2 mL/kg) [P.O.:720]  Out: - (0 mL/kg)   Weight: 99.8 kg     Mental Status Exam:  General/Appearance: Moderate Psychological Distress, appears younger than stated age, in hospital gown, sitting on bedside chair , body habitus: overweight,  "grooming/hygiene is reasonable for setting, bald  Attitude/Behavior: engages in interview, cooperative, slightly suspicious , appropriate eye contact, calm, mildly guarded  Motor Activity: no psychomotor agitation/retardation, tics/tremors - present in upper limbs , gait: not assessed  Mood: \"frustrated\" \"okay\"  Affect: Quality-mood congruent, Intensity-energized, Range-expansive  Speech:  fast rate, normal rhythm, normal tone, lowered volume, fast prosody, pressured  Thought Process: disorganized, derailment, vague  Thought Content: unable to assess, Delusional thinking: none elicited  Thought Perception: denies AVH, unable to assess  Cognition: no overt deficits noted, not formally assessed  Insight: fair  Judgment: fair    Current Medications  Scheduled   aspirin, 81 mg, oral, Daily  atorvastatin, 40 mg, oral, Nightly  cloZAPine, 25 mg, oral, BID  docusate sodium, 100 mg, oral, BID  enoxaparin, 40 mg, subcutaneous, q24h  ferrous sulfate (325 mg ferrous sulfate), 65 mg of iron, oral, Daily with breakfast  insulin glargine, 15 Units, subcutaneous, Nightly  insulin lispro, 0-10 Units, subcutaneous, q6h  insulin lispro, 18 Units, subcutaneous, TID AC  lisinopril, 20 mg, oral, Daily  LORazepam, 1 mg, intramuscular, q6h YUNG  [Held by provider] metFORMIN, 1,000 mg, oral, BID  polyethylene glycol, 17 g, oral, Daily  propranolol, 10 mg, oral, TID  thiamine, 100 mg, oral, Daily      Continuous      PRN   PRN medications: dextrose, dextrose, dextrose, dextrose, glucagon, glucagon, glucagon, glucagon, LORazepam, LORazepam, metoprolol     Labs  Results for orders placed or performed during the hospital encounter of 11/06/24 (from the past 24 hours)   POCT GLUCOSE   Result Value Ref Range    POCT Glucose 340 (H) 74 - 99 mg/dL   POCT GLUCOSE   Result Value Ref Range    POCT Glucose 135 (H) 74 - 99 mg/dL   POCT GLUCOSE   Result Value Ref Range    POCT Glucose 190 (H) 74 - 99 mg/dL   POCT GLUCOSE   Result Value Ref Range    POCT " Glucose 228 (H) 74 - 99 mg/dL   POCT GLUCOSE   Result Value Ref Range    POCT Glucose 200 (H) 74 - 99 mg/dL        Imaging  No results found.     Psychiatric Risk Assessment  Acute Risk of Harm to Others is Considered: Low  Acute Risk of Harm to Self is Considered: Moderate    Assessment:  59M history of schizophrenia (bipolar?) and diabetes, who returned to ED from inpatient psych for medical exam due to worsening alterations in mentation, refusal to take PO, and unstable vital signs. Psychiatry was consulted on 11/6 for psychiatric evaluation. His outpatient medication regimen appears to be benztropine 0.5 mg BID, carbamazepine 400 mg nightly, clozapine 500 mg nightly, propranolol 10 mg TID.     11/13: Patient seen this morning on rounds.  Notable improvement in rigidity despite no ativan overnight, however with persistence of tremors.  Patient continued to have some very mildly pressured speech today.  Patient does appear to have some insight into the need for treatment, but does continue to have disorganized thinking, and remains highly guarded. However, he does show some intent to continue taking clozapine. We will continue to work with the patient and try and hopefully find a way to get him to consistently take the medications. In terms of his current medications we will not change the dose of clozapine and leave it at 25 mg p.o. twice daily. He does not appear to be worsening from a catatonia perspective despite missing ativan doses overnight, but may need IV placement if he does worsen and continues to refuse IM ativan.     Continues to meet criteria for inpatient psychiatry given previous decompensation and the need for re-titration of his medications.    Impression:  Catatonia   Schizophrenia    Recommendations:  Safety/Monitoring:  Patient meets criteria for inpatient psychiatric admission  Patient does not require 1:1 observation from a psychiatric perspective, defer to primary team  Daily  interdisciplinary safety and planning huddle with Psychiatry  Video monitoring as with all patients on the MPU  Psychiatry will follow patient daily while on the MPU    Medications:  - Continue clozapine at 25 mg BID, can use ODT given issues with PO (must use clozapine order panel)    - Continue scheduled ativan 1mg Q6 IV  - Continue Bowel Regimen    Considerations:  Weekly ANC given he is on clozapine  Weekly Troponins    Therapies recommended:  art therapy, music therapy, pet therapy, recreational therapy, and will continue to assess for benefit and meaningful participation    Delirium Guidelines  Provide glasses, hearing aids and/or communication boards as needed for impairments  Frequent reorientation, minimize room and staff changes  Open blinds during the day, dark/quiet room at night   Minimal interruptions and daytime naps  Early evaluation and intervention by PT, out of bed as tolerated  Minimize use of restraints   Minimize use of benzodiazepines, anticholinergic medications, and opiates (while ensuring adequate treatment of pain)  Keep Mg>2, K>4 (as able)  Ensure regular bowel and bladder function (as able)    Disposition/Discharge Planning:  SW following, appreciate assistance      Multidisciplinary Rounding  Consulting Physician, Fellow, Medical Student, Charge Nurse, Nurse, Pharmacist, Social Work, Care Coordinator, Dietician, Music Therapy, Art Therapy, Recreational Therapy, and     Medication Consent  N/A - Consult Service    Patient seen and discussed with fellow and attending, Dr. Israel and Dr. Vaca.   Naveen Gee MD  Family Medicine Resident.

## 2024-11-13 NOTE — CARE PLAN
The clinical goals for the shift include patient will be safe throughout shift    Over the shift, the patient did make progress toward the following goals.       Problem: Fall/Injury  Goal: Not fall by end of shift  Outcome: Progressing  Goal: Be free from injury by end of the shift  Outcome: Progressing  Goal: Verbalize understanding of personal risk factors for fall in the hospital  Outcome: Progressing  Goal: Verbalize understanding of risk factor reduction measures to prevent injury from fall in the home  Outcome: Progressing  Goal: Use assistive devices by end of the shift  Outcome: Progressing  Goal: Pace activities to prevent fatigue by end of the shift  Outcome: Progressing     Problem: Skin  Goal: Decreased wound size/increased tissue granulation at next dressing change  Outcome: Progressing  Goal: Participates in plan/prevention/treatment measures  Outcome: Progressing  Goal: Prevent/manage excess moisture  Outcome: Progressing  Goal: Prevent/minimize sheer/friction injuries  Outcome: Progressing  Goal: Promote/optimize nutrition  Outcome: Progressing  Goal: Promote skin healing  Outcome: Progressing

## 2024-11-13 NOTE — CARE PLAN
The patient's goals for the shift include      The clinical goals for the shift include Pt will remain free of falls and injury     Over the shift, the patient remained free of falls and injury. Pt continues to refuse all PO meds, pt educated and continues to refuse. Md aware. Pt in room up in chair call light within reach.

## 2024-11-13 NOTE — PROGRESS NOTES
Clayton Parra is a 59 y.o. y.o. male on day 7 of admission presenting with Altered mental status, unspecified altered mental status type [R41.82].     Subjective   Patient remains medically cleared for discharge and continues to meet criteria for inpatient psychiatry. Patient was accepted at [facility] for inpatient psychiatry yesterday but then notified that patient is out of bed days. EPAT actively working on finding an accepting hospital based unit due to this. Discussed with psychiatry as there is a possibility that patient will need to remain on MPU until cleared.    Call placed to patients sister/legal guardian Mehnaz ([phone]) to provide an update. She reports that she is going to visit [facility] this afternoon and plans to reach out to [facility] to complete a tour. Dicussed that these are the only facilities that have accepted that have secured behavioral units. If sister is not agreeable to either of these, she is aware that patient will need to return to [facility] where he is a bed hold.    Primary MD and Psychiatry updated on above; TCC/SW will continue to follow.    - Halina QUIJANO, MA, LSW  Care Transitions   [facility] Secure Chat or [phone]

## 2024-11-14 LAB
GLUCOSE BLD MANUAL STRIP-MCNC: 217 MG/DL (ref 74–99)
GLUCOSE BLD MANUAL STRIP-MCNC: 236 MG/DL (ref 74–99)
GLUCOSE BLD MANUAL STRIP-MCNC: 245 MG/DL (ref 74–99)

## 2024-11-14 PROCEDURE — 2500000004 HC RX 250 GENERAL PHARMACY W/ HCPCS (ALT 636 FOR OP/ED): Performed by: STUDENT IN AN ORGANIZED HEALTH CARE EDUCATION/TRAINING PROGRAM

## 2024-11-14 PROCEDURE — 2500000002 HC RX 250 W HCPCS SELF ADMINISTERED DRUGS (ALT 637 FOR MEDICARE OP, ALT 636 FOR OP/ED): Performed by: STUDENT IN AN ORGANIZED HEALTH CARE EDUCATION/TRAINING PROGRAM

## 2024-11-14 PROCEDURE — 99232 SBSQ HOSP IP/OBS MODERATE 35: CPT | Performed by: STUDENT IN AN ORGANIZED HEALTH CARE EDUCATION/TRAINING PROGRAM

## 2024-11-14 PROCEDURE — 99232 SBSQ HOSP IP/OBS MODERATE 35: CPT

## 2024-11-14 PROCEDURE — 1100000001 HC PRIVATE ROOM DAILY

## 2024-11-14 PROCEDURE — 82947 ASSAY GLUCOSE BLOOD QUANT: CPT

## 2024-11-14 RX ORDER — DOCUSATE SODIUM 100 MG/1
100 CAPSULE, LIQUID FILLED ORAL DAILY
Status: DISCONTINUED | OUTPATIENT
Start: 2024-11-15 | End: 2024-11-26 | Stop reason: HOSPADM

## 2024-11-14 RX ORDER — LORAZEPAM 2 MG/ML
2 INJECTION INTRAMUSCULAR ONCE
Status: DISCONTINUED | OUTPATIENT
Start: 2024-11-14 | End: 2024-11-16

## 2024-11-14 RX ORDER — LORAZEPAM 2 MG/ML
1 INJECTION INTRAMUSCULAR ONCE
Status: DISCONTINUED | OUTPATIENT
Start: 2024-11-14 | End: 2024-11-14

## 2024-11-14 RX ORDER — HALOPERIDOL DECANOATE 100 MG/ML
300 INJECTION INTRAMUSCULAR
Status: DISCONTINUED | OUTPATIENT
Start: 2024-11-14 | End: 2024-11-26 | Stop reason: HOSPADM

## 2024-11-14 ASSESSMENT — PAIN SCALES - GENERAL
PAINLEVEL_OUTOF10: 0 - NO PAIN
PAINLEVEL_OUTOF10: 0 - NO PAIN

## 2024-11-14 ASSESSMENT — COLUMBIA-SUICIDE SEVERITY RATING SCALE - C-SSRS
1. SINCE LAST CONTACT, HAVE YOU WISHED YOU WERE DEAD OR WISHED YOU COULD GO TO SLEEP AND NOT WAKE UP?: NO
2. HAVE YOU ACTUALLY HAD ANY THOUGHTS OF KILLING YOURSELF?: NO
6. HAVE YOU EVER DONE ANYTHING, STARTED TO DO ANYTHING, OR PREPARED TO DO ANYTHING TO END YOUR LIFE?: NO

## 2024-11-14 NOTE — PROGRESS NOTES
Clayton Parra is a 59 y.o. y.o. male on day 8 of admission presenting with Altered mental status, unspecified altered mental status type [R41.82].     Subjective   Patient remains medically ready for discharge and is continues to meet inpatient psychiatry criteria. Patient is out of bed days for psychiatric admission and EPAT working on finding an accepting hospital unit. Discussed with psychiatry and informed that patient may be cleared psychiatrically from U.    Patient is a bed hold at [facility] but patients sister/legal guardian Mehnaz Parra interested in switching facilities due to concerns regarding the care he is receiving. Sister was scheduled to visit [facility] yesterday afternoon. Attempted to touch base with her but LM requesting return call.    - Halina QUIJANO, MA, LSW  Care Transitions   [facility] Secure Chat or [phone]

## 2024-11-14 NOTE — PROGRESS NOTES
"Clayton Parra is a 59 y.o. male on hospital day 8 of admission presenting with Altered mental status, unspecified altered mental status type.    Subjective   Patient seen seated in bedside chair with the psychiatry team. Consistently states he is positive. Does not respond appropriately to questions and difficult to engage or understand responses due to mumbling. He does endorse consistent \"eee\" voice that he is hearing, but denies hallucinations.     Objective   Upon physical examination there was a mild tremor present bilaterally.  There was no significant rigidity in the upper limbs or lower limbs present.    Vital Signs:      11/12/2024     5:23 PM 11/12/2024     9:13 PM 11/13/2024    12:05 AM 11/13/2024     7:29 AM 11/13/2024     4:46 PM 11/13/2024    11:19 PM 11/14/2024     8:15 AM   Vitals   Systolic 134 159 127 140 147 131 147   Diastolic 75 91 73 79 87 59 88   Heart Rate 80 76 65 69 70 65 83   Temp 36 °C (96.8 °F) 36.4 °C (97.5 °F) 36.7 °C (98.1 °F) 36.6 °C (97.9 °F) 36.7 °C (98.1 °F) 36.6 °C (97.9 °F) 36.7 °C (98.1 °F)   Resp 17 18  17   18      Intake/Output last 3 Shifts:  No intake/output data recorded.    Mental Status Exam:  General/Appearance: Moderate Psychological Distress, appears younger than stated age, in hospital gown, sitting on bedside chair , body habitus: overweight, grooming/hygiene is reasonable for setting, bald  Attitude/Behavior: engages in interview, suspicious, secretive, appropriate eye contact, calm, responds inappropriately to questions, appears to be responding to internal stimuli  Motor Activity: psychomotor agitation, tics/tremors - present in upper limbs , gait: not assessed  Mood: unable to assess  Affect: Quality-fearful, Intensity-energized, Range-constricted  Speech:  fast rate, normal rhythm, normal tone, lowered volume, fast prosody, pressured  Thought Process: disorganized, derailment, vague  Thought Content: unable to assess, Delusional thinking: " paranoid  Thought Perception: endorses auditory hallucinations  Cognition: no overt deficits noted, not formally assessed  Insight: impaired  Judgment: poor    Current Medications  Scheduled   aspirin, 81 mg, oral, Daily  atorvastatin, 40 mg, oral, Nightly  cloZAPine, 25 mg, oral, BID  docusate sodium, 100 mg, oral, BID  enoxaparin, 40 mg, subcutaneous, q24h  ferrous sulfate (325 mg ferrous sulfate), 65 mg of iron, oral, Daily with breakfast  insulin glargine, 15 Units, subcutaneous, Nightly  insulin lispro, 0-10 Units, subcutaneous, q6h  insulin lispro, 18 Units, subcutaneous, TID AC  lisinopril, 20 mg, oral, Daily  LORazepam, 1 mg, intramuscular, q6h YUNG  [Held by provider] metFORMIN, 1,000 mg, oral, BID  polyethylene glycol, 17 g, oral, Daily  propranolol, 10 mg, oral, TID  thiamine, 100 mg, oral, Daily      Continuous      PRN   PRN medications: dextrose, dextrose, dextrose, dextrose, glucagon, glucagon, glucagon, glucagon, LORazepam, LORazepam, metoprolol     Labs  Results for orders placed or performed during the hospital encounter of 11/06/24 (from the past 24 hours)   POCT GLUCOSE   Result Value Ref Range    POCT Glucose 216 (H) 74 - 99 mg/dL   POCT GLUCOSE   Result Value Ref Range    POCT Glucose 115 (H) 74 - 99 mg/dL   POCT GLUCOSE   Result Value Ref Range    POCT Glucose 236 (H) 74 - 99 mg/dL        Imaging  No results found.     Psychiatric Risk Assessment  Acute Risk of Harm to Others is Considered: Low  Acute Risk of Harm to Self is Considered: Moderate    Assessment:  59M history of schizophrenia (bipolar?) and diabetes, who returned to ED from inpatient psych for medical exam due to worsening alterations in mentation, refusal to take PO, and unstable vital signs. Psychiatry was consulted on 11/6 for psychiatric evaluation. His outpatient medication regimen appears to be benztropine 0.5 mg BID, carbamazepine 400 mg nightly, clozapine 500 mg nightly, propranolol 10 mg TID.     11/14: Patient seen this  morning on rounds. Remains stable from catatonia standpoint, however continues to have some pressured speech today. He does also appear paranoid and appears to be responding to internal stimuli. Speech is also disorganized with continuous derailment and now in apparent psychosis. Continues to refuse clozapine, despite showing some intent to continue taking it yesterday, although he did receive an IM dose of ativan overnight. At this point, he would likely benefit more from increasing his DE LA CRUZ dose, as he has shown unlikeliness to consistently take clozapine, which would be unsafe and lower the long term efficacy. As he is already on haldol decanoate, with a last dose on 10/31, 300 mg can be ordered today, with the plan to continue this dose every 28 days.    Impression:  Psychosis  Catatonia   Schizophrenia    Recommendations:  Safety/Monitoring:  Patient meets criteria for inpatient psychiatric admission  Patient does not require 1:1 observation from a psychiatric perspective, defer to primary team  Daily interdisciplinary safety and planning huddle with Psychiatry  Video monitoring as with all patients on the MPU  Psychiatry will follow patient daily while on the MPU    Medications:  - Discontinue clozapine  - Discontinue scheduled ativan 1mg Q6 IV  - Discontinue ativan 1 mg IM q6h PRN severe agitation  - Start 300 mg haldol decanoate every 28 days to be given today (11/14)  - Continue IV ativan 1 mg Q8h PRN agitation, can add PO/IM if no IV access    Considerations:  - bowel regimen per primary given clozapine discontinuation    Therapies recommended:  art therapy, music therapy, pet therapy, recreational therapy, and will continue to assess for benefit and meaningful participation    Delirium Guidelines  Provide glasses, hearing aids and/or communication boards as needed for impairments  Frequent reorientation, minimize room and staff changes  Open blinds during the day, dark/quiet room at night   Minimal  interruptions and daytime naps  Early evaluation and intervention by PT, out of bed as tolerated  Minimize use of restraints   Minimize use of benzodiazepines, anticholinergic medications, and opiates (while ensuring adequate treatment of pain)  Keep Mg>2, K>4 (as able)  Ensure regular bowel and bladder function (as able)    Disposition/Discharge Planning:  SW following, appreciate assistance      Multidisciplinary Rounding  Consulting Physician, Fellow, Medical Student, Charge Nurse, Nurse, Pharmacist, Social Work, Care Coordinator, Dietician, Music Therapy, Art Therapy, Recreational Therapy, and     Medication Consent  N/A - Consult Service    Patient seen and discussed with fellow and attending, Dr. Israel and Dr. Evans.   Naveen Gee MD  Family Medicine Resident.

## 2024-11-14 NOTE — NURSING NOTE
Pt refused all oral meds but was able to redirect patient and he took insulin and IM ativan. No other meds were given. Provider aware.

## 2024-11-14 NOTE — CARE PLAN
The clinical goals for the shift include Pt will remain safe and free from injuries during this shift.      Problem: Fall/Injury  Goal: Not fall by end of shift  Outcome: Progressing     Problem: Skin  Goal: Decreased wound size/increased tissue granulation at next dressing change  Outcome: Progressing

## 2024-11-14 NOTE — PROGRESS NOTES
.                                                                                                                                                                                                                                                                                             INTERNAL MEDICINE PROGRESS NOTE     BRIEF NARRATIVE      Clayton Parra is a 59 y.o. male on day 8 of admission presenting with Altered mental status, unspecified altered mental status type.    Pt with significant psychiatric history including schizophrenia/schizoaffective/bipolar with apparent home medications ( haldol IV 100mg monthly, carbamazepine 400 mg qhs, clozapine 500 mg qhs,  benztropine 0.5 mg BID, propanolol 10mg TID), T2DM  HLD, anemia, HTN, who originally presented to the ED a couple days ago from [facility] for refusing his psych medications.  Was deemed to not have capacity and sent to [facility] for psych admission.  Upon arrival at [facility] patient was found to be alert but nonverbal and not oriented to self place or time.  On arrival to the ED he was found to be diaphoretic rigid.  At the time of this exam he was again diaphoretic and rigid nonverbal his eyes were open but he did not respond to questions and he responded very lightly to noxious stimuli.  While in the ED his vitals were stable.  They attempted an LP unsuccessfully.  His labs were WNL.  CT C/A/P and CT of the head were negative for any acute issues CXR was also negative.  Patient was admitted to MPU for  encephalopathy (resolved).    -Given 300 mg of haldol decanoate (DE LA CRUZ) 11/14 and then will be continuing that every 28 days.    -Clozapine can be stopped completely per psych recs. Scheduled ativan can stopped  -Reduce the bowel regimen.  -Dispo: IS. Requested EKG.  Medically clear.         ASSESSMENT / PLANS      #Catatonia - resolved    #Acute encephalopathy, multifactorial, psychosis v toxic v infectious - resolved   #H/o  Schizophrenia   #Xanthochromic CSF without evidence of SAH   Etiology of a catatonia is most likely secondary to antipsychotic. However differential includes possible NMS v serotonin syndrome (tachycardia, hypertension, rigidity, mixed clonus and hyperreflexia), meningitis/ encephalitis less likely (negative white count, bcx neg, nuchal rigidity noted but  Kernig and Brudzinski signs negative).   CT head on 11/6 negative for intracranial hemorrhage   - S/p Acyclovir, ampicillin, ceftriaxone and vancomycin in the ED for empiric meningitis coverage. Bcx, WBC negative, afebrile, procal neg. No infectious source identified so far, Will not initiate abx at the moment.    - LP 11/8 : Neutrophils 42, protein 83, glucose 151, RBC 3000.   - Psych following   - Pt currently meet inpatient psych admission   - Patient is medically clear for impatient psych admission    - Cont fall precaution   - 1:1 sitter  as needed   - Discussed with patient sister (the guardian) on 11/8 and updated. At baseline patient does ambulate. Sister requested placement to a different facility   - PT/OT      > T2DM  - Hold home metformin  - Home insulin regimen: Lantus 70 units nightly, Humalog 18 units 3 times daily with meals  - BG fluctuating between 127- 262. For now will continue Lantus 10u nightly , Lispro 18u TID with meals and #1 SSI. Goal -180   - Cont accucheck      > HTN  > HLD  -Will hold home aspirin and atorvastatin and lisinopril for now until patient is able to take p.o.  - Cont IV lopressor 5mg for SBP > 160  DBP > 110      > Anemia  -Hgb stable  -Will hold home iron     Fluids: monitor and replete as needed  Electrolytes: monitor and replete as needed  Nutrition: Regular diet   GI prophylaxis: as needed  DVT prophylaxis: SCD  Code Status: full code    SUBJECTIVE       Patient seen and examined          40 min     OBJECTIVE      Visit Vitals  /87   Pulse 83   Temp 36.6 °C (97.9 °F)   Resp 18      No intake or output data  in the 24 hours ending 11/14/24 1625    Physical Exam  Constitutional:       Appearance: He is ill-appearing and toxic-appearing.   HENT:      Head: Normocephalic and atraumatic.      Right Ear: Tympanic membrane normal.      Left Ear: Tympanic membrane normal.   Eyes:      Pupils: Pupils are equal, round, and reactive to light.   Cardiovascular:      Rate and Rhythm: Tachycardia present.   Abdominal:      Palpations: Abdomen is soft.   Musculoskeletal:         General: Normal range of motion.      Cervical back: Rigidity present.   Skin:     Capillary Refill: Capillary refill takes less than 2 seconds.   Neurological:      Mental Status: He is disoriented.        Current Meds   aspirin, 81 mg, oral, Daily  atorvastatin, 40 mg, oral, Nightly  [START ON 11/15/2024] docusate sodium, 100 mg, oral, Daily  enoxaparin, 40 mg, subcutaneous, q24h  ferrous sulfate (325 mg ferrous sulfate), 65 mg of iron, oral, Daily with breakfast  haloperidol decanoate, 300 mg, intramuscular, q28 days  insulin glargine, 15 Units, subcutaneous, Nightly  insulin lispro, 0-10 Units, subcutaneous, q6h  insulin lispro, 18 Units, subcutaneous, TID AC  lisinopril, 20 mg, oral, Daily  [Held by provider] metFORMIN, 1,000 mg, oral, BID  polyethylene glycol, 17 g, oral, Daily  propranolol, 10 mg, oral, TID  thiamine, 100 mg, oral, Daily       PRN medications: dextrose, dextrose, dextrose, dextrose, glucagon, glucagon, glucagon, glucagon, LORazepam, metoprolol     LABS and IMAGING     WBC   Date Value Ref Range Status   11/12/2024 4.9 4.4 - 11.3 x10*3/uL Final     Hemoglobin   Date Value Ref Range Status   11/12/2024 12.0 (L) 13.5 - 17.5 g/dL Final     Hematocrit   Date Value Ref Range Status   11/12/2024 35.3 (L) 41.0 - 52.0 % Final     Bicarbonate   Date Value Ref Range Status   11/12/2024 29 21 - 32 mmol/L Final     Creatinine   Date Value Ref Range Status   11/12/2024 0.77 0.50 - 1.30 mg/dL Final     Calcium   Date Value Ref Range Status    11/12/2024 8.4 (L) 8.6 - 10.6 mg/dL Final       FL guided lumbar puncture  Narrative: Interpreted By:  Maurisio Rodriguez and Stevens Alex   STUDY:  FL GUIDED LUMBAR PUNCTURE;  11/8/2024 2:47 pm      INDICATION:  Signs/Symptoms:lumbar puncture-- several attempts in the ED.          COMPARISON:  None.      ACCESSION NUMBER(S):  [id number]      ORDERING CLINICIAN:  LUIS CORTÉS      TECHNIQUE:  After discussion of risks, benefits, and alternatives, oral and  written informed consent was obtained. The patient was placed in  prone position on exam table. The L3-4 interspace was then marked  under fluoroscopy. The patient was then prepped and draped in sterile  fashion. Local anesthesia was achieved with 2% Lidocaine solution. A  20 gauge spinal needle was then introduced at the L3-L4 level under  direct fluoroscopic guidance until return of CSF was demonstrated. 10  ml of clear CSF was collected in 4 tubes. The stylet was then  replaced and the spinal needle was removed.  Hemostasis was achieved  with direct pressure and the area was dressed with a Band-Aid. The  patient tolerated procedure well without any immediate or clinically  apparent complications. Total fluoroscopy time was 0.5 MINUTES.      The collected CSF was then sent to the lab for appropriate lab tests  as ordered by the referring physician.      FINDINGS:  A single periprocedural spot fluoroscopic image was provided for  interpretation. Image quality is such that fine bony detail is  obscured. A needle is seen to overlie the posterior elements of L4.      Impression: Successful fluoroscopic guided lumbar puncture.      I personally reviewed the images/study and I agree with the findings  as stated by Endy Boss DO PGY-2. This study was interpreted at  [place], [place].      MACRO:  None      Signed by: Maurisio Rodriguez 11/11/2024 2:34 PM  Dictation workstation:   [id number]         PROBLEM LISTS       Problem List Items Addressed This Visit       * (Principal) Altered mental status, unspecified altered mental status type - Primary       This dictation was created with voice recognition software. Although every effort is made to review the dictation as it is transcribed, on occasion spoken words, phrases, names, numbers, punctuation etc can be misinterpreted by the the technology leading to omissions or inappropriate outcomes.     Rick Cadet MD

## 2024-11-14 NOTE — NURSING NOTE
This nurse tired to medicate patient to receive EKG. Patient refused multiple times. Provider aware.

## 2024-11-14 NOTE — CARE PLAN
Problem: Fall/Injury  Goal: Not fall by end of shift  Outcome: Progressing     Problem: Fall/Injury  Goal: Be free from injury by end of the shift  Outcome: Progressing     Problem: Fall/Injury  Goal: Verbalize understanding of personal risk factors for fall in the hospital  Outcome: Progressing     Problem: Fall/Injury  Goal: Verbalize understanding of risk factor reduction measures to prevent injury from fall in the home  Outcome: Progressing     Problem: Fall/Injury  Goal: Pace activities to prevent fatigue by end of the shift  Outcome: Progressing     Problem: Skin  Goal: Participates in plan/prevention/treatment measures  Outcome: Progressing  Flowsheets (Taken 11/13/2024 2200)  Participates in plan/prevention/treatment measures: Elevate heels     Problem: Skin  Goal: Prevent/manage excess moisture  Outcome: Progressing     Problem: Skin  Goal: Prevent/minimize sheer/friction injuries  Outcome: Progressing   The patient's goals for the shift include      The clinical goals for the shift include Pt will remain safe and free from injuries during this shift.    Over the shift, the patient did not make progress toward the following goals. Barriers to progression includes, not compliance with care and medications. No PRNs administered. Pt was up mostly during the night. Pacing, upset and fully dressed in street clothes. Recommendations to address these barriers include address these barriers and offer care and medications as ordered. Maintain safety.

## 2024-11-15 LAB
BACTERIA CSF CULT: NORMAL
GLUCOSE BLD MANUAL STRIP-MCNC: 162 MG/DL (ref 74–99)
GLUCOSE BLD MANUAL STRIP-MCNC: 205 MG/DL (ref 74–99)
GLUCOSE BLD MANUAL STRIP-MCNC: 220 MG/DL (ref 74–99)
GLUCOSE BLD MANUAL STRIP-MCNC: 290 MG/DL (ref 74–99)
GRAM STN SPEC: NORMAL
GRAM STN SPEC: NORMAL

## 2024-11-15 PROCEDURE — 99232 SBSQ HOSP IP/OBS MODERATE 35: CPT | Performed by: STUDENT IN AN ORGANIZED HEALTH CARE EDUCATION/TRAINING PROGRAM

## 2024-11-15 PROCEDURE — 2500000001 HC RX 250 WO HCPCS SELF ADMINISTERED DRUGS (ALT 637 FOR MEDICARE OP): Performed by: STUDENT IN AN ORGANIZED HEALTH CARE EDUCATION/TRAINING PROGRAM

## 2024-11-15 PROCEDURE — 82947 ASSAY GLUCOSE BLOOD QUANT: CPT

## 2024-11-15 PROCEDURE — 2500000002 HC RX 250 W HCPCS SELF ADMINISTERED DRUGS (ALT 637 FOR MEDICARE OP, ALT 636 FOR OP/ED): Performed by: STUDENT IN AN ORGANIZED HEALTH CARE EDUCATION/TRAINING PROGRAM

## 2024-11-15 PROCEDURE — 2500000004 HC RX 250 GENERAL PHARMACY W/ HCPCS (ALT 636 FOR OP/ED): Performed by: STUDENT IN AN ORGANIZED HEALTH CARE EDUCATION/TRAINING PROGRAM

## 2024-11-15 PROCEDURE — 1100000001 HC PRIVATE ROOM DAILY

## 2024-11-15 RX ORDER — INSULIN GLARGINE 100 [IU]/ML
20 INJECTION, SOLUTION SUBCUTANEOUS NIGHTLY
Status: DISCONTINUED | OUTPATIENT
Start: 2024-11-15 | End: 2024-11-26 | Stop reason: HOSPADM

## 2024-11-15 ASSESSMENT — PAIN SCALES - GENERAL
PAINLEVEL_OUTOF10: 0 - NO PAIN
PAINLEVEL_OUTOF10: 0 - NO PAIN

## 2024-11-15 NOTE — PROGRESS NOTES
Clayton Parra is a 59 y.o. y.o. male on day 9 of admission presenting with Altered mental status, unspecified altered mental status type [R41.82].     Subjective   Patient remains medically ready for discharge; informed that patient is no longer meeting criteria for inpatient psychiatry as of this morning. Patient is a bed hold at [facility] but sister requesting new placement due to concerns that patient is not receiving adequate care. Patient was accepted at [facility] and [facility] which both have secured behavioral units.     Call placed to patients sister/legal guardian Mehnaz ([phone]); she reports that she will outreach [facility] today to see if she is able to complete a visit. Mehnaz reports that she was not satisfied with onsite at [facility]. Aware that patient is now cleared for discharge and placement is a priority.     SW will continue to follow.    UPDATE 1340 Received phone call from patients sister/legal guardian- she reports that she will complete tour of [facility] tomorrow between 9:30-10:00am. She states that she will update  following visit regarding if she wants to move forward with the facility.     SW will continue to follow.    - Halina QUIJANO, MA, LSW  Care Transitions   [facility] Secure Chat or [phone]

## 2024-11-15 NOTE — NURSING NOTE
Pt refused AM meds except Aspirin and Insulin. Pt educated  1225: Pt refused afternoon Insulin administration

## 2024-11-15 NOTE — PROGRESS NOTES
.                                                                                                                                                                                                                                                                                             INTERNAL MEDICINE PROGRESS NOTE     BRIEF NARRATIVE      Clayton Parra is a 59 y.o. male on day 9 of admission presenting with Altered mental status, unspecified altered mental status type.    Pt with significant psychiatric history including schizophrenia/schizoaffective/bipolar with apparent home medications ( haldol IV 100mg monthly, carbamazepine 400 mg qhs, clozapine 500 mg qhs,  benztropine 0.5 mg BID, propanolol 10mg TID), T2DM  HLD, anemia, HTN, who originally presented to the ED a couple days ago from [facility] for refusing his psych medications.  Was deemed to not have capacity and sent to [facility] for psych admission.  Upon arrival at [facility] patient was found to be alert but nonverbal and not oriented to self place or time.  On arrival to the ED he was found to be diaphoretic rigid.  At the time of this exam he was again diaphoretic and rigid nonverbal his eyes were open but he did not respond to questions and he responded very lightly to noxious stimuli.  While in the ED his vitals were stable.  They attempted an LP unsuccessfully.  His labs were WNL.  CT C/A/P and CT of the head were negative for any acute issues CXR was also negative.  Patient was admitted to [facility] for  encephalopathy (resolved).        11/15  -Per psych, no need for inpt psych admission  -Keep with Haldol Dec 300mg q28 days monotherapy.   -SW to follow up with the sister Laure Andres, likely discharge by Monday.        11/14  -Given 300 mg of haldol decanoate ([place]) 11/14 and then will be continuing that every 28 days.    -Clozapine can be stopped completely per psych recs. Scheduled ativan can stopped  -Reduce the bowel  regimen.  -Dispo: IS. Requested EKG.  Medically clear.         ASSESSMENT / PLANS      #Catatonia - resolved    #Acute encephalopathy, multifactorial, psychosis v toxic v infectious - resolved   #H/o Schizophrenia   #Xanthochromic CSF without evidence of SAH   Etiology of a catatonia is most likely secondary to antipsychotic. However differential includes possible NMS v serotonin syndrome (tachycardia, hypertension, rigidity, mixed clonus and hyperreflexia), meningitis/ encephalitis less likely (negative white count, bcx neg, nuchal rigidity noted but  Kernig and Brudzinski signs negative).   CT head on 11/6 negative for intracranial hemorrhage   - S/p Acyclovir, ampicillin, ceftriaxone and vancomycin in the ED for empiric meningitis coverage. Bcx, WBC negative, afebrile, procal neg. No infectious source identified so far, Will not initiate abx at the moment.    - LP 11/8 : Neutrophils 42, protein 83, glucose 151, RBC 3000.   - Psych following   - Pt currently meet inpatient psych admission   - Patient is medically clear for impatient psych admission    - Cont fall precaution   - 1:1 sitter  as needed   - Discussed with patient sister (the guardian) on 11/8 and updated. At baseline patient does ambulate. Sister requested placement to a different facility   - PT/OT      > T2DM  - Hold home metformin  - Home insulin regimen: Lantus 70 units nightly, Humalog 18 units 3 times daily with meals  - BG fluctuating between 127- 262. For now will continue Lantus 10u nightly , Lispro 18u TID with meals and #1 SSI. Goal -180   - Cont accucheck      > HTN  > HLD  -Will hold home aspirin and atorvastatin and lisinopril for now until patient is able to take p.o.  - Cont IV lopressor 5mg for SBP > 160  DBP > 110      > Anemia  -Hgb stable  -Will hold home iron     Fluids: monitor and replete as needed  Electrolytes: monitor and replete as needed  Nutrition: Regular diet   GI prophylaxis: as needed  DVT prophylaxis: SCD  Code  "Status: full code    SUBJECTIVE       Patient seen and examined          40 min     OBJECTIVE      Visit Vitals  /86   Pulse 81   Temp 36.4 °C (97.5 °F)   Resp 17      No intake or output data in the 24 hours ending 11/15/24 1520    Physical Exam  Constitutional:       Appearance: He is ill-appearing and toxic-appearing.   HENT:      Head: Normocephalic and atraumatic.      Right Ear: Tympanic membrane normal.      Left Ear: Tympanic membrane normal.   Eyes:      Pupils: Pupils are equal, round, and reactive to light.   Cardiovascular:      Rate and Rhythm: Tachycardia present.   Abdominal:      Palpations: Abdomen is soft.   Musculoskeletal:         General: Normal range of motion.      Cervical back: Rigidity present.   Skin:     Capillary Refill: Capillary refill takes less than 2 seconds.   Neurological:      Mental Status: He is disoriented.        Current Meds   aspirin, 81 mg, oral, Daily  atorvastatin, 40 mg, oral, Nightly  docusate sodium, 100 mg, oral, Daily  enoxaparin, 40 mg, subcutaneous, q24h  ferrous sulfate (325 mg ferrous sulfate), 65 mg of iron, oral, Daily with breakfast  haloperidol decanoate, 300 mg, intramuscular, q28 days  insulin glargine, 20 Units, subcutaneous, Nightly  insulin lispro, 0-10 Units, subcutaneous, q6h  insulin lispro, 18 Units, subcutaneous, TID AC  lisinopril, 20 mg, oral, Daily  LORazepam, 2 mg, intramuscular, Once  [Held by provider] metFORMIN, 1,000 mg, oral, BID  polyethylene glycol, 17 g, oral, Daily  propranolol, 10 mg, oral, TID  thiamine, 100 mg, oral, Daily       PRN medications: dextrose, dextrose, dextrose, dextrose, glucagon, glucagon, glucagon, glucagon, LORazepam, metoprolol     LABS and IMAGING     No results found for: \"WBC\", \"HGB\", \"HCT\", \"GLU\", \"CO2\", \"CREATININE\", \"CALCIUM\", \"INR\", \"PTT\", \"TROPONINI\"      FL guided lumbar puncture  Narrative: Interpreted By:  Maurisio Rodriguez and Stevens Alex   STUDY:  FL GUIDED LUMBAR PUNCTURE;  11/8/2024 2:47 " pm      INDICATION:  Signs/Symptoms:lumbar puncture-- several attempts in the ED.          COMPARISON:  None.      ACCESSION NUMBER(S):  [id number]      ORDERING CLINICIAN:  LUIS CORTÉS      TECHNIQUE:  After discussion of risks, benefits, and alternatives, oral and  written informed consent was obtained. The patient was placed in  prone position on exam table. The L3-4 interspace was then marked  under fluoroscopy. The patient was then prepped and draped in sterile  fashion. Local anesthesia was achieved with 2% Lidocaine solution. A  20 gauge spinal needle was then introduced at the L3-L4 level under  direct fluoroscopic guidance until return of CSF was demonstrated. 10  ml of clear CSF was collected in 4 tubes. The stylet was then  replaced and the spinal needle was removed.  Hemostasis was achieved  with direct pressure and the area was dressed with a Band-Aid. The  patient tolerated procedure well without any immediate or clinically  apparent complications. Total fluoroscopy time was 0.5 MINUTES.      The collected CSF was then sent to the lab for appropriate lab tests  as ordered by the referring physician.      FINDINGS:  A single periprocedural spot fluoroscopic image was provided for  interpretation. Image quality is such that fine bony detail is  obscured. A needle is seen to overlie the posterior elements of L4.      Impression: Successful fluoroscopic guided lumbar puncture.      I personally reviewed the images/study and I agree with the findings  as stated by Endy Boss DO PGY-2. This study was interpreted at  [place], [place].      MACRO:  None      Signed by: Maurisio Rodriguez 11/11/2024 2:34 PM  Dictation workstation:   [id number]         PROBLEM LISTS      Problem List Items Addressed This Visit       * (Principal) Altered mental status, unspecified altered mental status type - Primary       This dictation was created with voice recognition  software. Although every effort is made to review the dictation as it is transcribed, on occasion spoken words, phrases, names, numbers, punctuation etc can be misinterpreted by the the technology leading to omissions or inappropriate outcomes.     Rick Cadet MD

## 2024-11-15 NOTE — PROGRESS NOTES
"Clayton Parra is a 59 y.o. male on day 9 of admission presenting with Altered mental status, unspecified altered mental status type.      Subjective   Pt seen in his room. States mood is \"great\" and that people here are taking ok care of him. Reports not sleeping at all at night due to \"praying\" by choice.  States finds that helpful. States that he is eating well. Reports voices are not bothering him today. Denies suicidal/homicidal ideation, adding \"No way!\"     Per RN, no acute events overnight. Pt did not require PRN medications. Last Ativen dose was 11/14 x 1 day - has not been receiving it consistently in recent days. Refusing most PO meds.       Objective       11/13/2024    12:05 AM 11/13/2024     7:29 AM 11/13/2024     4:46 PM 11/13/2024    11:19 PM 11/14/2024     8:15 AM 11/14/2024     3:13 PM 11/15/2024     8:25 AM   Vitals   Systolic 127 140 147 131 147 142 139   Diastolic 73 79 87 59 88 87 86   Heart Rate 65 69 70 65 83 83 81   Temp 36.7 °C (98.1 °F) 36.6 °C (97.9 °F) 36.7 °C (98.1 °F) 36.6 °C (97.9 °F) 36.7 °C (98.1 °F) 36.6 °C (97.9 °F) 36.4 °C (97.5 °F)   Resp  17   18  17     Mental Status Exam:  General/Appearance: NAD, appears stated age, casually dressed, body habitus: average, grooming/hygiene is good  Attitude/Behavior: engages in interview, appropriate eye contact  Motor Activity: no psychomotor agitation/retardation, no increased tone/cogwheeling in UE B/L , gait: stable, wide-based  Mood: \"great\"  Affect: Euthymic, restricted, mood congruent, appropriate to content  Speech: Spontaneous, coherent, fluent, appropriate quantity, volume, rate, rhythm, and tone - enunciation much clearer than any prior encounters  Thought Process: [place], generally logical  Thought Content: denies SI/HI, Delusional thinking:  some [employer] preoccupation  Thought Perception: denies AVH, not responding to internal stimuli  Cognition:  Grossly intact  Insight: poor  Judgment: poor  - pt has " guardian    Psychiatric Risk Assessment  Violence Risk Assessment: major mental illness and male  Acute Risk of Harm to Others is Considered: low   Suicide Risk Assessment: male  Protective Factors against Suicide: other has 24/7 care at facility, has guardian  Acute Risk of Harm to Self is Considered: low    Relevant Results  Scheduled medications  aspirin, 81 mg, oral, Daily  atorvastatin, 40 mg, oral, Nightly  docusate sodium, 100 mg, oral, Daily  enoxaparin, 40 mg, subcutaneous, q24h  ferrous sulfate (325 mg ferrous sulfate), 65 mg of iron, oral, Daily with breakfast  haloperidol decanoate, 300 mg, intramuscular, q28 days  insulin glargine, 20 Units, subcutaneous, Nightly  insulin lispro, 0-10 Units, subcutaneous, q6h  insulin lispro, 18 Units, subcutaneous, TID AC  lisinopril, 20 mg, oral, Daily  LORazepam, 2 mg, intramuscular, Once  [Held by provider] metFORMIN, 1,000 mg, oral, BID  polyethylene glycol, 17 g, oral, Daily  propranolol, 10 mg, oral, TID  thiamine, 100 mg, oral, Daily    Continuous medications     PRN medications  PRN medications: dextrose, dextrose, dextrose, dextrose, glucagon, glucagon, glucagon, glucagon, LORazepam, metoprolol      Results for orders placed or performed during the hospital encounter of 11/06/24 (from the past 24 hours)   POCT GLUCOSE   Result Value Ref Range    POCT Glucose 217 (H) 74 - 99 mg/dL   POCT GLUCOSE   Result Value Ref Range    POCT Glucose 245 (H) 74 - 99 mg/dL   POCT GLUCOSE   Result Value Ref Range    POCT Glucose 290 (H) 74 - 99 mg/dL       Assessment/Plan   59M history of schizophreniaand diabetes, who returned to ED from inpatient psych for medical exam due to worsening alterations in mentation, refusal to take PO, and unstable vital signs. Psychiatry was consulted on 11/6 for psychiatric evaluation. His outpatient medication regimen appears to be benztropine 0.5 mg BID, carbamazepine 400 mg nightly, clozapine 500 mg nightly, propranolol 10 mg TID.      11/15:  Patient received haldol decanoate yesterday at dose of 300mg (was due 200mg on 11/23 per home regimen) w/out incident. Today, pt w/out Sx of catatonia - interacting appropriately and engaging in care, although refusing PO meds which seems to be a chronic behavior for pt. No concerns for side effects from increased dose of Haldol Decanoate. No acute safety concerns. Considered whether pt requires/might benefit from inpt psychiatric admission, but given that he lives in an environment with 24/7 supervision inpt psychiatric admission is NOT the least restrictive environment for this pt and he is unlikely to benefit from further medication changes until we can observe the effects of the increased dose of Haldol Decanoate. Therefore, pt does not require inpt psychiatric admission at this time.     Impression:  Catatonia - resolved  Acute exacerbation of chronic schizophrenia - resolving     Recommendations:  Safety/Monitoring:  Patient does NOT meet criteria for inpatient psychiatric admission - pt clear from psych perspective to return to LTC when medically ready  Patient does not require 1:1 observation from a psychiatric perspective, defer to primary team  Daily interdisciplinary safety and planning huddle with Psychiatry  Video monitoring as with all patients on the MPU  Psychiatry will follow patient daily while on the MPU     Medications:  - Continue haldol decanoate 300mg IM every 28 days for schizophrenia, next due (12/12/2024)  - Reasonable to discontinue Propranolol 10 TID from psych perspective as pt not taking.  - Continue Ativan 1mg IV (vs PO/IM if no IV access) q8H PRN agitation     Therapies recommended:  art therapy, music therapy, pet therapy, recreational therapy, and will continue to assess for benefit and meaningful participation     Delirium Guidelines  Provide glasses, hearing aids and/or communication boards as needed for impairments  Frequent reorientation, minimize room and staff changes  Open  blinds during the day, dark/quiet room at night   Minimal interruptions and daytime naps  Early evaluation and intervention by PT, out of bed as tolerated  Minimize use of restraints   Minimize use of benzodiazepines, anticholinergic medications, and opiates (while ensuring adequate treatment of pain)  Keep Mg>2, K>4 (as able)  Ensure regular bowel and bladder function (as able)     Disposition/Discharge Planning:  SW following, appreciate assistance      Multidisciplinary Rounding  Consulting Physician, Fellow, Medical Student, Charge Nurse, Nurse, Pharmacist, Social Work, Care Coordinator, Dietician, Music Therapy, Art Therapy, Recreational Therapy, and      Medication Consent  N/A - Consult Service    Lizet Evans DO

## 2024-11-15 NOTE — CARE PLAN
Problem: Fall/Injury  Goal: Not fall by end of shift  Outcome: Progressing     Problem: Fall/Injury  Goal: Be free from injury by end of the shift  Outcome: Progressing     Problem: Fall/Injury  Goal: Verbalize understanding of personal risk factors for fall in the hospital  Outcome: Progressing     Problem: Fall/Injury  Goal: Verbalize understanding of risk factor reduction measures to prevent injury from fall in the home  Outcome: Progressing     Problem: Skin  Goal: Prevent/minimize sheer/friction injuries  Outcome: Progressing  Flowsheets (Taken 11/14/2024 2100)  Prevent/minimize sheer/friction injuries: HOB 30 degrees or less   The patient's goals for the shift include      The clinical goals for the shift include Pt will remain safe and free from injuries during this shift. Pt will be compliant with his medications.    Over the shift, the patient did not make progress toward the following goals. Barriers to progression include non compliance with most of his medications and care. Recommendations to address these barriers include keep offering meds as ordered and provide education. Also, offer care. Pt slept most of the night and No PRNs were given during this shift.

## 2024-11-16 LAB
GLUCOSE BLD MANUAL STRIP-MCNC: 234 MG/DL (ref 74–99)
GLUCOSE BLD MANUAL STRIP-MCNC: 240 MG/DL (ref 74–99)
GLUCOSE BLD MANUAL STRIP-MCNC: 240 MG/DL (ref 74–99)

## 2024-11-16 PROCEDURE — 2500000001 HC RX 250 WO HCPCS SELF ADMINISTERED DRUGS (ALT 637 FOR MEDICARE OP): Performed by: STUDENT IN AN ORGANIZED HEALTH CARE EDUCATION/TRAINING PROGRAM

## 2024-11-16 PROCEDURE — 99231 SBSQ HOSP IP/OBS SF/LOW 25: CPT | Performed by: STUDENT IN AN ORGANIZED HEALTH CARE EDUCATION/TRAINING PROGRAM

## 2024-11-16 PROCEDURE — 1100000001 HC PRIVATE ROOM DAILY

## 2024-11-16 PROCEDURE — 99232 SBSQ HOSP IP/OBS MODERATE 35: CPT | Performed by: PSYCHIATRY & NEUROLOGY

## 2024-11-16 PROCEDURE — 82947 ASSAY GLUCOSE BLOOD QUANT: CPT

## 2024-11-16 PROCEDURE — 2500000002 HC RX 250 W HCPCS SELF ADMINISTERED DRUGS (ALT 637 FOR MEDICARE OP, ALT 636 FOR OP/ED): Performed by: STUDENT IN AN ORGANIZED HEALTH CARE EDUCATION/TRAINING PROGRAM

## 2024-11-16 ASSESSMENT — COLUMBIA-SUICIDE SEVERITY RATING SCALE - C-SSRS
2. HAVE YOU ACTUALLY HAD ANY THOUGHTS OF KILLING YOURSELF?: NO
6. HAVE YOU EVER DONE ANYTHING, STARTED TO DO ANYTHING, OR PREPARED TO DO ANYTHING TO END YOUR LIFE?: NO
1. SINCE LAST CONTACT, HAVE YOU WISHED YOU WERE DEAD OR WISHED YOU COULD GO TO SLEEP AND NOT WAKE UP?: NO

## 2024-11-16 ASSESSMENT — PAIN SCALES - GENERAL: PAINLEVEL_OUTOF10: 0 - NO PAIN

## 2024-11-16 NOTE — CARE PLAN
"The patient's goals for the shift include  get some more sleep    The clinical goals for the shift include Pt will be free from acute psychosis and irritation throughout shift    Throughout shift Pt remained calm but distrustful of staff, particularly when providing medications. For most medications, when reviewed with Pt, Pt would state, \"too dangerous\" and refused medications.  Made aware, no new orders. Diet changed from regular to carb controlled due to insulin noncompliance. Bed locked and low, call light within reach, video monitoring ongoing.    Problem: Fall/Injury  Goal: Verbalize understanding of personal risk factors for fall in the hospital  Outcome: Progressing  Goal: Verbalize understanding of risk factor reduction measures to prevent injury from fall in the home  Outcome: Progressing  Goal: Use assistive devices by end of the shift  Outcome: Progressing  Goal: Pace activities to prevent fatigue by end of the shift  Outcome: Progressing     Problem: Skin  Goal: Decreased wound size/increased tissue granulation at next dressing change  Outcome: Progressing  Goal: Participates in plan/prevention/treatment measures  Outcome: Progressing  Goal: Prevent/manage excess moisture  Outcome: Progressing  Goal: Prevent/minimize sheer/friction injuries  Outcome: Progressing  Goal: Promote/optimize nutrition  Outcome: Progressing  Goal: Promote skin healing  Outcome: Progressing       "

## 2024-11-16 NOTE — CARE PLAN
"    The clinical goals for the shift include Pt will be compliant with care throughout the shift      Pt was mostly uncooperative throughout the shift, appeared suspicious and paranoid of others; however, he did not have any behavioral outbursts. Pt stated blood sugar checks were \"too dangerous.\"   "

## 2024-11-16 NOTE — ASSESSMENT & PLAN NOTE
-Catatonia now resolved  -Schizophrenia    59M history of schizophreniaand diabetes, who returned to ED from inpatient psych for medical exam due to worsening mentation, refusal to take PO, and unstable vital signs. Psychiatry was consulted on 11/6 for psychiatric evaluation. His outpatient medication regimen appeared to be benztropine 0.5 mg BID, carbamazepine 400 mg nightly, clozapine 500 mg nightly, propranolol 10 mg TID.      11/15 : Patient received haldol decanoate yesterday at dose of 300mg (was due 200mg on 11/23 per home regimen) w/out incident. Today, pt w/out Sx of catatonia - interacting appropriately and engaging in care, although refusing PO meds which seems to be a chronic behavior for pt. No concerns for side effects from increased dose of Haldol Decanoate. No acute safety concerns. Considered whether pt requires/might benefit from inpt psychiatric admission, but given that he lives in an environment with 24/7 supervision inpt psychiatric admission is NOT the least restrictive environment for this pt and he is unlikely to benefit from further medication changes until we can observe the effects of the increased dose of Haldol Decanoate. Therefore, pt does not require inpt psychiatric admission at this time.    11/16:  Dr. Odell:  Mr. Parra was odd but approachable today.  Nurses indicated he has refused glucose checks but has otherwise cooperated with care.  There have been times when he has not wanted to take medications.   I agree with Dr. Evans that inpatient psychiatric hospitalization is not the least restrictive setting for Mr. Parra but given he lives in a 24/7 supervised setting, inpatient psychiatric hospitalization is not currently recommended.        Impression:  Catatonia - resolved  Acute exacerbation of chronic schizophrenia - resolving     Recommendations:  Safety/Monitoring:  Patient does NOT meet criteria for inpatient psychiatric admission - pt clear from psych  perspective to return to LTC when medically ready  Patient does not require 1:1 observation from a psychiatric perspective, defer to primary team  Daily interdisciplinary safety and planning huddle with Psychiatry  Video monitoring as with all patients on the MPU  Psychiatry will follow patient daily while on the MPU     Medications:  - Continue haldol decanoate 300mg IM every 28 days for schizophrenia, next due (12/12/2024)  - Reasonable to discontinue Propranolol 10 TID from psych perspective as pt not taking.  - Continue Ativan 1mg IV (vs PO/IM if no IV access) q8H PRN agitation     Therapies recommended:  art therapy, music therapy, pet therapy, recreational therapy, and will continue to assess for benefit and meaningful participation     Delirium Guidelines  Provide glasses, hearing aids and/or communication boards as needed for impairments  Frequent reorientation, minimize room and staff changes  Open blinds during the day, dark/quiet room at night   Minimal interruptions and daytime naps  Early evaluation and intervention by PT, out of bed as tolerated  Minimize use of restraints   Minimize use of benzodiazepines, anticholinergic medications, and opiates (while ensuring adequate treatment of pain)  Keep Mg>2, K>4 (as able)  Ensure regular bowel and bladder function (as able)     Disposition/Discharge Planning:  SW following, appreciate assistance      Multidisciplinary Rounding  Consulting Physician, Fellow, Medical Student, Charge Nurse, Nurse, Pharmacist, Social Work, Care Coordinator, Dietician, Music Therapy, Art Therapy, Recreational Therapy, and      Medication Consent  N/A - Consult Service

## 2024-11-16 NOTE — PROGRESS NOTES
"Clayton Parra is a 59 y.o. male on day 10 of admission presenting with Altered mental status, unspecified altered mental status type.    Psychiatry consulted to help primary team with management of catatonia (now resolved) and Schizophrenia.      Subjective   I believe Mr. Parra was just standing in the dark in his bathroom when I came to his doorway.  I called his name and he scampered out of the dark room.   I believe he said he was \"fine.\"   I asked about his mood and he said he was \"conserving energy, positive.\"   He also said his mood was \"effective.\"   He had eaten \"not too much, not too little.\"  He did not endorse anxiety (\"not at all\").   I asked why he was in the restroom in the dark and he said it was \"cooler in there\" (room not noticeably hot).  He seemed to be gesturing to the HVAC vent but I did not understand what he was trying to communicate.       Per nursing, he has been refusing medication sometimes.  He has also refused glucose sticks stating yesterday it was \"too dangerous.\"    Objective     Last Recorded Vitals  Blood pressure 152/86, pulse 75, temperature 36.1 °C (97 °F), resp. rate 17, height 1.727 m (5' 8\"), weight 99.8 kg (220 lb), SpO2 99%.        Mental Status Exam  General: alert  Appearance: odd acting, green shirt and green sweatpants with holes, good eye contact  Attitude: responsive, calm   Behavior: odd  Motor Activity: movements were somewhat furtive, gesturing at vent  Speech: has an odd triston to his speech, spoke in small bursts, speech not natural sounding, stilted   Mood: \"effective\"  \"conserving energy, positive\"  Affect: flat   Thought Process: He gave goal directed but odd answers to simple questions   Thought Content: seemed to be focused on the temperature in the room  Thought Perception: he did not endorse hallucinations but possibly he was internally stimulated  Cognition: impaired  Insight: poor  Judgement: impaired       Relevant Results  Results for orders " placed or performed during the hospital encounter of 11/06/24 (from the past 24 hours)   POCT GLUCOSE   Result Value Ref Range    POCT Glucose 220 (H) 74 - 99 mg/dL   POCT GLUCOSE   Result Value Ref Range    POCT Glucose 205 (H) 74 - 99 mg/dL   POCT GLUCOSE   Result Value Ref Range    POCT Glucose 162 (H) 74 - 99 mg/dL   POCT GLUCOSE   Result Value Ref Range    POCT Glucose 240 (H) 74 - 99 mg/dL      Current Facility-Administered Medications:     aspirin EC tablet 81 mg, 81 mg, oral, Daily, Geraldine Rojas MD, 81 mg at 11/15/24 0844    atorvastatin (Lipitor) tablet 40 mg, 40 mg, oral, Nightly, Geraldine Rojas MD, 40 mg at 11/12/24 2114    dextrose 50 % injection 12.5 g, 12.5 g, intravenous, q15 min PRN, Bridget Billingsley APRN-CNP    dextrose 50 % injection 12.5 g, 12.5 g, intravenous, q15 min PRN, Bridget Billingsley APRN-CNP    dextrose 50 % injection 25 g, 25 g, intravenous, q15 min PRN, Bridget Billingsley APRN-CNP    dextrose 50 % injection 25 g, 25 g, intravenous, q15 min PRN, Bridget Billingsley APRN-CNP    docusate sodium (Colace) capsule 100 mg, 100 mg, oral, Daily, Rick Cadet MD    enoxaparin (Lovenox) syringe 40 mg, 40 mg, subcutaneous, q24h, CECILIA Ramon-CNP, 40 mg at 11/13/24 1212    ferrous sulfate (325 mg ferrous sulfate) tablet 325 mg, 65 mg of iron, oral, Daily with breakfast, Geraldine Rojas MD    glucagon (Glucagen) injection 1 mg, 1 mg, intramuscular, q15 min PRN, CECILIA Ramon-CNP    glucagon (Glucagen) injection 1 mg, 1 mg, intramuscular, q15 min PRN, CECILIA Ramon-CNP    glucagon (Glucagen) injection 1 mg, 1 mg, intramuscular, q15 min PRN, CECILIA Ramon-CNP    glucagon (Glucagen) injection 1 mg, 1 mg, intramuscular, q15 min PRN, Bridget Billingsley, APRN-CNP    haloperidol decanoate (Haldol Decanoate) injection 300 mg, 300 mg, intramuscular, q28 days, Rick Cadet MD, 300 mg at 11/14/24 2137    insulin glargine (Lantus) injection 20 Units, 20  Units, subcutaneous, Nightly, Rick Cadet MD    insulin lispro injection 0-10 Units, 0-10 Units, subcutaneous, q6h, Geraldine Rojas MD, 2 Units at 11/16/24 0003    insulin lispro injection 18 Units, 18 Units, subcutaneous, TID AC, Geraldine Rojas MD, 18 Units at 11/15/24 1640    lisinopril tablet 20 mg, 20 mg, oral, Daily, Geraldine Rojas MD, 20 mg at 11/16/24 0906    LORazepam (Ativan) injection 1 mg, 1 mg, intravenous, q8h PRN, Geraldine Rojas MD    [Held by provider] metFORMIN (Glucophage) tablet 1,000 mg, 1,000 mg, oral, BID, Geraldine Rojas MD    metoprolol tartrate (Lopressor) injection 5 mg, 5 mg, intravenous, q6h PRN, Geraldine Rojas MD    polyethylene glycol (Glycolax, Miralax) packet 17 g, 17 g, oral, Daily, Geraldine Rojas MD, 17 g at 11/09/24 0944    propranolol (Inderal) tablet 10 mg, 10 mg, oral, TID, Geraldine Rojas MD, 10 mg at 11/16/24 0906    thiamine (Vitamin B-1) tablet 100 mg, 100 mg, oral, Daily, Geraldine Rojas MD, 100 mg at 11/06/24 1415       Assessment/Plan   Assessment & Plan  Altered mental status, unspecified altered mental status type  -Catatonia now resolved  -Schizophrenia    59M history of schizophreniaand diabetes, who returned to ED from inpatient psych for medical exam due to worsening mentation, refusal to take PO, and unstable vital signs. Psychiatry was consulted on 11/6 for psychiatric evaluation. His outpatient medication regimen appeared to be benztropine 0.5 mg BID, carbamazepine 400 mg nightly, clozapine 500 mg nightly, propranolol 10 mg TID.      11/15 : Patient received haldol decanoate yesterday at dose of 300mg (was due 200mg on 11/23 per home regimen) w/out incident. Today, pt w/out Sx of catatonia - interacting appropriately and engaging in care, although refusing PO meds which seems to be a chronic behavior for pt. No concerns for side effects from increased dose of Haldol Decanoate. No acute safety  concerns. Considered whether pt requires/might benefit from inpt psychiatric admission, but given that he lives in an environment with 24/7 supervision inpt psychiatric admission is NOT the least restrictive environment for this pt and he is unlikely to benefit from further medication changes until we can observe the effects of the increased dose of Haldol Decanoate. Therefore, pt does not require inpt psychiatric admission at this time.    11/16:  Dr. Odell:  Mr. Parra was odd but approachable today.  Nurses indicated he has refused glucose checks but has otherwise cooperated with care.  There have been times when he has not wanted to take medications.   I agree with Dr. Evans that inpatient psychiatric hospitalization is not the least restrictive setting for Mr. Parra but given he lives in a 24/7 supervised setting, inpatient psychiatric hospitalization is not currently recommended.        Impression:  Catatonia - resolved  Acute exacerbation of chronic schizophrenia - resolving     Recommendations:  Safety/Monitoring:  Patient does NOT meet criteria for inpatient psychiatric admission - pt clear from psych perspective to return to LTC when medically ready  Patient does not require 1:1 observation from a psychiatric perspective, defer to primary team  Daily interdisciplinary safety and planning huddle with Psychiatry  Video monitoring as with all patients on the MPU  Psychiatry will follow patient daily while on the MPU     Medications:  - Continue haldol decanoate 300mg IM every 28 days for schizophrenia, next due (12/12/2024)  - Reasonable to discontinue Propranolol 10 TID from psych perspective as pt not taking.  - Continue Ativan 1mg IV (vs PO/IM if no IV access) q8H PRN agitation     Therapies recommended:  art therapy, music therapy, pet therapy, recreational therapy, and will continue to assess for benefit and meaningful participation     Delirium Guidelines  Provide glasses, hearing aids  and/or communication boards as needed for impairments  Frequent reorientation, minimize room and staff changes  Open blinds during the day, dark/quiet room at night   Minimal interruptions and daytime naps  Early evaluation and intervention by PT, out of bed as tolerated  Minimize use of restraints   Minimize use of benzodiazepines, anticholinergic medications, and opiates (while ensuring adequate treatment of pain)  Keep Mg>2, K>4 (as able)  Ensure regular bowel and bladder function (as able)     Disposition/Discharge Planning:  SW following, appreciate assistance      Multidisciplinary Rounding  Consulting Physician, Fellow, Medical Student, Charge Nurse, Nurse, Pharmacist, Social Work, Care Coordinator, Dietician, Music Therapy, Art Therapy, Recreational Therapy, and      Medication Consent  N/A - Consult Service             Sarah Odell MD

## 2024-11-16 NOTE — PROGRESS NOTES
Clayton Parra is a 59 y.o. y.o. male on day 10 of admission presenting with Altered mental status, unspecified altered mental status type [R41.82].     Subjective   Patient remains medically ready for discharge and no longer meets criteria for inpatient psychiatry. Patient is a bed hold at [facility] but patients sister/legal guardian has concerns regarding patients care at facility. Patients sister was scheduled to onsite [facility] this morning; attempted to follow up with her to determine if this is her FOC but had to LM requesting return call. Patients sister had also visited [facility] earlier this week.    If patients sister is not agreeable to [facility] or [facility], patient will need to return to [facility] as there are no other accepting facilities.    Dr. Cadet updated.    UPDATE [id number] Received phone call from patients sister/legal guardian Mehnaz; she reports that she completed onsite at [facility] today and would like to proceed with this facility.    Update provided to [facility] that they are FOC and updated clinicals sent; advised that they will submit to [facility] Monday AM for LTC auth.    Dr. Cadet updated.    - Halina QUIJANO MA, LSW  Care Transitions   Epic Secure Chat or [phone]

## 2024-11-17 VITALS
SYSTOLIC BLOOD PRESSURE: 151 MMHG | BODY MASS INDEX: 33.34 KG/M2 | OXYGEN SATURATION: 100 % | HEIGHT: 68 IN | TEMPERATURE: 97.9 F | WEIGHT: 220 LBS | DIASTOLIC BLOOD PRESSURE: 79 MMHG | HEART RATE: 74 BPM | RESPIRATION RATE: 17 BRPM

## 2024-11-17 LAB
GLUCOSE BLD MANUAL STRIP-MCNC: 225 MG/DL (ref 74–99)
GLUCOSE BLD MANUAL STRIP-MCNC: 234 MG/DL (ref 74–99)
GLUCOSE BLD MANUAL STRIP-MCNC: 241 MG/DL (ref 74–99)

## 2024-11-17 PROCEDURE — 99231 SBSQ HOSP IP/OBS SF/LOW 25: CPT | Performed by: PSYCHIATRY & NEUROLOGY

## 2024-11-17 PROCEDURE — 2500000001 HC RX 250 WO HCPCS SELF ADMINISTERED DRUGS (ALT 637 FOR MEDICARE OP): Performed by: STUDENT IN AN ORGANIZED HEALTH CARE EDUCATION/TRAINING PROGRAM

## 2024-11-17 PROCEDURE — 82947 ASSAY GLUCOSE BLOOD QUANT: CPT

## 2024-11-17 PROCEDURE — 1100000001 HC PRIVATE ROOM DAILY

## 2024-11-17 PROCEDURE — 99231 SBSQ HOSP IP/OBS SF/LOW 25: CPT | Performed by: STUDENT IN AN ORGANIZED HEALTH CARE EDUCATION/TRAINING PROGRAM

## 2024-11-17 RX ORDER — INSULIN LISPRO 100 [IU]/ML
0-10 INJECTION, SOLUTION INTRAVENOUS; SUBCUTANEOUS EVERY 6 HOURS
Start: 2024-11-17

## 2024-11-17 RX ORDER — TALC
3 POWDER (GRAM) TOPICAL NIGHTLY PRN
Start: 2024-11-17

## 2024-11-17 RX ORDER — INSULIN LISPRO 100 [IU]/ML
18 INJECTION, SOLUTION INTRAVENOUS; SUBCUTANEOUS
Start: 2024-11-17

## 2024-11-17 RX ORDER — HALOPERIDOL DECANOATE 100 MG/ML
300 INJECTION INTRAMUSCULAR
Start: 2024-12-12

## 2024-11-17 RX ORDER — INSULIN GLARGINE 100 [IU]/ML
20 INJECTION, SOLUTION SUBCUTANEOUS NIGHTLY
Start: 2024-11-17

## 2024-11-17 ASSESSMENT — PAIN SCALES - WONG BAKER: WONGBAKER_NUMERICALRESPONSE: NO HURT

## 2024-11-17 ASSESSMENT — PAIN SCALES - GENERAL
PAINLEVEL_OUTOF10: 0 - NO PAIN
PAINLEVEL_OUTOF10: 0 - NO PAIN

## 2024-11-17 NOTE — PROGRESS NOTES
.                                                                                                                                                                                                                                                                                             INTERNAL MEDICINE PROGRESS NOTE     BRIEF NARRATIVE      Clayton Parra is a 59 y.o. male on day 10 of admission presenting with Altered mental status, unspecified altered mental status type.    Pt with significant psychiatric history including schizophrenia/schizoaffective/bipolar with apparent home medications ( haldol IV 100mg monthly, carbamazepine 400 mg qhs, clozapine 500 mg qhs,  benztropine 0.5 mg BID, propanolol 10mg TID), T2DM  HLD, anemia, HTN, who originally presented to the ED a couple days ago from [facility] for refusing his psych medications.  Was deemed to not have capacity and sent to [facility] for psych admission.  Upon arrival at [facility] patient was found to be alert but nonverbal and not oriented to self place or time.  On arrival to the ED he was found to be diaphoretic rigid.  At the time of this exam he was again diaphoretic and rigid nonverbal his eyes were open but he did not respond to questions and he responded very lightly to noxious stimuli.  While in the ED his vitals were stable.  They attempted an LP unsuccessfully.  His labs were WNL.  CT C/A/P and CT of the head were negative for any acute issues CXR was also negative.  Patient was admitted to MPU for  encephalopathy (resolved).      11/16  No events      11/15  -Per psych, no need for inpt psych admission  -Keep with Haldol Dec 300mg q28 days monotherapy.   -SW to follow up with the sister Laure Andres, likely discharge by Monday.        11/14  -Given 300 mg of haldol decanoate (DE [place] CRUZ) 11/14 and then will be continuing that every 28 days.    -Clozapine can be stopped completely per psych recs. Scheduled ativan can  stopped  -Reduce the bowel regimen.  -Dispo: IS. Requested EKG.  Medically clear.         ASSESSMENT / PLANS      #Catatonia - resolved    #Acute encephalopathy, multifactorial, psychosis v toxic v infectious - resolved   #H/o Schizophrenia   #Xanthochromic CSF without evidence of SAH   Etiology of a catatonia is most likely secondary to antipsychotic. However differential includes possible NMS v serotonin syndrome (tachycardia, hypertension, rigidity, mixed clonus and hyperreflexia), meningitis/ encephalitis less likely (negative white count, bcx neg, nuchal rigidity noted but  Kernig and Brudzinski signs negative).   CT head on 11/6 negative for intracranial hemorrhage   - S/p Acyclovir, ampicillin, ceftriaxone and vancomycin in the ED for empiric meningitis coverage. Bcx, WBC negative, afebrile, procal neg. No infectious source identified so far, Will not initiate abx at the moment.    - LP 11/8 : Neutrophils 42, protein 83, glucose 151, RBC 3000.   - Psych following   - Pt currently meet inpatient psych admission   - Patient is medically clear for impatient psych admission    - Cont fall precaution   - 1:1 sitter  as needed   - Discussed with patient sister (the guardian) on 11/8 and updated. At baseline patient does ambulate. Sister requested placement to a different facility   - PT/OT      > T2DM  - Hold home metformin  - Home insulin regimen: Lantus 70 units nightly, Humalog 18 units 3 times daily with meals  - BG fluctuating between 127- 262. For now will continue Lantus 10u nightly , Lispro 18u TID with meals and #1 SSI. Goal -180   - Cont accucheck      > HTN  > HLD  -Will hold home aspirin and atorvastatin and lisinopril for now until patient is able to take p.o.  - Cont IV lopressor 5mg for SBP > 160  DBP > 110      > Anemia  -Hgb stable  -Will hold home iron     Fluids: monitor and replete as needed  Electrolytes: monitor and replete as needed  Nutrition: Regular diet   GI prophylaxis: as  "needed  DVT prophylaxis: SCD  Code Status: full code    SUBJECTIVE       Patient seen and examined          40 min     OBJECTIVE      Visit Vitals  /78   Pulse 79   Temp 37 °C (98.6 °F)   Resp 17      No intake or output data in the 24 hours ending 11/16/24 2248    Physical Exam  Constitutional:       Appearance: He is ill-appearing and toxic-appearing.   HENT:      Head: Normocephalic and atraumatic.      Right Ear: Tympanic membrane normal.      Left Ear: Tympanic membrane normal.   Eyes:      Pupils: Pupils are equal, round, and reactive to light.   Cardiovascular:      Rate and Rhythm: Tachycardia present.   Abdominal:      Palpations: Abdomen is soft.   Musculoskeletal:         General: Normal range of motion.      Cervical back: Rigidity present.   Skin:     Capillary Refill: Capillary refill takes less than 2 seconds.   Neurological:      Mental Status: He is disoriented.        Current Meds   aspirin, 81 mg, oral, Daily  atorvastatin, 40 mg, oral, Nightly  docusate sodium, 100 mg, oral, Daily  enoxaparin, 40 mg, subcutaneous, q24h  ferrous sulfate (325 mg ferrous sulfate), 65 mg of iron, oral, Daily with breakfast  haloperidol decanoate, 300 mg, intramuscular, q28 days  insulin glargine, 20 Units, subcutaneous, Nightly  insulin lispro, 0-10 Units, subcutaneous, q6h  insulin lispro, 18 Units, subcutaneous, TID AC  lisinopril, 20 mg, oral, Daily  [Held by provider] metFORMIN, 1,000 mg, oral, BID  polyethylene glycol, 17 g, oral, Daily  propranolol, 10 mg, oral, TID  thiamine, 100 mg, oral, Daily       PRN medications: dextrose, dextrose, dextrose, dextrose, glucagon, glucagon, glucagon, glucagon, LORazepam, metoprolol     LABS and IMAGING     No results found for: \"WBC\", \"HGB\", \"HCT\", \"GLU\", \"CO2\", \"CREATININE\", \"CALCIUM\", \"INR\", \"PTT\", \"TROPONINI\"      FL guided lumbar puncture  Narrative: Interpreted By:  Maurisio Rodriguez and Stevens Alex   STUDY:  FL GUIDED LUMBAR PUNCTURE;  11/8/2024 2:47 pm    "   INDICATION:  Signs/Symptoms:lumbar puncture-- several attempts in the ED.          COMPARISON:  None.      ACCESSION NUMBER(S):  [id number]      ORDERING CLINICIAN:  LUIS CORTÉS      TECHNIQUE:  After discussion of risks, benefits, and alternatives, oral and  written informed consent was obtained. The patient was placed in  prone position on exam table. The L3-4 interspace was then marked  under fluoroscopy. The patient was then prepped and draped in sterile  fashion. Local anesthesia was achieved with 2% Lidocaine solution. A  20 gauge spinal needle was then introduced at the L3-L4 level under  direct fluoroscopic guidance until return of CSF was demonstrated. 10  ml of clear CSF was collected in 4 tubes. The stylet was then  replaced and the spinal needle was removed.  Hemostasis was achieved  with direct pressure and the area was dressed with a Band-Aid. The  patient tolerated procedure well without any immediate or clinically  apparent complications. Total fluoroscopy time was 0.5 MINUTES.      The collected CSF was then sent to the lab for appropriate lab tests  as ordered by the referring physician.      FINDINGS:  A single periprocedural spot fluoroscopic image was provided for  interpretation. Image quality is such that fine bony detail is  obscured. A needle is seen to overlie the posterior elements of L4.      Impression: Successful fluoroscopic guided lumbar puncture.      I personally reviewed the images/study and I agree with the findings  as stated by Endy Boss DO PGY-2. This study was interpreted at  [facility], [place], [place].      MACRO:  None      Signed by: Maurisio Rodriguez 11/11/2024 2:34 PM  Dictation workstation:   [id number]         PROBLEM LISTS      Problem List Items Addressed This Visit       * (Principal) Altered mental status, unspecified altered mental status type - Primary       This dictation was created with voice recognition software.  Although every effort is made to review the dictation as it is transcribed, on occasion spoken words, phrases, names, numbers, punctuation etc can be misinterpreted by the the technology leading to omissions or inappropriate outcomes.     Rick Cadet MD

## 2024-11-17 NOTE — PROGRESS NOTES
.                                                                                                                                                                                                                                                                                             INTERNAL MEDICINE PROGRESS NOTE     BRIEF NARRATIVE      Clayton Parra is a 59 y.o. male on day 11 of admission presenting with Altered mental status, unspecified altered mental status type.    Pt with significant psychiatric history including schizophrenia/schizoaffective/bipolar with apparent home medications ( haldol IV 100mg monthly, carbamazepine 400 mg qhs, clozapine 500 mg qhs,  benztropine 0.5 mg BID, propanolol 10mg TID), T2DM  HLD, anemia, HTN, who originally presented to the ED a couple days ago from [facility] for refusing his psych medications.  Was deemed to not have capacity and sent to [facility] for psych admission.  Upon arrival at [facility] patient was found to be alert but nonverbal and not oriented to self place or time.  On arrival to the ED he was found to be diaphoretic rigid.  At the time of this exam he was again diaphoretic and rigid nonverbal his eyes were open but he did not respond to questions and he responded very lightly to noxious stimuli.  While in the ED his vitals were stable.  They attempted an LP unsuccessfully.  His labs were WNL.  CT C/A/P and CT of the head were negative for any acute issues CXR was also negative.  Patient was admitted to [facility] for  encephalopathy (resolved).       No events       -Per psych, no need for inpt psych admission  -Keep with Haldol Dec 300mg q28 days monotherapy.   -SW to follow up with the sister Laure Andres, likely discharge by Monday.        11/14  -Given 300 mg of haldol decanoate (DE LA CRUZ) 11/14 and then will be continuing that every 28 days.    -Clozapine can be stopped completely per psych recs. Scheduled ativan can stopped  -Reduce the  bowel regimen.  -Dispo: IS. Requested EKG.  Medically clear.         ASSESSMENT / PLANS      #Catatonia - resolved    #Acute encephalopathy, multifactorial, psychosis v toxic v infectious - resolved   #H/o Schizophrenia   #Xanthochromic CSF without evidence of SAH   Etiology of a catatonia is most likely secondary to antipsychotic. However differential includes possible NMS v serotonin syndrome (tachycardia, hypertension, rigidity, mixed clonus and hyperreflexia), meningitis/ encephalitis less likely (negative white count, bcx neg, nuchal rigidity noted but  Kernig and Brudzinski signs negative).   CT head on 11/6 negative for intracranial hemorrhage   - S/p Acyclovir, ampicillin, ceftriaxone and vancomycin in the ED for empiric meningitis coverage. Bcx, WBC negative, afebrile, procal neg. No infectious source identified so far, Will not initiate abx at the moment.    - LP 11/8 : Neutrophils 42, protein 83, glucose 151, RBC 3000.   - Psych following   - Pt currently meet inpatient psych admission   - Patient is medically clear for impatient psych admission    - Cont fall precaution   - 1:1 sitter  as needed   - Discussed with patient sister (the guardian) on 11/8 and updated. At baseline patient does ambulate. Sister requested placement to a different facility   - PT/OT      > T2DM  - Hold home metformin  - Home insulin regimen: Lantus 70 units nightly, Humalog 18 units 3 times daily with meals  - BG fluctuating between 127- 262. For now will continue Lantus 10u nightly , Lispro 18u TID with meals and #1 SSI. Goal -180   - Cont accucheck      > HTN  > HLD  -Will hold home aspirin and atorvastatin and lisinopril for now until patient is able to take p.o.  - Cont IV lopressor 5mg for SBP > 160  DBP > 110      > Anemia  -Hgb stable  -Will hold home iron     Fluids: monitor and replete as needed  Electrolytes: monitor and replete as needed  Nutrition: Regular diet   GI prophylaxis: as needed  DVT prophylaxis:  "SCD  Code Status: full code    SUBJECTIVE       Patient seen and examined          40 min     OBJECTIVE      Visit Vitals  /79   Pulse 74   Temp 36.6 °C (97.9 °F)   Resp 17      No intake or output data in the 24 hours ending 11/17/24 1813    Physical Exam  Constitutional:       Appearance: He is ill-appearing and toxic-appearing.   HENT:      Head: Normocephalic and atraumatic.      Right Ear: Tympanic membrane normal.      Left Ear: Tympanic membrane normal.   Eyes:      Pupils: Pupils are equal, round, and reactive to light.   Cardiovascular:      Rate and Rhythm: Tachycardia present.   Abdominal:      Palpations: Abdomen is soft.   Musculoskeletal:         General: Normal range of motion.      Cervical back: Rigidity present.   Skin:     Capillary Refill: Capillary refill takes less than 2 seconds.   Neurological:      Mental Status: He is disoriented.        Current Meds   aspirin, 81 mg, oral, Daily  atorvastatin, 40 mg, oral, Nightly  docusate sodium, 100 mg, oral, Daily  enoxaparin, 40 mg, subcutaneous, q24h  ferrous sulfate (325 mg ferrous sulfate), 65 mg of iron, oral, Daily with breakfast  haloperidol decanoate, 300 mg, intramuscular, q28 days  insulin glargine, 20 Units, subcutaneous, Nightly  insulin lispro, 0-10 Units, subcutaneous, q6h  insulin lispro, 18 Units, subcutaneous, TID AC  lisinopril, 20 mg, oral, Daily  [Held by provider] metFORMIN, 1,000 mg, oral, BID  polyethylene glycol, 17 g, oral, Daily  propranolol, 10 mg, oral, TID  thiamine, 100 mg, oral, Daily       PRN medications: dextrose, dextrose, dextrose, dextrose, glucagon, glucagon, glucagon, glucagon, LORazepam, metoprolol     LABS and IMAGING     No results found for: \"WBC\", \"HGB\", \"HCT\", \"GLU\", \"CO2\", \"CREATININE\", \"CALCIUM\", \"INR\", \"PTT\", \"TROPONINI\"      FL guided lumbar puncture  Narrative: Interpreted By:  Maurisio Rodriguez and Stevens Alex   STUDY:  FL GUIDED LUMBAR PUNCTURE;  11/8/2024 2:47 pm    "   INDICATION:  Signs/Symptoms:lumbar puncture-- several attempts in the ED.          COMPARISON:  None.      ACCESSION NUMBER(S):  [id number]      ORDERING CLINICIAN:  LUIS CORTÉS      TECHNIQUE:  After discussion of risks, benefits, and alternatives, oral and  written informed consent was obtained. The patient was placed in  prone position on exam table. The L3-4 interspace was then marked  under fluoroscopy. The patient was then prepped and draped in sterile  fashion. Local anesthesia was achieved with 2% Lidocaine solution. A  20 gauge spinal needle was then introduced at the L3-L4 level under  direct fluoroscopic guidance until return of CSF was demonstrated. 10  ml of clear CSF was collected in 4 tubes. The stylet was then  replaced and the spinal needle was removed.  Hemostasis was achieved  with direct pressure and the area was dressed with a Band-Aid. The  patient tolerated procedure well without any immediate or clinically  apparent complications. Total fluoroscopy time was 0.5 MINUTES.      The collected CSF was then sent to the lab for appropriate lab tests  as ordered by the referring physician.      FINDINGS:  A single periprocedural spot fluoroscopic image was provided for  interpretation. Image quality is such that fine bony detail is  obscured. A needle is seen to overlie the posterior elements of L4.      Impression: Successful fluoroscopic guided lumbar puncture.      I personally reviewed the images/study and I agree with the findings  as stated by Endy Boss DO PGY-2. This study was interpreted at  [facility], [place], [place].      MACRO:  None      Signed by: Maurisio Rodriguez 11/11/2024 2:34 PM  Dictation workstation:   [id number]         PROBLEM LISTS      Problem List Items Addressed This Visit       * (Principal) Altered mental status, unspecified altered mental status type - Primary    Relevant Medications    haloperidol decanoate (Haldol  Decanoate) 100 mg/mL injection (Start on 12/12/2024)    insulin lispro 100 unit/mL injection    insulin lispro 100 unit/mL injection    insulin glargine (Lantus) 100 unit/mL injection    melatonin 3 mg tablet    Other Relevant Orders    Referral to Primary Care - Family Practice       This dictation was created with voice recognition software. Although every effort is made to review the dictation as it is transcribed, on occasion spoken words, phrases, names, numbers, punctuation etc can be misinterpreted by the the technology leading to omissions or inappropriate outcomes.     Rick Cadet MD

## 2024-11-17 NOTE — PROGRESS NOTES
"Clayton Parra is a 59 y.o. male on day 11 of admission presenting with Altered mental status, unspecified altered mental status type.     Psychiatry consulted to help primary team with management of catatonia (now resolved) and Schizophrenia.          Subjective  Mr. Parra was sitting in the chair next to the door of his room with a vacant look on his face.   He answered some questions but his volume was very low and he spoke quickly so it was hard to make out what he said most of the time.   I identified myself as a psychiatrist and he seemed to question what a psychiatrist does but I could not be certain I heard correctly.   I got the sense that he was speaking more to himself than to me.   I asked his mood and he said he did not want to talk.   When I questioned why, he responded \"maintain a quiet reserve.\"   I think he said something about being on medicine for diabetes.  He said more things as I tried to coax him into speaking a bit but I did not understand him.      Per the report of his night shift nurse,  he was uncooperative with assessments, medications and nursing care. She had a similar experience to the one I had yesterday morning where Mr. Parra was in the bathroom in the dark.   His day shift nurse indicated he has allowed glucose checks.        Objective  Visit Vitals  /71   Pulse 67   Temp 36.6 °C (97.9 °F)   Resp 17   Ht 1.727 m (5' 8\")   Wt 99.8 kg (220 lb)   SpO2 98%   BMI 33.45 kg/m²   Smoking Status Every Day   BSA 2.19 m²        Mental Status Exam  General: alert  Appearance: odd acting, wearing same green shirt and green sweatpants with holes, limited eye contact  Attitude: not agitated but reserved, did not want to engage   Behavior: odd  Motor Activity: right arm tremor   Speech: spoke in quiet, rapid bursts that I could not hear well, what I could hear was stilted   Mood: he did not report a mood, said he wanted to \"maintain a quiet reserve\"  Affect: flat   Thought " Process: Unable to assess due to limited engagement and difficulty hearing him  Thought Content: Did not disclose thoughts to this writer, did not want to talk   Thought Perception: possible internal stimulation   Cognition: impaired  Insight: poor  Judgement: impaired         Relevant Results     Results for orders placed or performed during the hospital encounter of 11/06/24 (from the past 24 hours)   POCT GLUCOSE   Result Value Ref Range    POCT Glucose 240 (H) 74 - 99 mg/dL   POCT GLUCOSE   Result Value Ref Range    POCT Glucose 234 (H) 74 - 99 mg/dL   POCT GLUCOSE   Result Value Ref Range    POCT Glucose 241 (H) 74 - 99 mg/dL         Current Facility-Administered Medications:     aspirin EC tablet 81 mg, 81 mg, oral, Daily, Geraldine Rojas MD, 81 mg at 11/17/24 0839    atorvastatin (Lipitor) tablet 40 mg, 40 mg, oral, Nightly, Geraldine Rojas MD, 40 mg at 11/12/24 2114    dextrose 50 % injection 12.5 g, 12.5 g, intravenous, q15 min PRN, Bridget Billingsley, APRN-CNP    dextrose 50 % injection 12.5 g, 12.5 g, intravenous, q15 min PRN, Bridget Billingsley, APRN-CNP    dextrose 50 % injection 25 g, 25 g, intravenous, q15 min PRN, Bridget Billingsley, APRN-CNP    dextrose 50 % injection 25 g, 25 g, intravenous, q15 min PRN, Bridget Billingsley, APRN-CNP    docusate sodium (Colace) capsule 100 mg, 100 mg, oral, Daily, Rick Cadet MD    enoxaparin (Lovenox) syringe 40 mg, 40 mg, subcutaneous, q24h, Bridget Billingsley APRN-CNP, 40 mg at 11/13/24 1212    ferrous sulfate (325 mg ferrous sulfate) tablet 325 mg, 65 mg of iron, oral, Daily with breakfast, Geraldine Rojas MD    glucagon (Glucagen) injection 1 mg, 1 mg, intramuscular, q15 min PRN, CECILIA Ramon-CNP    glucagon (Glucagen) injection 1 mg, 1 mg, intramuscular, q15 min PRN, CECILIA Ramon-CNP    glucagon (Glucagen) injection 1 mg, 1 mg, intramuscular, q15 min PRN, Bridget Billingsley, APRN-CNP    glucagon (Glucagen) injection 1 mg, 1 mg,  intramuscular, q15 min PRN, Bridget Billingsley, APRN-CNP    haloperidol decanoate (Haldol Decanoate) injection 300 mg, 300 mg, intramuscular, q28 days, Rick Cadet MD, 300 mg at 11/14/24 2137    insulin glargine (Lantus) injection 20 Units, 20 Units, subcutaneous, Nightly, Rick Cadet MD    insulin lispro injection 0-10 Units, 0-10 Units, subcutaneous, q6h, Geraldine Rojas MD, 2 Units at 11/16/24 0003    insulin lispro injection 18 Units, 18 Units, subcutaneous, TID AC, Geraldine Rojas MD, 18 Units at 11/15/24 1640    lisinopril tablet 20 mg, 20 mg, oral, Daily, Geraldine Rojas MD, 20 mg at 11/17/24 0839    LORazepam (Ativan) injection 1 mg, 1 mg, intravenous, q8h PRN, Geraldine Rojas MD    [Held by provider] metFORMIN (Glucophage) tablet 1,000 mg, 1,000 mg, oral, BID, Geraldine Rojas MD    metoprolol tartrate (Lopressor) injection 5 mg, 5 mg, intravenous, q6h PRN, Geraldine Rojas MD    polyethylene glycol (Glycolax, Miralax) packet 17 g, 17 g, oral, Daily, Geraldine Rojas MD, 17 g at 11/09/24 0944    propranolol (Inderal) tablet 10 mg, 10 mg, oral, TID, Geraldine Rojas MD, 10 mg at 11/16/24 0906    thiamine (Vitamin B-1) tablet 100 mg, 100 mg, oral, Daily, Geraldine Rojas MD, 100 mg at 11/06/24 1415         Assessment/Plan     Assessment & Plan  Altered mental status, unspecified altered mental status type  -Catatonia now resolved  -Schizophrenia     59M history of schizophrenia and diabetes, who returned to ED from inpatient psych for medical exam due to worsening mentation, refusal to take PO, and unstable vital signs. Psychiatry was consulted on 11/6 for psychiatric evaluation. His outpatient medication regimen appeared to be benztropine 0.5 mg BID, carbamazepine 400 mg nightly, clozapine 500 mg nightly, propranolol 10 mg TID.      11/15 : Patient received haldol decanoate yesterday at dose of 300mg (was due 200mg on 11/23 per home regimen)  w/out incident. Today, pt w/out Sx of catatonia - interacting appropriately and engaging in care, although refusing PO meds which seems to be a chronic behavior for pt. No concerns for side effects from increased dose of Haldol Decanoate. No acute safety concerns. Considered whether pt requires/might benefit from inpt psychiatric admission, but given that he lives in an environment with 24/7 supervision inpt psychiatric admission is NOT the least restrictive environment for this pt and he is unlikely to benefit from further medication changes until we can observe the effects of the increased dose of Haldol Decanoate. Therefore, pt does not require inpt psychiatric admission at this time.     11/16  Dr. Odell:  Mr. Parra was odd but approachable today.  Nurses indicated he has refused glucose checks but has otherwise cooperated with care.  There have been times when he has not wanted to take medications.   I agree with Dr. Evans that inpatient psychiatric hospitalization is not the least restrictive setting for Mr. Parra but given he lives in a 24/7 supervised setting, inpatient psychiatric hospitalization is not currently recommended.       11/17 Dr. Odell:  Mr. Parra remains odd and reserved.  He did not want to talk today.   His night shift nurse thought he was paranoid and suspicious of others.  He may ultimately need a higher dose of Haldol DE LA CRUZ if psychotic symptoms continue to interfere with caregivers' abilities to help him.      Impression:  Catatonia - resolved  Acute exacerbation of chronic schizophrenia - resolving     Recommendations:  Safety/Monitoring:  Patient does NOT meet criteria for inpatient psychiatric admission - pt clear from psych perspective to return to LTC when medically ready  Patient does not require 1:1 observation from a psychiatric perspective, defer to primary team  Daily interdisciplinary safety and planning huddle with Psychiatry  Video monitoring as with  all patients on the MPU  Psychiatry will follow patient daily while on the MPU     Medications:  - Continue haldol decanoate 300mg IM every 28 days for schizophrenia, next due (12/12/2024)  - Reasonable to discontinue Propranolol 10 TID from psych perspective as pt not taking.  - Continue Ativan 1mg IV (vs PO/IM if no IV access) q8H PRN agitation     Therapies recommended:  art therapy, music therapy, pet therapy, recreational therapy, and will continue to assess for benefit and meaningful participation     Delirium Guidelines  Provide glasses, hearing aids and/or communication boards as needed for impairments  Frequent reorientation, minimize room and staff changes  Open blinds during the day, dark/quiet room at night   Minimal interruptions and daytime naps  Early evaluation and intervention by PT, out of bed as tolerated  Minimize use of restraints   Minimize use of benzodiazepines, anticholinergic medications, and opiates (while ensuring adequate treatment of pain)  Keep Mg>2, K>4 (as able)  Ensure regular bowel and bladder function (as able)     Disposition/Discharge Planning:  SW following, appreciate assistance.  Does not need inpatient psychiatric admission.      Multidisciplinary Rounding  Consulting Physician, Fellow, Medical Student, Charge Nurse, Nurse, Pharmacist, Social Work, Care Coordinator, Dietician, Music Therapy, Art Therapy, Recreational Therapy, and      Medication Consent  N/A - Consult Service              Sarah Odell MD

## 2024-11-17 NOTE — CARE PLAN
The patient's goals for the shift include  BILL    The clinical goals for the shift include Pt will be free from acute psychosis and irritation throughout shift    Throughout the shift Pt was calm but did not cooperate with care. Bed locked and low, call light within reach, video monitoring ongoing with  at monitor.     Problem: Fall/Injury  Goal: Verbalize understanding of personal risk factors for fall in the hospital  Outcome: Progressing  Goal: Verbalize understanding of risk factor reduction measures to prevent injury from fall in the home  Outcome: Progressing  Goal: Use assistive devices by end of the shift  Outcome: Progressing  Goal: Pace activities to prevent fatigue by end of the shift  Outcome: Progressing     Problem: Skin  Goal: Decreased wound size/increased tissue granulation at next dressing change  Outcome: Progressing  Goal: Participates in plan/prevention/treatment measures  Outcome: Progressing  Goal: Prevent/manage excess moisture  Outcome: Progressing  Goal: Prevent/minimize sheer/friction injuries  Outcome: Progressing  Goal: Promote/optimize nutrition  Outcome: Progressing  Goal: Promote skin healing  Outcome: Progressing

## 2024-11-17 NOTE — CARE PLAN
"The patient's goals for the shift include  Pt refused assessment    The clinical goals for the shift include pt will be compliant with medications and care throughout the shift.    Pt cont to be uncooperative with assessments, medications, and nursing care. Pt appears paranoid and suspicious of others, pt was in bathroom in the dark, RN approached pt to check on him, pt had intense stare and was rambling. Pt cont to state meds and blood sugar checks are \"too dangerous.\"  "

## 2024-11-18 LAB
GLUCOSE BLD MANUAL STRIP-MCNC: 183 MG/DL (ref 74–99)
GLUCOSE BLD MANUAL STRIP-MCNC: 211 MG/DL (ref 74–99)
GLUCOSE BLD MANUAL STRIP-MCNC: 229 MG/DL (ref 74–99)

## 2024-11-18 PROCEDURE — 99231 SBSQ HOSP IP/OBS SF/LOW 25: CPT | Performed by: STUDENT IN AN ORGANIZED HEALTH CARE EDUCATION/TRAINING PROGRAM

## 2024-11-18 PROCEDURE — 82947 ASSAY GLUCOSE BLOOD QUANT: CPT

## 2024-11-18 PROCEDURE — 2500000002 HC RX 250 W HCPCS SELF ADMINISTERED DRUGS (ALT 637 FOR MEDICARE OP, ALT 636 FOR OP/ED): Performed by: STUDENT IN AN ORGANIZED HEALTH CARE EDUCATION/TRAINING PROGRAM

## 2024-11-18 PROCEDURE — 99233 SBSQ HOSP IP/OBS HIGH 50: CPT | Performed by: PSYCHIATRY & NEUROLOGY

## 2024-11-18 PROCEDURE — 1100000001 HC PRIVATE ROOM DAILY

## 2024-11-18 ASSESSMENT — PAIN SCALES - GENERAL
PAINLEVEL_OUTOF10: 0 - NO PAIN
PAINLEVEL_OUTOF10: 0 - NO PAIN

## 2024-11-18 ASSESSMENT — PAIN SCALES - WONG BAKER
WONGBAKER_NUMERICALRESPONSE: NO HURT
WONGBAKER_NUMERICALRESPONSE: NO HURT

## 2024-11-18 NOTE — PROGRESS NOTES
Clayton Parra is a 59 y.o. y.o. male on day 12 of admission presenting with Altered mental status, unspecified altered mental status type [R41.82].     Subjective   Patient is medically ready for discharge and no longer meets criteria for inpatient psychiatry. Plan is for patient to discharge to [facility] and facility submitting for authorization today. Message sent to [facility] to request copy of patients PASRR.    SW will continue to follow.    UPDATE 1320 Received update from [facility] that they received PASRR from [facility] and notified that patient is requiring updated Level II by the Board of Mental Health. Patients insurance is requesting Level II Evaluation be completed prior to issuing new authorization.    Email sent to [email or web address] to follow up on the status.    SW will continue to follow.    - Halina QUIJANO, MA, LSW  Care Transitions   Epic Secure Chat or [phone]

## 2024-11-18 NOTE — CARE PLAN
The clinical goals for the shift include patient will have a restful night    Over the shift, the patient did make progress toward the following goals.       Problem: Fall/Injury  Goal: Verbalize understanding of personal risk factors for fall in the hospital  Outcome: Progressing  Goal: Verbalize understanding of risk factor reduction measures to prevent injury from fall in the home  Outcome: Progressing  Goal: Use assistive devices by end of the shift  Outcome: Progressing  Goal: Pace activities to prevent fatigue by end of the shift  Outcome: Progressing     Problem: Skin  Goal: Decreased wound size/increased tissue granulation at next dressing change  Outcome: Progressing  Goal: Participates in plan/prevention/treatment measures  Outcome: Progressing  Goal: Prevent/manage excess moisture  Outcome: Progressing  Goal: Prevent/minimize sheer/friction injuries  Outcome: Progressing  Goal: Promote/optimize nutrition  Outcome: Progressing  Goal: Promote skin healing  Outcome: Progressing

## 2024-11-18 NOTE — CARE PLAN
The clinical goals for the shift include patient will have a restful night      Problem: Fall/Injury  Goal: Verbalize understanding of risk factor reduction measures to prevent injury from fall in the home  Outcome: Progressing     Problem: Skin  Goal: Decreased wound size/increased tissue granulation at next dressing change  Outcome: Progressing

## 2024-11-18 NOTE — PROGRESS NOTES
.                                                                                                                                                                                                                                                                                             INTERNAL MEDICINE PROGRESS NOTE     BRIEF NARRATIVE         Clayton Parra is a 59 y.o. male with significant psychiatric history including schizophrenia/schizoaffective/bipolar, T2DM  HLD, anemia, HTN, who originally presented to the ED from [facility] for refusing his psych medications.  Was deemed to not have capacity and sent to [facility] for psych admission.  Upon arrival at [facility] patient was found to be alert but nonverbal and not oriented to self place or time.  On arrival to the ED he was found to be diaphoretic rigid. Psych consulted, found to be with catatonia and encephalopathy (resolved). Pt is clear for discharge at this point to SNF, he does not need inpatient psych admission. Psych team adjusted his psych meds, plan is to keep with Haldol Dec 300mg q28 days monotherapy. Dispo: [facility]. SW sent over clinicals to [facility] to start the auth.   Medically clear.         ASSESSMENT / PLANS      #Catatonia - resolved    #Acute encephalopathy, multifactorial, psychosis v toxic v infectious - resolved   #H/o Schizophrenia   #Xanthochromic CSF without evidence of SAH    #T2DM  #HTN  #HLD  #Anemia       Fluids: monitor and replete as needed  Electrolytes: monitor and replete as needed  Nutrition: Regular diet   GI prophylaxis: as needed  DVT prophylaxis: SCD  Code Status: full code    SUBJECTIVE       Patient seen and examined, no events overnight. Pt keeps refusing meds and lab.         25 min     OBJECTIVE      Visit Vitals  /77   Pulse 69   Temp 36.6 °C (97.9 °F)   Resp 17        Intake/Output Summary (Last 24 hours) at 11/18/2024 1100  Last data filed at 11/18/2024 0600  Gross per 24  "hour   Intake --   Output 0 ml   Net 0 ml       Physical Exam  Constitutional:       Appearance: He is ill-appearing and toxic-appearing.   HENT:      Head: Normocephalic and atraumatic.      Right Ear: Tympanic membrane normal.      Left Ear: Tympanic membrane normal.   Eyes:      Pupils: Pupils are equal, round, and reactive to light.   Cardiovascular:      Rate and Rhythm: Tachycardia present.   Abdominal:      Palpations: Abdomen is soft.   Musculoskeletal:         General: Normal range of motion.      Cervical back: Rigidity present.   Skin:     Capillary Refill: Capillary refill takes less than 2 seconds.   Neurological:      Mental Status: He is disoriented.        Current Meds   aspirin, 81 mg, oral, Daily  atorvastatin, 40 mg, oral, Nightly  docusate sodium, 100 mg, oral, Daily  enoxaparin, 40 mg, subcutaneous, q24h  ferrous sulfate (325 mg ferrous sulfate), 65 mg of iron, oral, Daily with breakfast  haloperidol decanoate, 300 mg, intramuscular, q28 days  insulin glargine, 20 Units, subcutaneous, Nightly  insulin lispro, 0-10 Units, subcutaneous, q6h  insulin lispro, 18 Units, subcutaneous, TID AC  lisinopril, 20 mg, oral, Daily  [Held by provider] metFORMIN, 1,000 mg, oral, BID  polyethylene glycol, 17 g, oral, Daily  propranolol, 10 mg, oral, TID  thiamine, 100 mg, oral, Daily       PRN medications: dextrose, dextrose, dextrose, dextrose, glucagon, glucagon, glucagon, glucagon, LORazepam, metoprolol     LABS and IMAGING     No results found for: \"WBC\", \"HGB\", \"HCT\", \"GLU\", \"CO2\", \"CREATININE\", \"CALCIUM\", \"INR\", \"PTT\", \"TROPONINI\"      FL guided lumbar puncture  Narrative: Interpreted By:  Maurisio Rodriguez and Stevens Alex   STUDY:  FL GUIDED LUMBAR PUNCTURE;  11/8/2024 2:47 pm      INDICATION:  Signs/Symptoms:lumbar puncture-- several attempts in the ED.          COMPARISON:  None.      ACCESSION NUMBER(S):  [id number]      ORDERING CLINICIAN:  LUIS CORTÉS      TECHNIQUE:  After discussion of " risks, benefits, and alternatives, oral and  written informed consent was obtained. The patient was placed in  prone position on exam table. The L3-4 interspace was then marked  under fluoroscopy. The patient was then prepped and draped in sterile  fashion. Local anesthesia was achieved with 2% Lidocaine solution. A  20 gauge spinal needle was then introduced at the L3-L4 level under  direct fluoroscopic guidance until return of CSF was demonstrated. 10  ml of clear CSF was collected in 4 tubes. The stylet was then  replaced and the spinal needle was removed.  Hemostasis was achieved  with direct pressure and the area was dressed with a Band-Aid. The  patient tolerated procedure well without any immediate or clinically  apparent complications. Total fluoroscopy time was 0.5 MINUTES.      The collected CSF was then sent to the lab for appropriate lab tests  as ordered by the referring physician.      FINDINGS:  A single periprocedural spot fluoroscopic image was provided for  interpretation. Image quality is such that fine bony detail is  obscured. A needle is seen to overlie the posterior elements of L4.      Impression: Successful fluoroscopic guided lumbar puncture.      I personally reviewed the images/study and I agree with the findings  as stated by Endy Boss DO PGY-2. This study was interpreted at  [place], [place].      MACRO:  None      Signed by: Maurisio Rodriguez 11/11/2024 2:34 PM  Dictation workstation:   [id number]         PROBLEM LISTS      Problem List Items Addressed This Visit       * (Principal) Altered mental status, unspecified altered mental status type - Primary    Relevant Medications    haloperidol decanoate (Haldol Decanoate) 100 mg/mL injection (Start on 12/12/2024)    insulin lispro 100 unit/mL injection    insulin lispro 100 unit/mL injection    insulin glargine (Lantus) 100 unit/mL injection    melatonin 3 mg tablet    Other Relevant  Orders    Referral to Primary Care - Family Practice       This dictation was created with voice recognition software. Although every effort is made to review the dictation as it is transcribed, on occasion spoken words, phrases, names, numbers, punctuation etc can be misinterpreted by the the technology leading to omissions or inappropriate outcomes.     Rick Cadet MD

## 2024-11-18 NOTE — PROGRESS NOTES
Clayton Parra is a 59 y.o. male on hospital day 12 of admission presenting with Altered mental status, unspecified altered mental status type.    Subjective   Patient seen seated in bedside chair with the psychiatry team.  When asked how he is doing he states I am not well.  When asked what is wrong he states depression.  When asked if this is something that is common for him he states not all the time when asked how his weekend was he stated just wonderful.  He denies any SI or HI AVH when asked.  He stated he had not had breakfast when the writer saw him but endorses he was peeing and pooping okay. When asked if he know's what the plan is for him he states that he's not sure.    Objective     Vital Signs:      11/16/2024     8:17 AM 11/16/2024     3:17 PM 11/16/2024    11:00 PM 11/17/2024     8:19 AM 11/17/2024     4:12 PM 11/17/2024    11:00 PM 11/18/2024     7:42 AM   Vitals   Systolic 152 152 -- 140 151  171   Diastolic 86 78 -- 71 79  77   Heart Rate 75 79 -- 67 74  69   Temp 36.1 °C (97 °F) 37 °C (98.6 °F) -- 36.6 °C (97.9 °F) 36.6 °C (97.9 °F) -- 36.6 °C (97.9 °F)   Resp 17   17         Intake/Output last 3 Shifts:  No intake/output data recorded.    Mental Status Exam:  General/Appearance: Moderate Psychological Distress, appears younger than stated age, in hospital gown, laying in bed, body habitus: overweight, grooming/hygiene is reasonable for setting, bald  Attitude/Behavior: engages in interview, suspicious, secretive, appropriate eye contact, calm, slightly guarded  Motor Activity: no psychomotor agitation/retardation, no tics/tremors, no EPS/TD, gait: not assessed  Mood: unable to assess  Affect: Quality-fearful, Intensity-blunted, Range-constricted  Speech:  fast rate, normal rhythm, normal tone, lowered volume, fast prosody, pressured  Thought Process: disorganized, derailment, vague  Thought Content: unable to assess, Delusional thinking: paranoid  Thought Perception: denies AVH  Cognition:  no overt deficits noted, not formally assessed  Insight: poor  Judgment: poor    Current Medications  Scheduled   aspirin, 81 mg, oral, Daily  atorvastatin, 40 mg, oral, Nightly  docusate sodium, 100 mg, oral, Daily  enoxaparin, 40 mg, subcutaneous, q24h  ferrous sulfate (325 mg ferrous sulfate), 65 mg of iron, oral, Daily with breakfast  haloperidol decanoate, 300 mg, intramuscular, q28 days  insulin glargine, 20 Units, subcutaneous, Nightly  insulin lispro, 0-10 Units, subcutaneous, q6h  insulin lispro, 18 Units, subcutaneous, TID AC  lisinopril, 20 mg, oral, Daily  [Held by provider] metFORMIN, 1,000 mg, oral, BID  polyethylene glycol, 17 g, oral, Daily  propranolol, 10 mg, oral, TID  thiamine, 100 mg, oral, Daily      Continuous      PRN   PRN medications: dextrose, dextrose, dextrose, dextrose, glucagon, glucagon, glucagon, glucagon, LORazepam, metoprolol     Labs  Results for orders placed or performed during the hospital encounter of 11/06/24 (from the past 24 hours)   POCT GLUCOSE   Result Value Ref Range    POCT Glucose 234 (H) 74 - 99 mg/dL   POCT GLUCOSE   Result Value Ref Range    POCT Glucose 225 (H) 74 - 99 mg/dL   POCT GLUCOSE   Result Value Ref Range    POCT Glucose 229 (H) 74 - 99 mg/dL   POCT GLUCOSE   Result Value Ref Range    POCT Glucose 183 (H) 74 - 99 mg/dL        Imaging  No results found.     Psychiatric Risk Assessment  Acute Risk of Harm to Others is Considered: Low  Acute Risk of Harm to Self is Considered: Low    Assessment:  59M history of schizophrenia (bipolar?) and diabetes, who returned to ED from inpatient psych for medical exam due to worsening alterations in mentation, refusal to take PO, and unstable vital signs. Psychiatry was consulted on 11/6 for psychiatric evaluation. His outpatient medication regimen appears to be benztropine 0.5 mg BID, carbamazepine 400 mg nightly, clozapine 500 mg nightly, propranolol 10 mg TID.     11/18: Patient seen this morning on rounds. Remains  stable from catatonia standpoint, however continues to have some mildly pressured speech today. He does also appear to have some mild psychosis present with some responses to internal stimuli. Speech is also disorganized with some derailment and now in apparent psychosis. Did increase his Haldol Dec to 300mg Q monthly. He did get this last week. Hopefully once this comes to a steady state we will see if there is any improvement in terms of his psychosis. At this point placement to an inpatient Psychiatric unit is the main issue for the patient, given he is out of medicare bed days. He may benefit from long term inpatient psychiatric placement and stabilization. However this would require him to be on the unit for some time until placement like this is found. At this time likely a SNF placement would be beneficial for him given how well behaviorally he is doing.    Impression:  Psychosis  Catatonia   Schizophrenia    Recommendations:  Safety/Monitoring:  Patient does not meet criteria for inpatient psychiatric admission  Patient does not require 1:1 observation from a psychiatric perspective, defer to primary team  Daily interdisciplinary safety and planning huddle with Psychiatry  Video monitoring as with all patients on the MPU  Psychiatry will follow patient daily while on the MPU    Medications:  - Discontinue clozapine  - Discontinue scheduled ativan 1mg Q6 IV  - Discontinue ativan 1 mg IM q6h PRN severe agitation  - Continue haldol decanoate 300mg every 28 days to be given today (11/14)  - Continue IV ativan 1 mg Q8h PRN agitation, can add PO/IM if no IV access    Considerations:  - bowel regimen per primary given clozapine discontinuation    Therapies recommended:  art therapy, music therapy, pet therapy, recreational therapy, and will continue to assess for benefit and meaningful participation    Delirium Guidelines  Provide glasses, hearing aids and/or communication boards as needed for impairments  Frequent  reorientation, minimize room and staff changes  Open blinds during the day, dark/quiet room at night   Minimal interruptions and daytime naps  Early evaluation and intervention by PT, out of bed as tolerated  Minimize use of restraints   Minimize use of benzodiazepines, anticholinergic medications, and opiates (while ensuring adequate treatment of pain)  Keep Mg>2, K>4 (as able)  Ensure regular bowel and bladder function (as able)    Disposition/Discharge Planning:  SW following, appreciate assistance      Multidisciplinary Rounding  Consulting Physician, Fellow, Medical Student, Charge Nurse, Nurse, Pharmacist, Social Work, Care Coordinator, Dietician, Music Therapy, Art Therapy, Recreational Therapy, and     Medication Consent  N/A - Consult Service    Patient seen and discussed with attending, Dr. Vaca.    Michael Israel MD  PGY-5 Consult Liaison Psychiatry Fellow

## 2024-11-19 LAB
ALBUMIN SERPL BCP-MCNC: 4 G/DL (ref 3.4–5)
ANION GAP SERPL CALC-SCNC: 16 MMOL/L (ref 10–20)
BUN SERPL-MCNC: 11 MG/DL (ref 6–23)
CALCIUM SERPL-MCNC: 9.1 MG/DL (ref 8.6–10.6)
CHLORIDE SERPL-SCNC: 101 MMOL/L (ref 98–107)
CO2 SERPL-SCNC: 25 MMOL/L (ref 21–32)
CREAT SERPL-MCNC: 0.72 MG/DL (ref 0.5–1.3)
EGFRCR SERPLBLD CKD-EPI 2021: >90 ML/MIN/1.73M*2
ERYTHROCYTE [DISTWIDTH] IN BLOOD BY AUTOMATED COUNT: 12.7 % (ref 11.5–14.5)
GLUCOSE BLD MANUAL STRIP-MCNC: 115 MG/DL (ref 74–99)
GLUCOSE BLD MANUAL STRIP-MCNC: 162 MG/DL (ref 74–99)
GLUCOSE BLD MANUAL STRIP-MCNC: 172 MG/DL (ref 74–99)
GLUCOSE BLD MANUAL STRIP-MCNC: 222 MG/DL (ref 74–99)
GLUCOSE SERPL-MCNC: 137 MG/DL (ref 74–99)
HCT VFR BLD AUTO: 39.3 % (ref 41–52)
HGB BLD-MCNC: 14 G/DL (ref 13.5–17.5)
MCH RBC QN AUTO: 31.5 PG (ref 26–34)
MCHC RBC AUTO-ENTMCNC: 35.6 G/DL (ref 32–36)
MCV RBC AUTO: 88 FL (ref 80–100)
NRBC BLD-RTO: 0 /100 WBCS (ref 0–0)
PHOSPHATE SERPL-MCNC: 2.7 MG/DL (ref 2.5–4.9)
PLATELET # BLD AUTO: 224 X10*3/UL (ref 150–450)
POTASSIUM SERPL-SCNC: 3.7 MMOL/L (ref 3.5–5.3)
RBC # BLD AUTO: 4.45 X10*6/UL (ref 4.5–5.9)
SODIUM SERPL-SCNC: 138 MMOL/L (ref 136–145)
WBC # BLD AUTO: 5.7 X10*3/UL (ref 4.4–11.3)

## 2024-11-19 PROCEDURE — 82947 ASSAY GLUCOSE BLOOD QUANT: CPT

## 2024-11-19 PROCEDURE — 1100000001 HC PRIVATE ROOM DAILY

## 2024-11-19 PROCEDURE — 2500000001 HC RX 250 WO HCPCS SELF ADMINISTERED DRUGS (ALT 637 FOR MEDICARE OP): Performed by: STUDENT IN AN ORGANIZED HEALTH CARE EDUCATION/TRAINING PROGRAM

## 2024-11-19 PROCEDURE — 99232 SBSQ HOSP IP/OBS MODERATE 35: CPT | Performed by: STUDENT IN AN ORGANIZED HEALTH CARE EDUCATION/TRAINING PROGRAM

## 2024-11-19 PROCEDURE — 85027 COMPLETE CBC AUTOMATED: CPT | Performed by: STUDENT IN AN ORGANIZED HEALTH CARE EDUCATION/TRAINING PROGRAM

## 2024-11-19 PROCEDURE — 2500000002 HC RX 250 W HCPCS SELF ADMINISTERED DRUGS (ALT 637 FOR MEDICARE OP, ALT 636 FOR OP/ED): Performed by: STUDENT IN AN ORGANIZED HEALTH CARE EDUCATION/TRAINING PROGRAM

## 2024-11-19 PROCEDURE — 80069 RENAL FUNCTION PANEL: CPT | Performed by: STUDENT IN AN ORGANIZED HEALTH CARE EDUCATION/TRAINING PROGRAM

## 2024-11-19 PROCEDURE — 36415 COLL VENOUS BLD VENIPUNCTURE: CPT | Performed by: STUDENT IN AN ORGANIZED HEALTH CARE EDUCATION/TRAINING PROGRAM

## 2024-11-19 ASSESSMENT — PAIN SCALES - GENERAL
PAINLEVEL_OUTOF10: 0 - NO PAIN
PAINLEVEL_OUTOF10: 0 - NO PAIN

## 2024-11-19 NOTE — PROGRESS NOTES
INTERNAL MEDICINE PROGRESS NOTE     BRIEF NARRATIVE      Clayton Parra is a 59 y.o. male on day 13 of admission presenting with Altered mental status, unspecified altered mental status type.    Pt with significant psychiatric history including schizophrenia schizoaffective bipolar, T2DM  HLD, anemia, HTN, who originally presented to the ED from [facility] for refusing his psych medications.  Was deemed to not have capacity and sent to [facility] for psych admission.  Upon arrival at [facility] patient was found to be alert but nonverbal and not oriented to self place or time.  On arrival to the ED he was found to be diaphoretic rigid. Psych consulted, found to be with catatonia and encephalopathy (resolved). Pt is clear for discharge at this point to SNF, he does not need inpatient psych admission. Psych team adjusted his psych meds, plan is to keep with Haldol Dec 300mg q28 days monotherapy.   Dispo: [facility]. SW sent over clinicals to [facility] to start the auth.   Medically clear. Patients insurance is requesting Level II Evaluation be completed prior to issuing new authorization, date pending     ASSESSMENT / PLANS      #Catatonia - (resolved)     #Acute encephalopathy, multifactorial, psychosis v toxic v infectious - (resolved)   #H/o Schizophrenia - now stable on antipsychotic    #Xanthochromic CSF without evidence of SAH   Etiology of a catatonia is most likely secondary to antipsychotic. However differential includes possible NMS v serotonin syndrome (tachycardia, hypertension, rigidity, mixed clonus and hyperreflexia), meningitis/ encephalitis less likely (negative white count, bcx neg, nuchal  rigidity noted but  Kernig and Brudzinski signs negative).   CT head on 11/6 negative for intracranial hemorrhage   - S/p Acyclovir, ampicillin, ceftriaxone and vancomycin in the ED for empiric meningitis coverage. Bcx, WBC negative, afebrile, procal neg. No infectious source identified so far, Will not initiate abx at the moment.    - LP 11/8 : Neutrophils 42, protein 83, glucose 151, RBC 3000.   - Psych following.      - Discontinued clozapine     - Discontinued scheduled ativan 1mg Q6 IV     - Discontinued ativan 1 mg IM q6h PRN severe agitation     - Continue haldol decanoate 300mg every 28 days to be given 11/14     - Continue IV ativan 1 mg Q8h PRN agitation, can add PO/IM if no IV access  - [place] not meet inpatient psych admission   - Cont fall precaution   - No need for 1:1 sitter  - PT/OT       > T2DM  - Hold home metformin  - Home insulin regimen: Lantus 70 units nightly, Humalog 18 units 3 times daily with meals  - BG fluctuating between 127- 262. For now will continue Lantus 10u nightly , Lispro 18u TID with meals and #1 SSI. Goal -180   - Cont accucheck      > HTN  > HLD  -Will hold home aspirin and atorvastatin and lisinopril for now until patient is able to take p.o.  - Cont IV lopressor 5mg for SBP > 160  DBP > 110      > Anemia  -Hgb stable  -Will hold home iron     Fluids: monitor and replete as needed  Electrolytes: monitor and replete as needed  Nutrition: Regular diet   GI prophylaxis: as needed  DVT prophylaxis: SCD  Code Status: full code    SUBJECTIVE       Pt resting comfortably in bed in NAD.     OBJECTIVE      Visit Vitals  /77   Pulse 69   Temp 36.6 °C (97.9 °F)   Resp 17      No intake or output data in the 24 hours ending 11/19/24 0754    Physical Exam   Constitutional:       Appearance: He is ill-appearing and toxic-appearing.   HENT:      Head: Normocephalic and atraumatic.      Right Ear: Tympanic membrane normal.      Left Ear: Tympanic membrane normal.  "  Eyes:      Pupils: Pupils are equal, round, and reactive to light.   Cardiovascular:      Rate and Rhythm: Tachycardia present.   Abdominal:      Palpations: Abdomen is soft.   Musculoskeletal:         General: Normal range of motion.      Cervical back: Rigidity present.   Skin:     Capillary Refill: Capillary refill takes less than 2 seconds.   Neurological:      Mental Status: He is disoriented.     Current Meds   aspirin, 81 mg, oral, Daily  atorvastatin, 40 mg, oral, Nightly  docusate sodium, 100 mg, oral, Daily  enoxaparin, 40 mg, subcutaneous, q24h  ferrous sulfate (325 mg ferrous sulfate), 65 mg of iron, oral, Daily with breakfast  haloperidol decanoate, 300 mg, intramuscular, q28 days  insulin glargine, 20 Units, subcutaneous, Nightly  insulin lispro, 0-10 Units, subcutaneous, q6h  insulin lispro, 18 Units, subcutaneous, TID AC  lisinopril, 20 mg, oral, Daily  [Held by provider] metFORMIN, 1,000 mg, oral, BID  polyethylene glycol, 17 g, oral, Daily  propranolol, 10 mg, oral, TID  thiamine, 100 mg, oral, Daily       PRN medications: dextrose, dextrose, dextrose, dextrose, glucagon, glucagon, glucagon, glucagon, LORazepam, metoprolol     LABS and IMAGING     No results found for: \"WBC\", \"HGB\", \"HCT\", \"GLU\", \"CO2\", \"CREATININE\", \"CALCIUM\", \"INR\", \"PTT\", \"TROPONINI\"    FL guided lumbar puncture  Narrative: Interpreted By:  Maurisio Rodriguez and Stevens Alex   STUDY:  FL GUIDED LUMBAR PUNCTURE;  11/8/2024 2:47 pm      INDICATION:  Signs/Symptoms:lumbar puncture-- several attempts in the ED.          COMPARISON:  None.      ACCESSION NUMBER(S):  [id number]      ORDERING CLINICIAN:  LUIS CORTÉS      TECHNIQUE:  After discussion of risks, benefits, and alternatives, oral and  written informed consent was obtained. The patient was placed in  prone position on exam table. The L3-4 interspace was then marked  under fluoroscopy. The patient was then prepped and draped in sterile  fashion. Local anesthesia was " achieved with 2% Lidocaine solution. A  20 gauge spinal needle was then introduced at the L3-L4 level under  direct fluoroscopic guidance until return of CSF was demonstrated. 10  ml of clear CSF was collected in 4 tubes. The stylet was then  replaced and the spinal needle was removed.  Hemostasis was achieved  with direct pressure and the area was dressed with a Band-Aid. The  patient tolerated procedure well without any immediate or clinically  apparent complications. Total fluoroscopy time was 0.5 MINUTES.      The collected CSF was then sent to the lab for appropriate lab tests  as ordered by the referring physician.      FINDINGS:  A single periprocedural spot fluoroscopic image was provided for  interpretation. Image quality is such that fine bony detail is  obscured. A needle is seen to overlie the posterior elements of L4.      Impression: Successful fluoroscopic guided lumbar puncture.      I personally reviewed the images/study and I agree with the findings  as stated by Endy Boss DO PGY-2. This study was interpreted at  [place], [place].      MACRO:  None      Signed by: Maurisio Rodriguez 11/11/2024 2:34 PM  Dictation workstation:   [id number]         PROBLEM LISTS      Problem List Items Addressed This Visit          Neuro    * (Principal) Altered mental status, unspecified altered mental status type - Primary    Relevant Medications    haloperidol decanoate (Haldol Decanoate) 100 mg/mL injection (Start on 12/12/2024)    insulin lispro 100 unit/mL injection    insulin lispro 100 unit/mL injection    insulin glargine (Lantus) 100 unit/mL injection    melatonin 3 mg tablet    Other Relevant Orders    Referral to Primary Care - Family Practice       This dictation was created with voice recognition software. Although every effort is made to review the dictation as it is transcribed, on occasion spoken words, phrases, names, numbers, punctuation etc can be  misinterpreted by the the technology leading to omissions or inappropriate outcomes.     Geraldine Rojas MD

## 2024-11-19 NOTE — CARE PLAN
The clinical goals for the shift include patient will be safe throughout shift    Over the shift, patient refused care, including meds.     Patient did make progress toward the following goals.       Problem: Fall/Injury  Goal: Verbalize understanding of personal risk factors for fall in the hospital  Outcome: Progressing  Goal: Verbalize understanding of risk factor reduction measures to prevent injury from fall in the home  Outcome: Progressing  Goal: Use assistive devices by end of the shift  Outcome: Progressing  Goal: Pace activities to prevent fatigue by end of the shift  Outcome: Progressing     Problem: Skin  Goal: Decreased wound size/increased tissue granulation at next dressing change  Outcome: Progressing  Goal: Participates in plan/prevention/treatment measures  Outcome: Progressing  Goal: Prevent/manage excess moisture  Outcome: Progressing  Goal: Prevent/minimize sheer/friction injuries  Outcome: Progressing  Goal: Promote/optimize nutrition  Outcome: Progressing  Goal: Promote skin healing  Outcome: Progressing

## 2024-11-19 NOTE — PROGRESS NOTES
Clayton Parra is a 59 y.o. y.o. male on day 13 of admission presenting with Altered mental status, unspecified altered mental status type [R41.82].     Subjective   Patient remains medically ready for discharge and is currently not meeting criteria for inpatient psychiatry. Patient is pending authorization to discharge to [facility] and needs Level II Evaluation by the Board of Mental Health.    Received phone call from Ruby Palomino ([phone]) with [facility]; she reports that she will be coming to the hospital to complete the Level II Evaluation tomorrow. She requested that clinicals be faxed to her at [phone] for further review. Encouraged her to call back with any questions or concerns.    MD updated on above; [facility] will continue to follow.    - Halina QUIJANO MA, LSW  Care Transitions   [facility] Secure Chat or [phone]

## 2024-11-19 NOTE — PROGRESS NOTES
Clayton Parra is a 59 y.o. male on hospital day 13 of admission presenting with Altered mental status, unspecified altered mental status type.    Subjective   Patient seen seated in bedside chair with the patient advocate, and nurse. The patient was being encouraged to try and take his medications. He kept saying to the nurse no means no. He kept saying the diabetes is only 1 tablet.  When asked how he is doing he states not so good.  When asked if he would take the medications he states no know what he meant.  When asked if he was sleeping he states I am not really sleeping.  He denied SI or HI AVH when asked    Objective   Fine tremor present bilaterally in upper limbs.    Vital Signs:      11/16/2024     8:17 AM 11/16/2024     3:17 PM 11/16/2024    11:00 PM 11/17/2024     8:19 AM 11/17/2024     4:12 PM 11/17/2024    11:00 PM 11/18/2024     7:42 AM   Vitals   Systolic 152 152 -- 140 151  171   Diastolic 86 78 -- 71 79  77   Heart Rate 75 79 -- 67 74  69   Temp 36.1 °C (97 °F) 37 °C (98.6 °F) -- 36.6 °C (97.9 °F) 36.6 °C (97.9 °F) -- 36.6 °C (97.9 °F)   Resp 17   17         Intake/Output last 3 Shifts:  No intake/output data recorded.    Mental Status Exam:  General/Appearance: Moderate Psychological Distress, appears younger than stated age, in hospital gown, laying in bed, body habitus: overweight, grooming/hygiene is reasonable for setting, bald  Attitude/Behavior: engages in interview, suspicious, secretive, appropriate eye contact, calm, slightly guarded  Motor Activity: no psychomotor agitation/retardation, no tics/tremors, no EPS/TD, gait: not assessed  Mood: unable to assess  Affect: Quality-fearful, Intensity-blunted, Range-constricted  Speech:  fast rate, normal rhythm, normal tone, lowered volume, fast prosody, pressured  Thought Process: disorganized, derailment, vague  Thought Content: unable to assess, Delusional thinking: paranoid  Thought Perception: denies AVH  Cognition: no overt deficits  noted, not formally assessed  Insight: poor  Judgment: poor    Current Medications  Scheduled   aspirin, 81 mg, oral, Daily  atorvastatin, 40 mg, oral, Nightly  docusate sodium, 100 mg, oral, Daily  enoxaparin, 40 mg, subcutaneous, q24h  ferrous sulfate (325 mg ferrous sulfate), 65 mg of iron, oral, Daily with breakfast  haloperidol decanoate, 300 mg, intramuscular, q28 days  insulin glargine, 20 Units, subcutaneous, Nightly  insulin lispro, 0-10 Units, subcutaneous, q6h  insulin lispro, 18 Units, subcutaneous, TID AC  lisinopril, 20 mg, oral, Daily  [Held by provider] metFORMIN, 1,000 mg, oral, BID  polyethylene glycol, 17 g, oral, Daily  propranolol, 10 mg, oral, TID  thiamine, 100 mg, oral, Daily      Continuous      PRN   PRN medications: dextrose, dextrose, dextrose, dextrose, glucagon, glucagon, glucagon, glucagon, LORazepam, metoprolol     Labs  Results for orders placed or performed during the hospital encounter of 11/06/24 (from the past 24 hours)   POCT GLUCOSE   Result Value Ref Range    POCT Glucose 211 (H) 74 - 99 mg/dL   POCT GLUCOSE   Result Value Ref Range    POCT Glucose 172 (H) 74 - 99 mg/dL        Imaging  No results found.     Psychiatric Risk Assessment  Acute Risk of Harm to Others is Considered: Low  Acute Risk of Harm to Self is Considered: Low    Assessment:  59M history of schizophrenia (bipolar?) and diabetes, who returned to ED from inpatient psych for medical exam due to worsening alterations in mentation, refusal to take PO, and unstable vital signs. Psychiatry was consulted on 11/6 for psychiatric evaluation. His outpatient medication regimen appears to be benztropine 0.5 mg BID, carbamazepine 400 mg nightly, clozapine 500 mg nightly, propranolol 10 mg TID.     11/19: Patient seen this morning on rounds. Remains stable from catatonia standpoint, however continues to have some mildly pressured speech today. He does also appear to have some mild psychosis present with some responses to  internal stimuli. Speech is also disorganized with some derailment and some apparent psychosis. Did increase his Haldol Dec to 300mg Q monthly. He did get this last week. Hopefully once this comes to a steady state we will see if there is any improvement in terms of his psychosis. At this point placement to an inpatient Psychiatric unit is the main issue for the patient, given he is out of medicare bed days. He may benefit from long term inpatient psychiatric placement and stabilization. However this would require him to be on the unit for some time until placement like this is found. At this time likely a SNF placement would be beneficial for him given how well behaviorally he is doing.    Impression:  Psychosis  Catatonia   Schizophrenia    Recommendations:  Safety/Monitoring:  Patient does not meet criteria for inpatient psychiatric admission  Patient does not require 1:1 observation from a psychiatric perspective, defer to primary team  Daily interdisciplinary safety and planning huddle with Psychiatry  Video monitoring as with all patients on the MPU  Psychiatry will follow patient daily while on the MPU    Medications:  - Discontinue clozapine  - Discontinue scheduled ativan 1mg Q6 IV  - Discontinue ativan 1 mg IM q6h PRN severe agitation  - Continue haldol decanoate 300mg every 28 days to be given today (11/14)  - Continue IV ativan 1 mg Q8h PRN agitation, can add PO/IM if no IV access    Considerations:  - bowel regimen per primary given clozapine discontinuation    Therapies recommended:  art therapy, music therapy, pet therapy, recreational therapy, and will continue to assess for benefit and meaningful participation    Delirium Guidelines  Provide glasses, hearing aids and/or communication boards as needed for impairments  Frequent reorientation, minimize room and staff changes  Open blinds during the day, dark/quiet room at night   Minimal interruptions and daytime naps  Early evaluation and intervention  by PT, out of bed as tolerated  Minimize use of restraints   Minimize use of benzodiazepines, anticholinergic medications, and opiates (while ensuring adequate treatment of pain)  Keep Mg>2, K>4 (as able)  Ensure regular bowel and bladder function (as able)    Disposition/Discharge Planning:  SW following, appreciate assistance      Multidisciplinary Rounding  Consulting Physician, Fellow, Medical Student, Charge Nurse, Nurse, Pharmacist, Social Work, Care Coordinator, Dietician, Music Therapy, Art Therapy, Recreational Therapy, and     Medication Consent  N/A - Consult Service    Patient seen and discussed with attending, Dr. Vaca.    Michael Israel MD  PGY-5 Consult Liaison Psychiatry Fellow

## 2024-11-19 NOTE — CARE PLAN
The clinical goals for the shift include patient will remain safe/comfortable, free of falls/injuries and be medically compliant.    Over the shift, the patient remained safe and free of falls/injuries. Patient allowed RN to take BP and

## 2024-11-20 LAB
GLUCOSE BLD MANUAL STRIP-MCNC: 157 MG/DL (ref 74–99)
GLUCOSE BLD MANUAL STRIP-MCNC: 179 MG/DL (ref 74–99)

## 2024-11-20 PROCEDURE — 82947 ASSAY GLUCOSE BLOOD QUANT: CPT

## 2024-11-20 PROCEDURE — 99233 SBSQ HOSP IP/OBS HIGH 50: CPT | Performed by: PSYCHIATRY & NEUROLOGY

## 2024-11-20 PROCEDURE — 99232 SBSQ HOSP IP/OBS MODERATE 35: CPT | Performed by: STUDENT IN AN ORGANIZED HEALTH CARE EDUCATION/TRAINING PROGRAM

## 2024-11-20 PROCEDURE — 1100000001 HC PRIVATE ROOM DAILY

## 2024-11-20 ASSESSMENT — COLUMBIA-SUICIDE SEVERITY RATING SCALE - C-SSRS
2. HAVE YOU ACTUALLY HAD ANY THOUGHTS OF KILLING YOURSELF?: NO
6. HAVE YOU EVER DONE ANYTHING, STARTED TO DO ANYTHING, OR PREPARED TO DO ANYTHING TO END YOUR LIFE?: NO
1. SINCE LAST CONTACT, HAVE YOU WISHED YOU WERE DEAD OR WISHED YOU COULD GO TO SLEEP AND NOT WAKE UP?: NO
6. HAVE YOU EVER DONE ANYTHING, STARTED TO DO ANYTHING, OR PREPARED TO DO ANYTHING TO END YOUR LIFE?: NO
1. SINCE LAST CONTACT, HAVE YOU WISHED YOU WERE DEAD OR WISHED YOU COULD GO TO SLEEP AND NOT WAKE UP?: NO
2. HAVE YOU ACTUALLY HAD ANY THOUGHTS OF KILLING YOURSELF?: NO

## 2024-11-20 ASSESSMENT — PAIN SCALES - GENERAL
PAINLEVEL_OUTOF10: 0 - NO PAIN
PAINLEVEL_OUTOF10: 0 - NO PAIN

## 2024-11-20 NOTE — PROGRESS NOTES
Physical Therapy                 Therapy Communication Note    Patient Name: Clayton Parra  MRN: [id number]  Department: [facility] [address]  Room: [address] [place] [address]-  Today's Date: 11/20/2024     Discipline: Physical Therapy    Missed Visit Reason: Missed Visit Reason: Patient refused (Pt sitting up EOB upon entry. Pt initially agreeable to therapy and then changing his mind and reurning himself supine. Pt stating he wants to rest. Will re attempt as available.)    Missed Time: Attempt    Comment:

## 2024-11-20 NOTE — CARE PLAN
Problem: Fall/Injury  Goal: Verbalize understanding of personal risk factors for fall in the hospital  11/20/2024 0040 by Blas Boyd RN  Outcome: Progressing  11/20/2024 0019 by Blas Boyd RN  Outcome: Progressing     Problem: Fall/Injury  Goal: Use assistive devices by end of the shift  11/20/2024 0040 by Blas Boyd RN  Outcome: Progressing  11/20/2024 0019 by Blas Boyd RN  Outcome: Progressing     Problem: Fall/Injury  Goal: Verbalize understanding of risk factor reduction measures to prevent injury from fall in the home  11/20/2024 0040 by Blas Boyd RN  Outcome: Progressing  11/20/2024 0019 by Blas Boyd RN  Outcome: Progressing   The patient's goals for the shift include      The clinical goals for the shift include Pt will remain safe and free of fall or injuries. Pt will be compliant with his medications during this shift.    Over the shift, the patient did not make progress toward the following goals. Barriers to progression include refusing medications and care. Recommendations to address these barriers include educate Pt and offer scheduled meds. Pt remained safe during this shift. No falls or injuries.

## 2024-11-20 NOTE — CARE PLAN
The patient's goals for the shift include      The clinical goals for the shift include Pt will remain free of falls and injury throughout the shift     Over the shift, the patient Pt remained free of falls and injury. Pt continues to refuse PO meds, team aware.Pt educated and continued to refuse. Pt in room call light within reach.

## 2024-11-20 NOTE — PROGRESS NOTES
"Clayton Parra is a 59 y.o. male on hospital day 14 of admission presenting with Altered mental status, unspecified altered mental status type.    Subjective   Patient was seen at bedside today and gave primarily one to three word answers, which limited interview.   Mr. Parra reports he is feeling \"positive\" today and denies SI/HI/AVH and denies any side effects from his medications he is currently taking. When asked why he was in the hospital, he answered \"recovery. Mental health.\" Then later elaborated that he also was in the hospital for diabetes management. He reports he feels afe on the unit and denies any acute concerns. He is alert and oriented to person, place, year, situation and location and remained pleasant and cooperative through the interview. When asked if he was interested in music or art therapy, he wondered aloud if guitar was an option, and said he would be interested in music therapy.    Objective   -Mild fine resting tremors b/L UE.    Vital Signs:      11/17/2024     4:12 PM 11/17/2024    11:00 PM 11/18/2024     7:42 AM 11/19/2024     9:08 AM 11/19/2024     4:00 PM 11/19/2024    11:52 PM 11/20/2024     7:45 AM   Vitals   Systolic 151  171 145 145 163 147   Diastolic 79  77 82 84 75 79   Heart Rate 74  69 89 76 63 69   Temp 36.6 °C (97.9 °F) -- 36.6 °C (97.9 °F) 36.8 °C (98.2 °F) 36.5 °C (97.7 °F) 36.7 °C (98.1 °F) 36.3 °C (97.3 °F)   Resp      18 18      Intake/Output last 3 Shifts:  No intake/output data recorded.    Mental Status Exam:  General/Appearance: No acute distress, laying in bed I hospital gown, grooming and hygiene adequate. Bald male, appears younger than stated age.  Attitude/Behavior: engages in interview, initially guarded but then open and polite on interview. Calm, appropriate eye contact.  Motor Activity: no psychomotor agitation/retardation, no tics/tremors, no EPS/TD, gait: not assessed  Mood: \"positive\"  Affect: Blunted, cooperative, guarded.  Speech: speaks " softly, normal rate, speaks in 1-3 word sentence fragments.  Thought Process: Does not appear to be disorganized in this limited interview but often blurted out comments that gave impression of distractability.   Thought Content: unable to assess, Delusional thinking: paranoid per chart review but no delusions elicited today. Patient states he feels safe on the unit.  Thought Perception: denies AVH, does not appear internally stimulated.  Cognition: no overt deficits noted, not formally assessed  Insight: limited  Judgment: limited    Current Medications  Scheduled   aspirin, 81 mg, oral, Daily  atorvastatin, 40 mg, oral, Nightly  docusate sodium, 100 mg, oral, Daily  enoxaparin, 40 mg, subcutaneous, q24h  ferrous sulfate (325 mg ferrous sulfate), 65 mg of iron, oral, Daily with breakfast  haloperidol decanoate, 300 mg, intramuscular, q28 days  insulin glargine, 20 Units, subcutaneous, Nightly  insulin lispro, 0-10 Units, subcutaneous, q6h  insulin lispro, 18 Units, subcutaneous, TID AC  lisinopril, 20 mg, oral, Daily  [Held by provider] metFORMIN, 1,000 mg, oral, BID  polyethylene glycol, 17 g, oral, Daily  propranolol, 10 mg, oral, TID  thiamine, 100 mg, oral, Daily        PRN   PRN medications: dextrose, dextrose, dextrose, dextrose, glucagon, glucagon, glucagon, glucagon, LORazepam, metoprolol     Labs  Results for orders placed or performed during the hospital encounter of 11/06/24 (from the past 24 hours)   POCT GLUCOSE   Result Value Ref Range    POCT Glucose 115 (H) 74 - 99 mg/dL   POCT GLUCOSE   Result Value Ref Range    POCT Glucose 162 (H) 74 - 99 mg/dL   POCT GLUCOSE   Result Value Ref Range    POCT Glucose 157 (H) 74 - 99 mg/dL        Imaging  No results found.     Psychiatric Risk Assessment  Acute Risk of Harm to Others is Considered: Low  Acute Risk of Harm to Self is Considered: Low    Assessment:  59M history of schizophrenia (bipolar?) and diabetes, who returned to ED from inpatient psych for  medical exam due to worsening alterations in mentation, refusal to take PO, and unstable vital signs. Psychiatry was consulted on 11/6 for psychiatric evaluation. His outpatient medication regimen appears to be benztropine 0.5 mg BID, carbamazepine 400 mg nightly, clozapine 500 mg nightly, propranolol 10 mg TID.     Update 11/20: No overnight events. Patient reports he is feeling positive and denies SI/HI/AVH. On exam, he is blunted and somewhat guarded but overall polite and cooperative. . Does not appear internally stimulated and no delusions of paranoia were elicted. No med changes from psychiatric perspective as of 11/20. Received Haldol Dec 300mg monthly dose last week. Will CTM to monitor for further improvement in psychotic symptoms as haldol reaches steady state-as of 11/20. Placement to an inpatient Psychiatric unit remains main issue for the patient, given he is out of medicare bed days. He may benefit from long term inpatient psychiatric placement and stabilization. However this would require him to be on the unit for some time until placement like this is found.  SNF placement appropriate at this time given patient's improved behavioral control.     Impression:  Psychosis, improving  Catatonia - resolved  Schizophrenia    Recommendations:  Safety/Monitoring:  Patient does not meet criteria for inpatient psychiatric admission  Patient does not require 1:1 observation from a psychiatric perspective, defer to primary team  Daily interdisciplinary safety and planning huddle with Psychiatry  Video monitoring as with all patients on the MPU  Psychiatry will follow patient daily while on the MPU    Medications:  - Discontinue clozapine  - Discontinue scheduled ativan 1mg Q6 IV  - Discontinue ativan 1 mg IM q6h PRN severe agitation  - Continue haldol decanoate 300mg every 28 days, (last dose 11/14/24)  - Continue IV ativan 1 mg Q8h PRN agitation, can add PO/IM if no IV access    Considerations:  - continue bowel  regimen per primary given clozapine discontinuation    Therapies recommended:  art therapy, music therapy, pet therapy, recreational therapy, and will continue to assess for benefit and meaningful participation    Delirium Guidelines  Provide glasses, hearing aids and/or communication boards as needed for impairments  Frequent reorientation, minimize room and staff changes  Open blinds during the day, dark/quiet room at night   Minimal interruptions and daytime naps  Early evaluation and intervention by PT, out of bed as tolerated  Minimize use of restraints   Minimize use of benzodiazepines, anticholinergic medications, and opiates (while ensuring adequate treatment of pain)  Keep Mg>2, K>4 (as able)  Ensure regular bowel and bladder function (as able)    Disposition/Discharge Planning:  [facility] following, appreciate assistance  -As of SW note 11/20: possible dispo to [facility] pending Level 2 eval to be completed 11/20 afternoon.     Multidisciplinary Rounding  Consulting Physician, Fellow, Medical Student, Charge Nurse, Nurse, Pharmacist, Social Work, Care Coordinator, Dietician, Music Therapy, Art Therapy, Recreational Therapy, and     Medication Consent  N/A - Consult Service    Patient seen during morning rounds by fellow and attending, then again by resident in afternoon.     Karrie Dove M.D.  Psychiatry PGY2

## 2024-11-20 NOTE — PROGRESS NOTES
INTERNAL MEDICINE PROGRESS NOTE     BRIEF NARRATIVE      Clayton Parra is a 59 y.o. male on day 14 of admission presenting with Altered mental status, unspecified altered mental status type.    Pt with significant psychiatric history including schizophrenia schizoaffective bipolar, T2DM  HLD, anemia, HTN, who originally presented to the ED from [facility] for refusing his psych medications.  Was deemed to not have capacity and sent to [facility] for psych admission.  Upon arrival at [facility] patient was found to be alert but nonverbal and not oriented to self place or time.  On arrival to the ED he was found to be diaphoretic rigid. Psych consulted, found to be with catatonia and encephalopathy (resolved). Pt is clear for discharge at this point to SNF, he does not need inpatient psych admission. Psych team adjusted his psych meds, plan is to keep with Haldol Dec 300mg q28 days monotherapy.   Dispo: [facility]. SW sent over clinicals to [facility] to start the auth.   Medically clear. Patients insurance is requesting Level II Evaluation be completed prior to issuing new authorization, date pending     ASSESSMENT / PLANS      #Catatonia - (resolved)     #Acute encephalopathy, psychosis v toxic v infectious - (resolved)   #H/o Schizophrenia - now stable on antipsychotic    #Xanthochromic CSF without evidence of SAH   Etiology of a catatonia is most likely secondary to antipsychotic. However differential includes possible NMS v serotonin syndrome (tachycardia, hypertension, rigidity, mixed clonus and hyperreflexia), meningitis/ encephalitis less likely (negative white count, bcx neg, nuchal rigidity noted but   Kernig and Brudzinski signs negative).   CT head on 11/6 negative for intracranial hemorrhage   - S/p Acyclovir, ampicillin, ceftriaxone and vancomycin in the ED for empiric meningitis coverage. Bcx, WBC negative, afebrile, procal neg. No infectious source identified so far, Will not initiate abx at the moment.    - LP 11/8 : Neutrophils 42, protein 83, glucose 151, RBC 3000.   - Psych following.      - Discontinued clozapine     - Discontinued scheduled ativan 1mg Q6 IV     - Discontinued ativan 1 mg IM q6h PRN severe agitation     - Continue haldol decanoate 300mg every 28 days to be given 11/14     - Continue IV ativan 1 mg Q8h PRN agitation, can add PO/IM if no IV access  - Pt currently [place] not meet inpatient psych admission   - Cont fall precaution   - No need for 1:1 sitter  - PT/OT       > T2DM  - Hold home metformin  - Home insulin regimen: Lantus 70 units nightly, Humalog 18 units 3 times daily with meals  - BG fluctuating between 127- 262. For now will continue Lantus 10u nightly , Lispro 18u TID with meals and #1 SSI. Goal -180   - Cont accucheck      > HTN  > HLD  -Will hold home aspirin and atorvastatin and lisinopril for now until patient is able to take p.o.  - Cont IV lopressor 5mg for SBP > 160  DBP > 110      > Anemia  -Hgb stable  -Will hold home iron     Fluids: monitor and replete as needed  Electrolytes: monitor and replete as needed  Nutrition: Regular diet   GI prophylaxis: as needed  DVT prophylaxis: SCD  Code Status: full code    SUBJECTIVE       Pt resting comfortably in bed in NAD. Resting in bed appeared to be at baseline     OBJECTIVE      Visit Vitals  /79   Pulse 69   Temp 36.3 °C (97.3 °F)   Resp 18      No intake or output data in the 24 hours ending 11/20/24 1229    Physical Exam   Constitutional:       Appearance: He is ill-appearing and toxic-appearing.   HENT:      Head: Normocephalic and atraumatic.      Right Ear: Tympanic membrane normal.      Left Ear: Tympanic  membrane normal.   Eyes:      Pupils: Pupils are equal, round, and reactive to light.   Cardiovascular:      Rate and Rhythm: Tachycardia present.   Abdominal:      Palpations: Abdomen is soft.   Musculoskeletal:         General: Normal range of motion.      Cervical back: Rigidity present.   Skin:     Capillary Refill: Capillary refill takes less than 2 seconds.   Neurological:      Mental Status: He is disoriented.     Current Meds   aspirin, 81 mg, oral, Daily  atorvastatin, 40 mg, oral, Nightly  docusate sodium, 100 mg, oral, Daily  enoxaparin, 40 mg, subcutaneous, q24h  ferrous sulfate (325 mg ferrous sulfate), 65 mg of iron, oral, Daily with breakfast  haloperidol decanoate, 300 mg, intramuscular, q28 days  insulin glargine, 20 Units, subcutaneous, Nightly  insulin lispro, 0-10 Units, subcutaneous, q6h  insulin lispro, 18 Units, subcutaneous, TID AC  lisinopril, 20 mg, oral, Daily  [Held by provider] metFORMIN, 1,000 mg, oral, BID  polyethylene glycol, 17 g, oral, Daily  propranolol, 10 mg, oral, TID  thiamine, 100 mg, oral, Daily       PRN medications: dextrose, dextrose, dextrose, dextrose, glucagon, glucagon, glucagon, glucagon, LORazepam, metoprolol     LABS and IMAGING     WBC   Date Value Ref Range Status   11/19/2024 5.7 4.4 - 11.3 x10*3/uL Final     Hemoglobin   Date Value Ref Range Status   11/19/2024 14.0 13.5 - 17.5 g/dL Final     Hematocrit   Date Value Ref Range Status   11/19/2024 39.3 (L) 41.0 - 52.0 % Final     Bicarbonate   Date Value Ref Range Status   11/19/2024 25 21 - 32 mmol/L Final     Creatinine   Date Value Ref Range Status   11/19/2024 0.72 0.50 - 1.30 mg/dL Final     Calcium   Date Value Ref Range Status   11/19/2024 9.1 8.6 - 10.6 mg/dL Final       FL guided lumbar puncture  Narrative: Interpreted By:  Maurisio Rodriguez and Stevens Alex   STUDY:  FL GUIDED LUMBAR PUNCTURE;  11/8/2024 2:47 pm      INDICATION:  Signs/Symptoms:lumbar puncture-- several attempts in the ED.           COMPARISON:  None.      ACCESSION NUMBER(S):  [id number]      ORDERING CLINICIAN:  LUIS CORTÉS      TECHNIQUE:  After discussion of risks, benefits, and alternatives, oral and  written informed consent was obtained. The patient was placed in  prone position on exam table. The L3-4 interspace was then marked  under fluoroscopy. The patient was then prepped and draped in sterile  fashion. Local anesthesia was achieved with 2% Lidocaine solution. A  20 gauge spinal needle was then introduced at the L3-L4 level under  direct fluoroscopic guidance until return of CSF was demonstrated. 10  ml of clear CSF was collected in 4 tubes. The stylet was then  replaced and the spinal needle was removed.  Hemostasis was achieved  with direct pressure and the area was dressed with a Band-Aid. The  patient tolerated procedure well without any immediate or clinically  apparent complications. Total fluoroscopy time was 0.5 MINUTES.      The collected CSF was then sent to the lab for appropriate lab tests  as ordered by the referring physician.      FINDINGS:  A single periprocedural spot fluoroscopic image was provided for  interpretation. Image quality is such that fine bony detail is  obscured. A needle is seen to overlie the posterior elements of L4.      Impression: Successful fluoroscopic guided lumbar puncture.      I personally reviewed the images/study and I agree with the findings  as stated by Endy Boss DO PGY-2. This study was interpreted at  [facility], [place], [place].      MACRO:  None      Signed by: Maurisio Rodriguez 11/11/2024 2:34 PM  Dictation workstation:   [id number]         PROBLEM LISTS      Problem List Items Addressed This Visit          Neuro    * (Principal) Altered mental status, unspecified altered mental status type - Primary    Relevant Medications    haloperidol decanoate (Haldol Decanoate) 100 mg/mL injection (Start on 12/12/2024)    insulin lispro 100  unit/mL injection    insulin lispro 100 unit/mL injection    insulin glargine (Lantus) 100 unit/mL injection    melatonin 3 mg tablet    Other Relevant Orders    Referral to Primary Care - Family Practice       This dictation was created with voice recognition software. Although every effort is made to review the dictation as it is transcribed, on occasion spoken words, phrases, names, numbers, punctuation etc can be misinterpreted by the the technology leading to omissions or inappropriate outcomes.     Geraldine Rojas MD

## 2024-11-20 NOTE — PROGRESS NOTES
Clayton Parra is a 59 y.o. y.o. male on day 14 of admission presenting with Altered mental status, unspecified altered mental status type [R41.82].     Subjective   Received phone call from patients legal guardian/sister Mehnaz ([phone]); she had questions regarding patients Level II Evaluation. Discussed that this needs to be completed prior to patient going to [facility]. Sister reported understanding and confirmed that she would reach out to Traver with \[facility]\"" to give her permission to complete the evaluation. Discussed that  would continue to keep her updated on the progress.    Spoke with Ruby Leroy with \[facility]\""; she confirmed that she was able to speak to the patients sister/legal guardian this morning and she received permission to complete assessment today. She reports that she will be at [facility] around 1:00pm today to complete.     Primary MD and TCC updated.    - Halina QUIJANO MA, hospitals  Care Transitions   [facility] Secure Chat or [phone]

## 2024-11-21 LAB — GLUCOSE BLD MANUAL STRIP-MCNC: 173 MG/DL (ref 74–99)

## 2024-11-21 PROCEDURE — 1100000001 HC PRIVATE ROOM DAILY

## 2024-11-21 PROCEDURE — 2500000001 HC RX 250 WO HCPCS SELF ADMINISTERED DRUGS (ALT 637 FOR MEDICARE OP): Performed by: STUDENT IN AN ORGANIZED HEALTH CARE EDUCATION/TRAINING PROGRAM

## 2024-11-21 PROCEDURE — 82947 ASSAY GLUCOSE BLOOD QUANT: CPT

## 2024-11-21 PROCEDURE — 97530 THERAPEUTIC ACTIVITIES: CPT | Mod: GP

## 2024-11-21 PROCEDURE — 99231 SBSQ HOSP IP/OBS SF/LOW 25: CPT

## 2024-11-21 PROCEDURE — 99232 SBSQ HOSP IP/OBS MODERATE 35: CPT | Performed by: STUDENT IN AN ORGANIZED HEALTH CARE EDUCATION/TRAINING PROGRAM

## 2024-11-21 RX ORDER — DOCUSATE SODIUM 100 MG/1
100 CAPSULE, LIQUID FILLED ORAL 2 TIMES DAILY
Status: CANCELLED | OUTPATIENT
Start: 2024-11-21

## 2024-11-21 RX ORDER — POLYETHYLENE GLYCOL 3350 17 G/17G
17 POWDER, FOR SOLUTION ORAL DAILY
Status: CANCELLED | OUTPATIENT
Start: 2024-11-21

## 2024-11-21 RX ORDER — CLOZAPINE 25 MG/1
25 TABLET ORAL NIGHTLY
Status: CANCELLED | OUTPATIENT
Start: 2024-11-21

## 2024-11-21 ASSESSMENT — COGNITIVE AND FUNCTIONAL STATUS - GENERAL
MOBILITY SCORE: 23
MOBILITY SCORE: 24
CLIMB 3 TO 5 STEPS WITH RAILING: A LITTLE
DAILY ACTIVITIY SCORE: 24

## 2024-11-21 ASSESSMENT — COLUMBIA-SUICIDE SEVERITY RATING SCALE - C-SSRS
1. SINCE LAST CONTACT, HAVE YOU WISHED YOU WERE DEAD OR WISHED YOU COULD GO TO SLEEP AND NOT WAKE UP?: NO
6. HAVE YOU EVER DONE ANYTHING, STARTED TO DO ANYTHING, OR PREPARED TO DO ANYTHING TO END YOUR LIFE?: NO
6. HAVE YOU EVER DONE ANYTHING, STARTED TO DO ANYTHING, OR PREPARED TO DO ANYTHING TO END YOUR LIFE?: NO
1. SINCE LAST CONTACT, HAVE YOU WISHED YOU WERE DEAD OR WISHED YOU COULD GO TO SLEEP AND NOT WAKE UP?: NO
2. HAVE YOU ACTUALLY HAD ANY THOUGHTS OF KILLING YOURSELF?: NO
2. HAVE YOU ACTUALLY HAD ANY THOUGHTS OF KILLING YOURSELF?: NO

## 2024-11-21 ASSESSMENT — PAIN SCALES - GENERAL
PAINLEVEL_OUTOF10: 0 - NO PAIN

## 2024-11-21 ASSESSMENT — PAIN - FUNCTIONAL ASSESSMENT: PAIN_FUNCTIONAL_ASSESSMENT: 0-10

## 2024-11-21 NOTE — CARE PLAN
The patient's goals for the shift include      The clinical goals for the shift include Pt will be free of falls and injury throughout the shift     Over the shift, the patient remained free of falls and injury. Pt continues to refuse meds and accuchecks. Md notified

## 2024-11-21 NOTE — PROGRESS NOTES
"Clayton Parra is a 59 y.o. male on hospital day 15 of admission presenting with Altered mental status, unspecified altered mental status type.    Subjective   Patient was seen at bedside today and gave primarily one to three word answers, which limited interview.     Mr. Parra was seen at bedside today and had no concerns. He feels safe on the unit, denies feelings of paranoia, states his mood is \"good\" and is denying SI/HI/AVH at this time. He is Aox3 to person, place and situation and remembers someone coming by to do an evaluation yesterday afternoon for [facility].    =============================================  Additional info: No PRNs required overnight. Per nursing note: \"Over the shift, the patient did make progress toward the following goals. Pt remained free of falls and injuries. Pt refused his all his scheduled meds during this shift and blood sugar. Pt is educated on the importance of his meds and blood Glucose monitoring, Pt states \" I do not want to take meds nor get my blood glucose checked, get out of my room. Thank you\". Pt continues being noncompliant with care and meds. Slept throughout the night. Safety measures maintained. \"    Objective   -Mild fine resting tremors b/L UE.    Vital Signs:      11/19/2024     9:08 AM 11/19/2024     4:00 PM 11/19/2024    11:52 PM 11/20/2024     7:45 AM 11/20/2024     3:46 PM 11/21/2024    12:00 AM 11/21/2024     7:43 AM   Vitals   Systolic 145 145 163 147 156 148 94   Diastolic 82 84 75 79 100 82 62   Heart Rate 89 76 63 69 65 66 70   Temp 36.8 °C (98.2 °F) 36.5 °C (97.7 °F) 36.7 °C (98.1 °F) 36.3 °C (97.3 °F)  37 °C (98.6 °F) 36.7 °C (98.1 °F)   Resp   18 18 18 18       Intake/Output last 3 Shifts:  No intake/output data recorded.    Mental Status Exam:  General/Appearance: No acute distress, laying in bed  in hospital gown, grooming and hygiene adequate. Bald male, appears younger than stated age.  Attitude/Behavior: engages in interview, " "initially guarded but then open and polite on interview. Calm, appropriate eye contact.  Motor Activity: no psychomotor agitation/retardation, no tics/tremors, no EPS/TD, gait: not assessed  Mood: \"good\"  Affect: Blunted, cooperative, guarded.  Speech: speaks softly, normal rate, speaks in 1-3 word sentence fragments.  Thought Process: Does not appear to be disorganized in this limited interview but often blurted out comments that gave impression of distractability.   Thought Content: unable to assess, Delusional thinking: paranoid per chart review but no delusions elicited today. Patient states he feels safe on the unit.  Thought Perception: denies AVH, does not appear internally stimulated.  Cognition: no overt deficits noted, not formally assessed  Insight: limited  Judgment: limited    Current Medications  Scheduled   aspirin, 81 mg, oral, Daily  atorvastatin, 40 mg, oral, Nightly  docusate sodium, 100 mg, oral, Daily  enoxaparin, 40 mg, subcutaneous, q24h  ferrous sulfate (325 mg ferrous sulfate), 65 mg of iron, oral, Daily with breakfast  haloperidol decanoate, 300 mg, intramuscular, q28 days  insulin glargine, 20 Units, subcutaneous, Nightly  insulin lispro, 0-10 Units, subcutaneous, q6h  insulin lispro, 18 Units, subcutaneous, TID AC  lisinopril, 20 mg, oral, Daily  [Held by provider] metFORMIN, 1,000 mg, oral, BID  polyethylene glycol, 17 g, oral, Daily  propranolol, 10 mg, oral, TID  thiamine, 100 mg, oral, Daily        PRN   PRN medications: dextrose, dextrose, dextrose, dextrose, glucagon, glucagon, glucagon, glucagon, LORazepam, metoprolol     Labs  Results for orders placed or performed during the hospital encounter of 11/06/24 (from the past 24 hours)   POCT GLUCOSE   Result Value Ref Range    POCT Glucose 179 (H) 74 - 99 mg/dL   POCT GLUCOSE   Result Value Ref Range    POCT Glucose 173 (H) 74 - 99 mg/dL        Imaging  No results found.     Psychiatric Risk Assessment  Acute Risk of Harm to Others is " Considered: Low  Acute Risk of Harm to Self is Considered: Low    Assessment:  59M history of schizophrenia (bipolar disorder per chart review) and diabetes, who returned to ED from inpatient psych for medical exam due to worsening alterations in mentation, refusal to take PO, and unstable vital signs. Psychiatry was consulted on 11/6 for psychiatric evaluation. His outpatient medication regimen appears to be benztropine 0.5 mg BID, carbamazepine 400 mg nightly, clozapine 500 mg nightly, propranolol 10 mg TID.     Update 11/21: Patient seen at bedside today, denying complaints or feelings of paranoia/AVH/SI. On exam, he appears linear, logical and coherent but is at times difficult to understand due to speaking softly and speaking primarily in 1-3 word sentence fragments. He does not appear internally stimulated at this time. Psychosis seems to be improving. Placement to an inpatient Psychiatric unit remains main issue for the patient, given he is out of medicare bed days. He may benefit from long term inpatient psychiatric placement and stabilization. However this would require him to be on the unit for some time until placement like this is found.  SNF placement appropriate at this time given patient's improved behavioral control.     Impression:  Psychosis, improving  Catatonia - resolved  Schizophrenia    Recommendations:  Safety/Monitoring:  Patient does not meet criteria for inpatient psychiatric admission  Patient does not require 1:1 observation from a psychiatric perspective, defer to primary team  Daily interdisciplinary safety and planning huddle with Psychiatry  Video monitoring as with all patients on the MPU  Psychiatry will follow patient daily while on the MPU    Medications:  - Discontinue clozapine  - Discontinue scheduled ativan 1mg Q6 IV  - Discontinue ativan 1 mg IM q6h PRN severe agitation  - Continue haldol decanoate 300mg every 28 days, (last dose 11/14/24)  - Continue IV ativan 1 mg Q8h PRN  agitation, can add PO/IM if no IV access    Considerations:  - continue bowel regimen per primary given clozapine discontinuation    Therapies recommended:  art therapy, music therapy, pet therapy, recreational therapy, and will continue to assess for benefit and meaningful participation    Delirium Guidelines  Provide glasses, hearing aids and/or communication boards as needed for impairments  Frequent reorientation, minimize room and staff changes  Open blinds during the day, dark/quiet room at night   Minimal interruptions and daytime naps  Early evaluation and intervention by PT, out of bed as tolerated  Minimize use of restraints   Minimize use of benzodiazepines, anticholinergic medications, and opiates (while ensuring adequate treatment of pain)  Keep Mg>2, K>4 (as able)  Ensure regular bowel and bladder function (as able)    Disposition/Discharge Planning:  SW following, appreciate assistance  -As of SW note 11/20: possible dispo to [facility] pending Level 2 eval to be completed 11/20 afternoon.     Multidisciplinary Rounding  Consulting Physician, Fellow, Medical Student, Charge Nurse, Nurse, Pharmacist, Social Work, Care Coordinator, Dietician, Music Therapy, Art Therapy, Recreational Therapy, and     Medication Consent  N/A - Consult Service      Karrie Dove M.D.  Psychiatry PGY2

## 2024-11-21 NOTE — PROGRESS NOTES
Physical Therapy    Physical Therapy Treatment    Patient Name: Clayton Parra  MRN: [id number]  Department: [place] [id number]  Room: [address] [place] [address]-  Today's Date: 11/21/2024  Time Calculation  Start Time: 0852  Stop Time: 0902  Time Calculation (min): 10 min         Assessment/Plan   PT Assessment  Assessment Comment: Inpatient PT goals mostly met (except for gait distance goal though pt has been up (I) in room with amb for ~1 week at this time).  No further PT needs at this time.  If mobility declines, please reconsult PT.  End of Session Patient Position: Up in chair, Alarm off, not on at start of session     PT Plan  Treatment/Interventions: Gait training, Transfer training, Balance training, Therapeutic exercise, Therapeutic activity  PT Plan: Ongoing PT  PT Frequency: Other (Comment) (discharge PT)  PT Discharge Recommendations: No further acute PT (return to LTC facility/need for (S)  for cognition/safety)  PT Recommended Transfer Status: Independent  PT - OK to Discharge: Yes (POC/goals/discharge rec intensity created)      General Visit Information:   PT  Visit  PT Received On: 11/21/24  General  General Comment: Pt supine in bed, low mumbly speech, though does report that he is doing the minimum activity.  Demos (S) transfers though declined to amb at this time.  Later witnessed pt up in room (I) amb to chair by door.  Anticipate pt is at mobility baseline at this time.  No further acute PT needs identified.    Subjective   Precautions:  Precautions  Medical Precautions: Fall precautions    Objective   Pain:  Pain Assessment  Pain Assessment: 0-10  0-10 (Numeric) Pain Score: 0 - No pain  Cognition:  Cognition  Arousal/Alertness: Appropriate responses to stimuli  Cognition Comments: low voice volume with mumbling responses, incoherent at times        Treatments:  Bed Mobility  Bed Mobility: Yes  Bed Mobility 1  Bed Mobility 1: Supine to sitting, Sitting to supine  Level of Assistance 1: Distant supervision,  Independent    Ambulation/Gait Training  Ambulation/Gait Training Performed: Yes  Ambulation/Gait Training 1  Surface 1: Level tile  Device 1: No device  Assistance 1: Distant supervision, Independent  Quality of Gait 1: Forward flexed posture  Comments/Distance (ft) 1: 10 ft (pt up in room after PT encounter, witnessed pt amb to bedside chair by door)  Transfers  Transfer: Yes  Transfer 1  Technique 1: Stand to sit  Transfer Level of Assistance 1: Distant supervision, Independent    Outcome Measures:  [facility] Basic Mobility  Turning from your back to your side while in a flat bed without using bedrails: None  Moving from lying on your back to sitting on the side of a flat bed without using bedrails: None  Moving to and from bed to chair (including a wheelchair): None  Standing up from a chair using your arms (e.g. wheelchair or bedside chair): None  To walk in hospital room: None  Climbing 3-5 steps with railing: A little  Basic Mobility - Total Score: 23    Education Documentation  Mobility Training, taught by Janki Wick, PT at 11/21/2024 11:07 AM.  Learner: Patient  Readiness: Acceptance  Method: Explanation  Response: Verbalizes Understanding  Comment: ? understanding of safety precautions with mobility, though pt has not required physical assist for mobility and typically is up (I) in room     Encounter Problems       Encounter Problems (Resolved)       Balance       Patient will maintain balance to allow for safe mobility with (S)-> (I). (Met)       Start:  11/08/24    Expected End:  11/22/24    Resolved:  11/21/24            Mobility       STG - Patient will ambulate with (S)-> (I), 100 ft x2. (Met)       Start:  11/08/24    Expected End:  11/22/24    Resolved:  11/21/24            PT Transfers       STG - Patient will perform bed mobility (S)-> (I).  (Met)       Start:  11/08/24    Expected End:  11/22/24    Resolved:  11/21/24         STG - Patient will transfer sit to and from stand (S)-> (I).  (Met)        Start:  11/08/24    Expected End:  11/22/24    Resolved:  11/12/24 11/21/24 at 11:09 AM - Janki Wick, PT

## 2024-11-21 NOTE — PROGRESS NOTES
Clayton Parra is a 59 y.o. y.o. male on day 15 of admission presenting with Altered mental status, unspecified altered mental status type [R41.82].     Subjective   Patient remains medically ready for discharge and no longer meets criteria for inpatient psychiatry. Patient is pending authorization to discharge to [facility] & Level II Evaluation with the Board of Mental Health was completed yesterday.    SW will continue to follow.    - Halina QUIJANO, MA, LSW  Care Transitions   [facility] Secure Chat or [phone]

## 2024-11-21 NOTE — PROGRESS NOTES
.                                                                                                                                                                                                                                                                                             INTERNAL MEDICINE PROGRESS NOTE     BRIEF NARRATIVE      Clayton Parra is a 59 y.o. male on day 15 of admission presenting with Altered mental status, unspecified altered mental status type.    Pt with significant psychiatric history including schizophrenia schizoaffective bipolar, T2DM  HLD, anemia, HTN, who originally presented to the ED from [facility] for refusing his psych medications.  Was deemed to not have capacity and sent to [facility] for psych admission.  Upon arrival at [facility] patient was found to be alert but nonverbal and not oriented to self place or time.  On arrival to the ED he was found to be diaphoretic rigid. Psych consulted, found to be with catatonia and encephalopathy (resolved). Pt is clear for discharge at this point to SNF, he does not need inpatient psych admission. Psych team adjusted his psych meds, plan is to keep with Haldol Dec 300mg q28 days monotherapy.   Dispo: [facility]. SW sent over clinicals to [facility] to start the auth.   Medically clear. Patients insurance is requesting Level II Evaluation be completed prior to issuing new authorization, date pending     ASSESSMENT / PLANS      #Catatonia - (resolved)     #Acute encephalopathy, psychosis v toxic v infectious - (resolved)   #H/o Schizophrenia - now stable on antipsychotic    #Xanthochromic CSF without evidence of SAH   Etiology of a catatonia is most likely secondary to antipsychotic. However differential includes possible NMS v serotonin syndrome (tachycardia, hypertension, rigidity, mixed clonus and hyperreflexia), meningitis/ encephalitis less likely (negative white count, bcx neg, nuchal rigidity noted but   Kernig and Brudzinski signs negative).   CT head on 11/6 negative for intracranial hemorrhage   - S/p Acyclovir, ampicillin, ceftriaxone and vancomycin in the ED for empiric meningitis coverage. Bcx, WBC negative, afebrile, procal neg. No infectious source identified so far, Will not initiate abx at the moment.    - LP 11/8 : Neutrophils 42, protein 83, glucose 151, RBC 3000.   - Psych following.      - Discontinued clozapine     - Discontinued scheduled ativan 1mg Q6 IV     - Discontinued ativan 1 mg IM q6h PRN severe agitation     - Continue haldol decanoate 300mg every 28 days to be given 11/14     - Continue IV ativan 1 mg Q8h PRN agitation, can add PO/IM if no IV access  - Pt currently lake not meet inpatient psych admission   - Cont fall precaution   - No need for 1:1 sitter  - PT/OT   - Discharge pending placement      > T2DM  - Hold home metformin  - Home insulin regimen: Lantus 70 units nightly, Humalog 18 units 3 times daily with meals  - BG fluctuating between 127- 262. For now will continue Lantus 10u nightly , Lispro 18u TID with meals and #1 SSI. Goal -180   - Cont accucheck      > HTN  > HLD  -Will hold home aspirin and atorvastatin and lisinopril for now until patient is able to take p.o.  - Cont IV lopressor 5mg for SBP > 160  DBP > 110      > Anemia  -Hgb stable  -Will hold home iron     Fluids: monitor and replete as needed  Electrolytes: monitor and replete as needed  Nutrition: Regular diet   GI prophylaxis: as needed  DVT prophylaxis: SCD  Code Status: full code    SUBJECTIVE       Pt resting comfortably in bed in NAD. Resting in bed appeared to be at baseline     OBJECTIVE      Visit Vitals  BP 94/62   Pulse 70   Temp 36.7 °C (98.1 °F)   Resp 18      No intake or output data in the 24 hours ending 11/21/24 1516    Physical Exam   Constitutional:       Appearance: He is ill-appearing and toxic-appearing.   HENT:      Head: Normocephalic and atraumatic.      Right Ear: Tympanic membrane  normal.      Left Ear: Tympanic membrane normal.   Eyes:      Pupils: Pupils are equal, round, and reactive to light.   Cardiovascular:      Rate and Rhythm: Tachycardia present.   Abdominal:      Palpations: Abdomen is soft.   Musculoskeletal:         General: Normal range of motion.      Cervical back: Rigidity present.   Skin:     Capillary Refill: Capillary refill takes less than 2 seconds.   Neurological:      Mental Status: He is disoriented.     Current Meds   aspirin, 81 mg, oral, Daily  atorvastatin, 40 mg, oral, Nightly  docusate sodium, 100 mg, oral, Daily  enoxaparin, 40 mg, subcutaneous, q24h  ferrous sulfate (325 mg ferrous sulfate), 65 mg of iron, oral, Daily with breakfast  haloperidol decanoate, 300 mg, intramuscular, q28 days  insulin glargine, 20 Units, subcutaneous, Nightly  insulin lispro, 0-10 Units, subcutaneous, q6h  insulin lispro, 18 Units, subcutaneous, TID AC  lisinopril, 20 mg, oral, Daily  [Held by provider] metFORMIN, 1,000 mg, oral, BID  polyethylene glycol, 17 g, oral, Daily  propranolol, 10 mg, oral, TID  thiamine, 100 mg, oral, Daily       PRN medications: dextrose, dextrose, dextrose, dextrose, glucagon, glucagon, glucagon, glucagon, LORazepam, metoprolol     LABS and IMAGING     WBC   Date Value Ref Range Status   11/19/2024 5.7 4.4 - 11.3 x10*3/uL Final     Hemoglobin   Date Value Ref Range Status   11/19/2024 14.0 13.5 - 17.5 g/dL Final     Hematocrit   Date Value Ref Range Status   11/19/2024 39.3 (L) 41.0 - 52.0 % Final     Bicarbonate   Date Value Ref Range Status   11/19/2024 25 21 - 32 mmol/L Final     Creatinine   Date Value Ref Range Status   11/19/2024 0.72 0.50 - 1.30 mg/dL Final     Calcium   Date Value Ref Range Status   11/19/2024 9.1 8.6 - 10.6 mg/dL Final       FL guided lumbar puncture  Narrative: Interpreted By:  Maurisio Rodriguez and Stevens Alex   STUDY:  FL GUIDED LUMBAR PUNCTURE;  11/8/2024 2:47 pm      INDICATION:  Signs/Symptoms:lumbar puncture-- several  attempts in the ED.          COMPARISON:  None.      ACCESSION NUMBER(S):  [id number]      ORDERING CLINICIAN:  LUIS CORTÉS      TECHNIQUE:  After discussion of risks, benefits, and alternatives, oral and  written informed consent was obtained. The patient was placed in  prone position on exam table. The L3-4 interspace was then marked  under fluoroscopy. The patient was then prepped and draped in sterile  fashion. Local anesthesia was achieved with 2% Lidocaine solution. A  20 gauge spinal needle was then introduced at the L3-L4 level under  direct fluoroscopic guidance until return of CSF was demonstrated. 10  ml of clear CSF was collected in 4 tubes. The stylet was then  replaced and the spinal needle was removed.  Hemostasis was achieved  with direct pressure and the area was dressed with a Band-Aid. The  patient tolerated procedure well without any immediate or clinically  apparent complications. Total fluoroscopy time was 0.5 MINUTES.      The collected CSF was then sent to the lab for appropriate lab tests  as ordered by the referring physician.      FINDINGS:  A single periprocedural spot fluoroscopic image was provided for  interpretation. Image quality is such that fine bony detail is  obscured. A needle is seen to overlie the posterior elements of L4.      Impression: Successful fluoroscopic guided lumbar puncture.      I personally reviewed the images/study and I agree with the findings  as stated by Endy Boss DO PGY-2. This study was interpreted at  [place], [place].      MACRO:  None      Signed by: Maurisio Rodriguez 11/11/2024 2:34 PM  Dictation workstation:   [id number]         PROBLEM LISTS      Problem List Items Addressed This Visit          Neuro    * (Principal) Altered mental status, unspecified altered mental status type - Primary    Relevant Medications    haloperidol decanoate (Haldol Decanoate) 100 mg/mL injection (Start on 12/12/2024)     insulin lispro 100 unit/mL injection    insulin lispro 100 unit/mL injection    insulin glargine (Lantus) 100 unit/mL injection    melatonin 3 mg tablet    Other Relevant Orders    Referral to Primary Care - Family Practice       This dictation was created with voice recognition software. Although every effort is made to review the dictation as it is transcribed, on occasion spoken words, phrases, names, numbers, punctuation etc can be misinterpreted by the the technology leading to omissions or inappropriate outcomes.     Geraldine Rojas MD

## 2024-11-21 NOTE — CARE PLAN
"  Problem: Fall/Injury  Goal: Verbalize understanding of personal risk factors for fall in the hospital  Outcome: Progressing     Problem: Fall/Injury  Goal: Verbalize understanding of risk factor reduction measures to prevent injury from fall in the home  Outcome: Progressing      The patient's goals for the shift include      The clinical goals for the shift include Pt will remain free of falls/injuries durin this shift.    Over the shift, the patient did make progress toward the following goals. Pt remained free of falls and injuries. Pt refused his all his scheduled meds during this shift and blood sugar. Pt is educated on the importance of his meds and blood Glucose monitoring, Pt states \" I do not want to take meds nor get my blood glucose checked, get out of my room. Thank you\". Pt continues being noncompliant with care and meds. Slept throughout the night. Safety measures maintained.    "

## 2024-11-22 VITALS
SYSTOLIC BLOOD PRESSURE: 149 MMHG | HEIGHT: 68 IN | RESPIRATION RATE: 17 BRPM | TEMPERATURE: 97.7 F | DIASTOLIC BLOOD PRESSURE: 68 MMHG | OXYGEN SATURATION: 97 % | WEIGHT: 220 LBS | BODY MASS INDEX: 33.34 KG/M2 | HEART RATE: 68 BPM

## 2024-11-22 PROCEDURE — 99232 SBSQ HOSP IP/OBS MODERATE 35: CPT | Performed by: INTERNAL MEDICINE

## 2024-11-22 PROCEDURE — 99231 SBSQ HOSP IP/OBS SF/LOW 25: CPT

## 2024-11-22 PROCEDURE — 1100000001 HC PRIVATE ROOM DAILY

## 2024-11-22 RX ORDER — QUETIAPINE FUMARATE 25 MG/1
25 TABLET, FILM COATED ORAL 2 TIMES DAILY PRN
Status: DISCONTINUED | OUTPATIENT
Start: 2024-11-22 | End: 2024-11-26 | Stop reason: HOSPADM

## 2024-11-22 ASSESSMENT — COLUMBIA-SUICIDE SEVERITY RATING SCALE - C-SSRS
6. HAVE YOU EVER DONE ANYTHING, STARTED TO DO ANYTHING, OR PREPARED TO DO ANYTHING TO END YOUR LIFE?: NO
2. HAVE YOU ACTUALLY HAD ANY THOUGHTS OF KILLING YOURSELF?: NO
1. SINCE LAST CONTACT, HAVE YOU WISHED YOU WERE DEAD OR WISHED YOU COULD GO TO SLEEP AND NOT WAKE UP?: NO

## 2024-11-22 ASSESSMENT — PAIN SCALES - GENERAL
PAINLEVEL_OUTOF10: 0 - NO PAIN
PAINLEVEL_OUTOF10: 0 - NO PAIN

## 2024-11-22 ASSESSMENT — PAIN - FUNCTIONAL ASSESSMENT: PAIN_FUNCTIONAL_ASSESSMENT: 0-10

## 2024-11-22 NOTE — PROGRESS NOTES
Clayton Parra is a 59 y.o. y.o. male on day 16 of admission presenting with Altered mental status, unspecified altered mental status type [R41.82].     Subjective   Patient remains medically ready for discharge and no longer meets criteria for inpatient psychiatry. Email sent to [email or web address] to follow up on the status of Level II Evaluation that was completed on 11/20- notified that it has been queued for review and should be completed in 1-4 days. Updated provided to [facility].    Call placed to patients legal guardian/sister Mehnaz ([phone]) and provided update on above; she denied any further questions or concerns.    - Halina QUIJANO MA, LSW  Care Transitions   [facility] Secure Chat or [phone]

## 2024-11-22 NOTE — NURSING NOTE
Pt is uncooperative at this time refusing blood sugar checks and insulin coverage. Re-education given pt does not verbalize understanding. Pt care is ongoing.

## 2024-11-22 NOTE — PROGRESS NOTES
"Clayton Parra is a 59 y.o. male on day 16 of admission presenting with Altered mental status, unspecified altered mental status type.      Subjective   Patient was seen and examined bedside this morning he was in bed, but conversant, although minimally verbal, and on review of systems responded \"I am good\"       Objective     Last Recorded Vitals  /68   Pulse 68   Temp 36.5 °C (97.7 °F)   Resp 17   Wt 99.8 kg (220 lb)   SpO2 97%   Intake/Output last 3 Shifts:  No intake or output data in the 24 hours ending 11/22/24 1721    Admission Weight  Weight: 99.8 kg (220 lb) (11/06/24 0305)    Daily Weight  11/06/24 : 99.8 kg (220 lb)    Image Results  FL guided lumbar puncture  Narrative: Interpreted By:  Maurisio Rodriguez and Stevens Alex   STUDY:  FL GUIDED LUMBAR PUNCTURE;  11/8/2024 2:47 pm      INDICATION:  Signs/Symptoms:lumbar puncture-- several attempts in the ED.          COMPARISON:  None.      ACCESSION NUMBER(S):  [id number]      ORDERING CLINICIAN:  LUIS CORTÉS      TECHNIQUE:  After discussion of risks, benefits, and alternatives, oral and  written informed consent was obtained. The patient was placed in  prone position on exam table. The L3-4 interspace was then marked  under fluoroscopy. The patient was then prepped and draped in sterile  fashion. Local anesthesia was achieved with 2% Lidocaine solution. A  20 gauge spinal needle was then introduced at the L3-L4 level under  direct fluoroscopic guidance until return of CSF was demonstrated. 10  ml of clear CSF was collected in 4 tubes. The stylet was then  replaced and the spinal needle was removed.  Hemostasis was achieved  with direct pressure and the area was dressed with a Band-Aid. The  patient tolerated procedure well without any immediate or clinically  apparent complications. Total fluoroscopy time was 0.5 MINUTES.      The collected CSF was then sent to the lab for appropriate lab tests  as ordered by the referring physician.   "    FINDINGS:  A single periprocedural spot fluoroscopic image was provided for  interpretation. Image quality is such that fine bony detail is  obscured. A needle is seen to overlie the posterior elements of L4.      Impression: Successful fluoroscopic guided lumbar puncture.      I personally reviewed the images/study and I agree with the findings  as stated by Endy Boss DO PGY-2. This study was interpreted at  [facility], [place], [place].      MACRO:  None      Signed by: Maurisio Rodriguez 11/11/2024 2:34 PM  Dictation workstation:   [id number]      Physical Exam  Constitutional: Minimally verbal gentleman, alert active, cooperative not in acute distress  Eyes: PERRLA, clear sclera  ENMT: Not examined  Head / Neck: Atraumatic, normocephalic, supple neck, JVP not visualized  Lungs: Declined auscultation  Heart: Declined auscultation, palpable pulses in all extremities  GI: Declined auscultation and percussion  MSK: No range of motion performed  Extremities: Intact x 4, no peripheral edema  : No Kenney catheter inserted  Breast: Deferred  Neurological: AAO x 3 to person, place and date, facial muscles symmetrical, sensation intact, strength 4/4, no acute focal neurological deficits appreciated  Psychological: Appropriate mood and behavior    Relevant Results           Scheduled medications  aspirin, 81 mg, oral, Daily  atorvastatin, 40 mg, oral, Nightly  docusate sodium, 100 mg, oral, Daily  enoxaparin, 40 mg, subcutaneous, q24h  ferrous sulfate (325 mg ferrous sulfate), 65 mg of iron, oral, Daily with breakfast  haloperidol decanoate, 300 mg, intramuscular, q28 days  insulin glargine, 20 Units, subcutaneous, Nightly  insulin lispro, 0-10 Units, subcutaneous, q6h  insulin lispro, 18 Units, subcutaneous, TID AC  lisinopril, 20 mg, oral, Daily  [Held by provider] metFORMIN, 1,000 mg, oral, BID  polyethylene glycol, 17 g, oral, Daily  propranolol, 10 mg, oral, TID  thiamine, 100  mg, oral, Daily      Continuous medications     PRN medications  PRN medications: dextrose, dextrose, dextrose, dextrose, glucagon, glucagon, glucagon, glucagon, LORazepam, metoprolol      Assessment/Plan      59-year-old male with PMHx: T2DM, schizophrenia, HLD, anemia, HTN, who originally presented to the ED a couple days ago from [facility] for refusing his psych medications.  Was deemed to not have capacity and sent to [facility] for psych admission.  Upon arrival at [facility] patient was found to be alert but nonverbal and not oriented to self place or time.    On arrival to the ED he was found to be diaphoretic rigid. Psych consulted, found to be with catatonia and encephalopathy (resolved). Pt is clear for discharge at this point to SNF, he does not need inpatient psych admission. Psych team adjusted his psych meds, plan is to keep with Haldol Dec 300mg q28 days monotherapy.   Dispo: [facility]. SW sent over clinicals to [facility] to start the auth.   Medically clear. Patients insurance is requesting Level II Evaluation be completed prior to issuing new authorization, date pending     Acute encephalopathy: Metabolic versus toxic versus psychogenic  -Most recent labs reviewed  -Presented with catatonia  -Encephalopathy and catatonia now resolved, thought to be from antipsychotic regimen.  Other etiology: Serotonin syndrome given tachycardia, hypertension, hyperreflexia.  Possible meningitis or encephalitis  -CT head on 11/6 negative for intracranial hemorrhage   - S/p Acyclovir, ampicillin, ceftriaxone and vancomycin in the ED for empiric meningitis coverage. Bcx, WBC negative, afebrile, procal neg. No infectious source identified so far, Will not initiate abx at the moment.    - LP 11/8 : Neutrophils 42, protein 83, glucose 151, RBC 3000.   - Psych following.      - Discontinued clozapine     - Discontinued scheduled ativan 1mg Q6 IV     - Discontinued ativan 1 mg IM q6h PRN severe  agitation     - Continue haldol decanoate 300mg every 28 days to be given 11/14     - Continue IV ativan 1 mg Q8h PRN agitation, can add PO/IM if no IV access  - Pt currently lake not meet inpatient psych admission   -No as needed's required overnight  - Cont fall precaution   - No need for 1:1 sitter  - PT/OT: Seen evaluated for gait training transfer training balance training therapeutic exercise therapeutic activity, no further acute PT recommended.     Insulin-dependent type 2 diabetes  -POC trend reviewed to ensure glycemic control  -Metformin 1000 mg twice daily on hold  -Continue Lantus 20 mg at bedtime  -Lispro insulin sliding scale in place, mealtime 18 unit 3 times daily    Hypertension  -Lisinopril 20 mg daily  -Propranolol 10 mg 3 times daily    Hyperlipidemia  -Atorvastatin 40 mg daily, aspirin 81 mg daily    Diet  -Diabetic    DVT prophylaxis  -Lovenox 40 mg subcu daily    Disposition: Presenting with acute encephalopathy likely due to antipsychotic, needed further management, now medically cleared, discharge pending placement    Juan Ramon Wallace DO

## 2024-11-22 NOTE — CARE PLAN
The patient's goals for the shift include  BILL    The clinical goals for the shift include Pt will remain complaint with medication administration throughout shift    Throughout shift Pt was calm but uncooperative with care. Pt refused medications throughout shift and attempted to not allow RN to assess them. When asked a short while later Pt did allow RN to perform assessment. Bed locked and low, call light within reach, video monitoring ongoing.       Problem: Fall/Injury  Goal: Verbalize understanding of personal risk factors for fall in the hospital  Outcome: Progressing  Goal: Verbalize understanding of risk factor reduction measures to prevent injury from fall in the home  Outcome: Progressing  Goal: Use assistive devices by end of the shift  Outcome: Progressing  Goal: Pace activities to prevent fatigue by end of the shift  Outcome: Progressing     Problem: Skin  Goal: Decreased wound size/increased tissue granulation at next dressing change  Outcome: Progressing  Goal: Participates in plan/prevention/treatment measures  Outcome: Progressing  Goal: Prevent/manage excess moisture  Outcome: Progressing  Goal: Prevent/minimize sheer/friction injuries  Outcome: Progressing  Goal: Promote/optimize nutrition  Outcome: Progressing  Goal: Promote skin healing  Outcome: Progressing

## 2024-11-22 NOTE — PROGRESS NOTES
"Clayton Parra is a 59 y.o. male on hospital day 16 of admission presenting with Altered mental status, unspecified altered mental status type.    Subjective   Patient was seen at bedside today and gave primarily one to three word answers, which limited interview.     Mr. Parra was seen at bedside today, sitting up in chair and staring pleasantly straight ahead. He reports positive mood, denies trouble sleeping/SI/AVH or confusion. He is aware that he is in the hospital and says that the other doctors said \"everything is fine.\" He denies any other complaints.  =============================================  Additional info: No PRNs required overnight. Nursing notes reviewed. Per chart review and SW notes, patient still pending placement to [facility].    Objective   -Mild fine resting tremors b/L UE.    Vital Signs:      11/19/2024    11:52 PM 11/20/2024     7:45 AM 11/20/2024     3:46 PM 11/21/2024    12:00 AM 11/21/2024     7:43 AM 11/21/2024     5:46 PM 11/22/2024     8:12 AM   Vitals   Systolic 163 147 156 148 94 148 149   Diastolic 75 79 100 82 62 81 68   Heart Rate 63 69 65 66 70 69 68   Temp 36.7 °C (98.1 °F) 36.3 °C (97.3 °F)  37 °C (98.6 °F) 36.7 °C (98.1 °F) 36.6 °C (97.9 °F) 36.5 °C (97.7 °F)   Resp 18 18 18 18  16 17      Intake/Output last 3 Shifts:  No intake/output data recorded.    Mental Status Exam:  General/Appearance: No acute distress, sitting in chair, staring straight ahead, grooming and hygiene adequate. Bald male, appears younger than stated age.  Attitude/Behavior: engages in interview, initially guarded but then open and polite on interview. Calm, appropriate eye contact.  Motor Activity: gait not assessed, mild b/l fine tremors in UE.   Mood: \"positive\"  Affect: Blunted, cooperative, guarded.  Speech: speaks softly, normal rate, speaks in 1-3 word sentence fragments.  Thought Process: Does not appear to be disorganized in this limited interview but often blurted out comments " that gave impression of distractability.   Thought Content: unable to assess, Delusional thinking: paranoid per chart review but no delusions elicited today. Patient states he feels safe on the unit.  Thought Perception: denies AVH, does not appear internally stimulated.  Cognition: no overt deficits noted, not formally assessed  Insight: limited  Judgment: limited    Current Medications  Scheduled   aspirin, 81 mg, oral, Daily  atorvastatin, 40 mg, oral, Nightly  docusate sodium, 100 mg, oral, Daily  enoxaparin, 40 mg, subcutaneous, q24h  ferrous sulfate (325 mg ferrous sulfate), 65 mg of iron, oral, Daily with breakfast  haloperidol decanoate, 300 mg, intramuscular, q28 days  insulin glargine, 20 Units, subcutaneous, Nightly  insulin lispro, 0-10 Units, subcutaneous, q6h  insulin lispro, 18 Units, subcutaneous, TID AC  lisinopril, 20 mg, oral, Daily  [Held by provider] metFORMIN, 1,000 mg, oral, BID  polyethylene glycol, 17 g, oral, Daily  propranolol, 10 mg, oral, TID  thiamine, 100 mg, oral, Daily        PRN   PRN medications: dextrose, dextrose, dextrose, dextrose, glucagon, glucagon, glucagon, glucagon, LORazepam, metoprolol     Labs  No results found for this or any previous visit (from the past 24 hours).       Imaging  No results found.     Psychiatric Risk Assessment  Acute Risk of Harm to Others is Considered: Low  Acute Risk of Harm to Self is Considered: Low    Assessment:  59M history of schizophrenia (bipolar disorder per chart review) and diabetes, who returned to ED from inpatient psych for medical exam due to worsening alterations in mentation, refusal to take PO, and unstable vital signs. Psychiatry was consulted on 11/6 for psychiatric evaluation. His outpatient medication regimen appears to be benztropine 0.5 mg BID, carbamazepine 400 mg nightly, clozapine 500 mg nightly, propranolol 10 mg TID.     Update 11/22:  Patient seen at bedside today, he continues to deny complaints and feelings of  paranoia/AVH/SI. On exam, he appears linear, logical and coherent but is at times difficult to understand due to speaking softly and speaking primarily in 1-3 word sentence fragments. He does not appear internally stimulated at this time. Psychosis improving. Placement to an inpatient Psychiatric unit remains main issue for the patient, given he is out of medicare bed days. However, patient is currently pending placement at [facility] as of 11/22.    Impression:  Psychosis, improving  Catatonia - resolved  Schizophrenia    Recommendations:  Safety/Monitoring:  Patient does not meet criteria for inpatient psychiatric admission  Patient does not require 1:1 observation from a psychiatric perspective, defer to primary team  Daily interdisciplinary safety and planning huddle with Psychiatry  Video monitoring as with all patients on the MPU  Psychiatry will follow patient daily while on the MPU    Medications:  - Continue haldol decanoate 300mg every 28 days, (last dose 11/14/24)  - Continue IV ativan 1 mg Q8h PRN agitation, can add PO/IM if no IV access    Considerations:  - continue bowel regimen per primary given clozapine discontinuation    Therapies recommended:  art therapy, music therapy, pet therapy, recreational therapy, and will continue to assess for benefit and meaningful participation    Delirium Guidelines  Provide glasses, hearing aids and/or communication boards as needed for impairments  Frequent reorientation, minimize room and staff changes  Open blinds during the day, dark/quiet room at night   Minimal interruptions and daytime naps  Early evaluation and intervention by PT, out of bed as tolerated  Minimize use of restraints   Minimize use of benzodiazepines, anticholinergic medications, and opiates (while ensuring adequate treatment of pain)  Keep Mg>2, K>4 (as able)  Ensure regular bowel and bladder function (as able)    Disposition/Discharge Planning:  SW following, appreciate assistance  -As  of SW note 11/22, patient pending placement to [facility].    Multidisciplinary Rounding  Consulting Physician, Fellow, Medical Student, Charge Nurse, Nurse, Pharmacist, Social Work, Care Coordinator, Dietician, Music Therapy, Art Therapy, Recreational Therapy, and     Medication Consent  N/A - Consult Service    This patient was discussed and staffed with fellow and attending psychiatrist.     Karrie Dove M.D.  Psychiatry PGY2

## 2024-11-22 NOTE — CARE PLAN
Problem: Fall/Injury  Goal: Verbalize understanding of personal risk factors for fall in the hospital  Outcome: Progressing  Goal: Verbalize understanding of risk factor reduction measures to prevent injury from fall in the home  Outcome: Progressing  Goal: Use assistive devices by end of the shift  Outcome: Progressing  Goal: Pace activities to prevent fatigue by end of the shift  Outcome: Progressing     Problem: Skin  Goal: Decreased wound size/increased tissue granulation at next dressing change  Outcome: Progressing  Goal: Participates in plan/prevention/treatment measures  Outcome: Progressing  Goal: Prevent/manage excess moisture  Outcome: Progressing  Goal: Prevent/minimize sheer/friction injuries  Outcome: Progressing  Goal: Promote/optimize nutrition  Outcome: Progressing  Goal: Promote skin healing  Outcome: Progressing   The patient's goals for the shift include      The clinical goals for the shift include Patient will remain safe and comfortable without injuries/falls during this shift of [id number].    Patient continue to refuse medications and care. Patient now refusing food and drink. Patient deemedsafe and comfortable. No PRN medicaions admeinstered

## 2024-11-23 LAB — GLUCOSE BLD MANUAL STRIP-MCNC: 184 MG/DL (ref 74–99)

## 2024-11-23 PROCEDURE — 99232 SBSQ HOSP IP/OBS MODERATE 35: CPT | Performed by: INTERNAL MEDICINE

## 2024-11-23 PROCEDURE — 1100000001 HC PRIVATE ROOM DAILY

## 2024-11-23 PROCEDURE — 82947 ASSAY GLUCOSE BLOOD QUANT: CPT

## 2024-11-23 PROCEDURE — 99233 SBSQ HOSP IP/OBS HIGH 50: CPT | Performed by: PSYCHIATRY & NEUROLOGY

## 2024-11-23 ASSESSMENT — COGNITIVE AND FUNCTIONAL STATUS - GENERAL: MOBILITY SCORE: 24

## 2024-11-23 ASSESSMENT — COLUMBIA-SUICIDE SEVERITY RATING SCALE - C-SSRS
6. HAVE YOU EVER DONE ANYTHING, STARTED TO DO ANYTHING, OR PREPARED TO DO ANYTHING TO END YOUR LIFE?: NO
1. SINCE LAST CONTACT, HAVE YOU WISHED YOU WERE DEAD OR WISHED YOU COULD GO TO SLEEP AND NOT WAKE UP?: NO
2. HAVE YOU ACTUALLY HAD ANY THOUGHTS OF KILLING YOURSELF?: NO

## 2024-11-23 ASSESSMENT — PAIN SCALES - PAIN ASSESSMENT IN ADVANCED DEMENTIA (PAINAD)
CONSOLABILITY: NO NEED TO CONSOLE
TOTALSCORE: 0
FACIALEXPRESSION: SMILING OR INEXPRESSIVE
BREATHING: NORMAL
BODYLANGUAGE: RELAXED

## 2024-11-23 ASSESSMENT — PAIN - FUNCTIONAL ASSESSMENT: PAIN_FUNCTIONAL_ASSESSMENT: 0-10

## 2024-11-23 ASSESSMENT — PAIN SCALES - GENERAL: PAINLEVEL_OUTOF10: 0 - NO PAIN

## 2024-11-23 NOTE — CARE PLAN
Problem: Fall/Injury  Goal: Verbalize understanding of personal risk factors for fall in the hospital  Outcome: Progressing  Goal: Verbalize understanding of risk factor reduction measures to prevent injury from fall in the home  Outcome: Progressing  Goal: Use assistive devices by end of the shift  Outcome: Progressing

## 2024-11-23 NOTE — CARE PLAN
The patient's goals for the shift include      The clinical goals for the shift include Pt will remain free of falls and injury     Over the shift, the patient remained free of falls and injury. Pt continues to refuse all meds, accuchecks, and insulin. Md aware. Pt in bed call light within reach.

## 2024-11-23 NOTE — CARE PLAN
Problem: Diabetes  Goal: Achieve decreasing blood glucose levels by end of shift  Outcome: Progressing  Goal: Increase stability of blood glucose readings by end of shift  Outcome: Progressing  Flowsheets (Taken 11/23/2024 2988)  Increase stability of blood glucose readings by end of shift: Med administration/monitoring of effect  Goal: Maintain electrolyte levels within acceptable range throughout shift  Outcome: Progressing  Goal: Maintain glucose levels >70mg/dl to <250mg/dl throughout shift  Outcome: Progressing  Goal: No changes in neurological exam by end of shift  Outcome: Progressing  Goal: Learn about and adhere to nutrition recommendations by end of shift  Outcome: Progressing  Goal: Vital signs within normal range for age by end of shift  Outcome: Progressing  Goal: Increase self care and/or family involovement by end of shift  Outcome: Progressing

## 2024-11-23 NOTE — CONSULTS
"Clayton Parra is a 59 y.o. male on day 17 of admission presenting with Altered mental status, unspecified altered mental status type.      Subjective   Attempted to interview patient in room, medical record was reviewed.  Patient remains a poor historian, speaking on muffled tones in short phrases. He does indicate that he is \"doing fine\" and has no complaints.  Indicates he slept well.  Denies depression, denies SI or HI.  Agreeable to placement.  Does not report med side effects.       Objective     Last Recorded Vitals  Blood pressure 149/68, pulse 68, temperature 36.5 °C (97.7 °F), resp. rate 17, height 1.727 m (5' 8\"), weight 99.8 kg (220 lb), SpO2 97%.    Review of Systems    Psychiatric ROS - Adult  Anxiety: Negative  Depression: negative  Delirium: negative  Psychosis: negative  Bhavya: negative  Safety Issues: none  Psychiatric ROS Comment: poor historian        Mental Status Exam  Mental Status Examination  General Appearance: Fairly groomed.  Gait/Station:  on rney  Speech: Barely audible speech, normal rate, flattened prosody  Mood: euthymic  Affect: Flat  Thought Process: Perseverative, Paucity of content, [place]  Thought Associations: No loosening of associations  Thought Content: normal  Perception: No perceptual abnormalities noted  Level of Consciousness: Alert  Orientation: Alert and oriented to person, place, time and situation  Attention and Concentration: Mild impairment in attention  Recent Memory: Unable to assess   Remote Memory: Unable to assess       Psychiatric Risk Assessment  Violence Risk Assessment: acces to weapons, major mental illness, male, and unemployment  Acute Risk of Harm to Others is Considered: low   Suicide Risk Assessment: access to weapons, chronic medical illness, current psychiatric illness, living alone or lack of social support, male, and unmarried  Protective Factors against Suicide: adherence to  treatment  Acute Risk of Harm to Self is Considered: " low    Relevant Results    Scheduled medications  aspirin, 81 mg, oral, Daily  atorvastatin, 40 mg, oral, Nightly  docusate sodium, 100 mg, oral, Daily  enoxaparin, 40 mg, subcutaneous, q24h  ferrous sulfate (325 mg ferrous sulfate), 65 mg of iron, oral, Daily with breakfast  haloperidol decanoate, 300 mg, intramuscular, q28 days  insulin glargine, 20 Units, subcutaneous, Nightly  insulin lispro, 0-10 Units, subcutaneous, q6h  insulin lispro, 18 Units, subcutaneous, TID AC  lisinopril, 20 mg, oral, Daily  [Held by provider] metFORMIN, 1,000 mg, oral, BID  polyethylene glycol, 17 g, oral, Daily  propranolol, 10 mg, oral, TID  thiamine, 100 mg, oral, Daily      Continuous medications     PRN medications  PRN medications: dextrose, dextrose, dextrose, dextrose, glucagon, glucagon, glucagon, glucagon, LORazepam, metoprolol, QUEtiapine          No results found for this or any previous visit (from the past 24 hours).   Assessment/Plan   Principal Problem:    Altered mental status, unspecified altered mental status type    Assessment:  59M history of schizophrenia (bipolar disorder per chart review) and diabetes, who returned to ED from inpatient psych for medical exam due to worsening alterations in mentation, refusal to take PO, and unstable vital signs. Psychiatry was consulted on 11/6 for psychiatric evaluation. His outpatient medication regimen appears to be benztropine 0.5 mg BID, carbamazepine 400 mg nightly, clozapine 500 mg nightly, propranolol 10 mg TID.      11/22:  Patient seen at bedside today, he continues to deny complaints and feelings of paranoia/AVH/SI. On exam, he appears linear, logical and coherent but is at times difficult to understand due to speaking softly and speaking primarily in 1-3 word sentence fragments. He does not appear internally stimulated at this time. Psychosis improving. Placement to an inpatient Psychiatric unit remains main issue for the patient, given he is out of medicare bed  days. However, patient is currently pending placement at [facility] as of 11/22.    11/23: Psychosis appears to be in remission.  No delusions identified, no AVH, denies SI and HI.  Affect blunt.     Impression:  Psychosis, improving  Catatonia - resolved  Schizophrenia     Recommendations:  Safety/Monitoring:  Patient does not meet criteria for inpatient psychiatric admission  Patient does not require 1:1 observation from a psychiatric perspective, defer to primary team  Daily interdisciplinary safety and planning huddle with Psychiatry  Video monitoring as with all patients on the MPU  Psychiatry will follow patient daily while on the MPU     Medications:  - Continue haldol decanoate 300mg every 28 days, (last dose 11/14/24)  - Continue IV ativan 1 mg Q8h PRN agitation, can add PO/IM if no IV access     Considerations:  - continue bowel regimen per primary given clozapine discontinuation     Therapies recommended:  art therapy, music therapy, pet therapy, recreational therapy, and will continue to assess for benefit and meaningful participation     Delirium Guidelines  Provide glasses, hearing aids and/or communication boards as needed for impairments  Frequent reorientation, minimize room and staff changes  Open blinds during the day, dark/quiet room at night   Minimal interruptions and daytime naps  Early evaluation and intervention by PT, out of bed as tolerated  Minimize use of restraints   Minimize use of benzodiazepines, anticholinergic medications, and opiates (while ensuring adequate treatment of pain)  Keep Mg>2, K>4 (as able)  Ensure regular bowel and bladder function (as able)     Disposition/Discharge Planning:  PITO following, appreciate assistance  -As of SW note 11/22, patient pending placement to [facility].                I spent 45 minutes in the professional and overall care of this patient.      Sebastián Oseguera MD

## 2024-11-24 LAB — GLUCOSE BLD MANUAL STRIP-MCNC: 192 MG/DL (ref 74–99)

## 2024-11-24 PROCEDURE — 99232 SBSQ HOSP IP/OBS MODERATE 35: CPT | Performed by: STUDENT IN AN ORGANIZED HEALTH CARE EDUCATION/TRAINING PROGRAM

## 2024-11-24 PROCEDURE — 2500000002 HC RX 250 W HCPCS SELF ADMINISTERED DRUGS (ALT 637 FOR MEDICARE OP, ALT 636 FOR OP/ED): Performed by: STUDENT IN AN ORGANIZED HEALTH CARE EDUCATION/TRAINING PROGRAM

## 2024-11-24 PROCEDURE — 2500000001 HC RX 250 WO HCPCS SELF ADMINISTERED DRUGS (ALT 637 FOR MEDICARE OP): Performed by: STUDENT IN AN ORGANIZED HEALTH CARE EDUCATION/TRAINING PROGRAM

## 2024-11-24 PROCEDURE — 99232 SBSQ HOSP IP/OBS MODERATE 35: CPT | Performed by: PSYCHIATRY & NEUROLOGY

## 2024-11-24 PROCEDURE — 82947 ASSAY GLUCOSE BLOOD QUANT: CPT

## 2024-11-24 PROCEDURE — 1100000001 HC PRIVATE ROOM DAILY

## 2024-11-24 ASSESSMENT — PAIN SCALES - GENERAL
PAINLEVEL_OUTOF10: 0 - NO PAIN
PAINLEVEL_OUTOF10: 0 - NO PAIN

## 2024-11-24 ASSESSMENT — COGNITIVE AND FUNCTIONAL STATUS - GENERAL
CLIMB 3 TO 5 STEPS WITH RAILING: A LITTLE
MOBILITY SCORE: 24
MOBILITY SCORE: 23
DAILY ACTIVITIY SCORE: 24

## 2024-11-24 ASSESSMENT — PAIN - FUNCTIONAL ASSESSMENT: PAIN_FUNCTIONAL_ASSESSMENT: 0-10

## 2024-11-24 ASSESSMENT — PAIN SCALES - WONG BAKER: WONGBAKER_NUMERICALRESPONSE: NO HURT

## 2024-11-24 NOTE — CARE PLAN
Problem: Fall/Injury  Goal: Verbalize understanding of personal risk factors for fall in the hospital  Outcome: Not Progressing  Goal: Verbalize understanding of risk factor reduction measures to prevent injury from fall in the home  Outcome: Not Progressing  Goal: Use assistive devices by end of the shift  Outcome: Progressing  Goal: Pace activities to prevent fatigue by end of the shift  Outcome: Progressing     Problem: Pain - Adult  Goal: Verbalizes/displays adequate comfort level or baseline comfort level  Outcome: Progressing     Problem: Safety - Adult  Goal: Free from fall injury  Outcome: Progressing   The patient's goals for the shift include      Problem: Fall/Injury  Goal: Verbalize understanding of personal risk factors for fall in the hospital  Outcome: Not Progressing  Goal: Verbalize understanding of risk factor reduction measures to prevent injury from fall in the home  Outcome: Not Progressing     Problem: Diabetes  Goal: Increase self care and/or family involovement by end of shift  Outcome: Not Progressing

## 2024-11-24 NOTE — PROGRESS NOTES
Clayton Parra is a 59 y.o. y.o. male on day 18 of admission presenting with Altered mental status, unspecified altered mental status type [R41.82].     Subjective   Patient remains medically ready for discharge and no longer meets criteria for inpatient psychiatry. Patient is currently pending authorization to discharge to [facility]. Level II Evaluation was completed on 11/20 and currently awaiting outcome.    SW will continue to follow.    - Halina QUIJANO, MA, LSW  Care Transitions   [facility] Secure Chat or [phone]

## 2024-11-24 NOTE — NURSING NOTE
"Upon arrival at the patients bedside the patient refused a head to toe assessment and medications. Rn  explained the benefits of taking his medications in  a clear and understandable way. Rn asked the patient the reasons he did not want to take his medications he said he is not a diabetic and he does not need the medication. Next time Rn came to the bedside and  asked Mr. Parra  to take his medications he said\" no\", and disengaged himself from the conversation. The patient is safe and stable care transferred without incidence  "

## 2024-11-24 NOTE — PROGRESS NOTES
"Clayton Parra is a 59 y.o. male on Hospital Day: 19 of admission presenting with Altered mental status, unspecified altered mental status type.     Subjective   No acute events overnight.  Patient continues to refuse care.  Patient seen in room and states that he is feeling perfect and ready to go to [facility]. \Hospital Has a New Name and Outlook.\"".  Patient had no other concerns at this time       Objective     Physical Exam  Vitals and nursing note reviewed.   Constitutional:       General: He is not in acute distress.     Comments: Seated in chair just finished eating his lunch   Eyes:      General: No scleral icterus.     Extraocular Movements: Extraocular movements intact.   Cardiovascular:      Rate and Rhythm: Normal rate and regular rhythm.      Heart sounds: No murmur heard.  Pulmonary:      Effort: Pulmonary effort is normal. No respiratory distress.      Breath sounds: Normal breath sounds.   Abdominal:      General: Abdomen is flat. There is no distension.      Palpations: Abdomen is soft.      Tenderness: There is no abdominal tenderness.   Musculoskeletal:         General: No swelling. Normal range of motion.   Skin:     General: Skin is warm and dry.   Neurological:      Mental Status: He is alert and oriented to person, place, and time.      Motor: Tremor (Resting, bilateral upper extremity) present.         Last Recorded Vitals  Blood pressure 149/68, pulse 68, temperature 36.5 °C (97.7 °F), resp. rate 17, height 1.727 m (5' 8\"), weight 99.8 kg (220 lb), SpO2 97%.  Intake/Output last 3 Shifts:  No intake/output data recorded.    Relevant Results  Results reviewed       Scheduled Medications:  aspirin, 81 mg, oral, Daily  atorvastatin, 40 mg, oral, Nightly  docusate sodium, 100 mg, oral, Daily  enoxaparin, 40 mg, subcutaneous, q24h  ferrous sulfate (325 mg ferrous sulfate), 65 mg of iron, oral, Daily with breakfast  haloperidol decanoate, 300 mg, intramuscular, q28 days  insulin glargine, 20 Units, subcutaneous, Nightly  insulin " lispro, 0-10 Units, subcutaneous, q6h  insulin lispro, 18 Units, subcutaneous, TID AC  lisinopril, 20 mg, oral, Daily  [Held by provider] metFORMIN, 1,000 mg, oral, BID  polyethylene glycol, 17 g, oral, Daily  propranolol, 10 mg, oral, TID  thiamine, 100 mg, oral, Daily      Continuous Medications:     PRN Medications:  PRN medications: dextrose, dextrose, dextrose, dextrose, glucagon, glucagon, glucagon, glucagon, LORazepam, metoprolol, QUEtiapine       Assessment/Plan   Clayton Parra is a 59 y.o. male on Hospital Day: 19 of admission presenting with Altered mental status, unspecified altered mental status type.  Encephalopathy and catatonia resolved. Medically ready for discharge pending state level II evaluation.     #Acute encephalopathy: Metabolic versus toxic versus psychogenic  - Presented with catatonia  - Encephalopathy and catatonia now resolved, thought to be from antipsychotic regimen.  - CT head on 11/6 negative for intracranial hemorrhage   - S/p Acyclovir, ampicillin, ceftriaxone and vancomycin in the ED for empiric meningitis coverage. Bcx, WBC negative, afebrile, procal neg. No infectious source identified so far, Will not initiate abx at the moment.    - LP 11/8 : Neutrophils 42, protein 83, glucose 151, RBC 3000.   - Psych following:      - Continue haldol decanoate 300mg every 28 days to be given 11/14     - Continue IV ativan 1 mg Q8h PRN agitation, can add PO/IM if no IV access     - [place] not meet inpatient psych admission   - PT/OT: Seen evaluated for gait training transfer training balance training therapeutic exercise therapeutic activity, no further acute PT recommended.     #Insulin-dependent type 2 diabetes  - POC trend reviewed, patient not accepting all checks  - Metformin 1000 mg twice daily on hold  - Continue Lantus 20 mg at bedtime, patient has been refusing for over the past week  - Lispro insulin sliding scale in place, mealtime 18 unit 3 times daily      #Hypertension  -Lisinopril 20 mg daily  -Propranolol 10 mg 3 times daily     #Hyperlipidemia  -Atorvastatin 40 mg daily, aspirin 81 mg daily      #DVT prophylaxis  -Lovenox 40 mg subcu daily    Disposition: Medially cleared for discharge, pending State Level II assessment      I spent 40 minutes in the professional and overall care of this patient.             Konrad Cabrales MD

## 2024-11-24 NOTE — CARE PLAN
The patient was again non compliant today. He allowed me to take his blood sugar and agreed to take the medication and insulin,, once scanned and take the pt the cup of meds, he yelled NO and put the cup on the ground. Pt slept all shift. No appetite.     Laura PALACIOS      Problem: Fall/Injury  Goal: Verbalize understanding of personal risk factors for fall in the hospital  Outcome: Progressing     Problem: Skin  Goal: Prevent/manage excess moisture  Outcome: Progressing  Flowsheets (Taken 11/24/2024 1121)  Prevent/manage excess moisture:   Cleanse incontinence/protect with barrier cream   Moisturize dry skin     Problem: Diabetes  Goal: No changes in neurological exam by end of shift  Outcome: Progressing     Problem: Diabetes  Goal: Increase self care and/or family involovement by end of shift  Outcome: Progressing

## 2024-11-24 NOTE — CONSULTS
"Clayton Parra is a 59 y.o. male on day 18 of admission presenting with Altered mental status, unspecified altered mental status type.      Subjective   Attempted to interview patient today. Is a very limited historian due to speaking only in short, low-volume phrase.  Endorses having no issues with depression, sleep, or hallucinations.        Objective     Last Recorded Vitals  Blood pressure 149/68, pulse 68, temperature 36.5 °C (97.7 °F), resp. rate 17, height 1.727 m (5' 8\"), weight 99.8 kg (220 lb), SpO2 97%.    Review of Systems    Psychiatric ROS - Adult  Anxiety: Negative  Depression: negative  Delirium: negative  Psychosis: negative  Bhavya: negative  Safety Issues: none  Psychiatric ROS Comment: limited historian        Mental Status Exam  Mental Status Examination  General Appearance: Fairly groomed.  Gait/Station:  on gurney.  Speech: Barely audible speech, normal rate, flattened prosody  Mood: endorses no depression  Affect: Flat and Blunted  Thought Process: Linear, goal directed, Paucity of content  Thought Associations: No loosening of associations  Thought Content: normal. Denies SI and HI.  Perception: No perceptual abnormalities noted  Level of Consciousness: Alert  Orientation: Alert  Attention and Concentration: Intact  Recent Memory: Unable to assess   Remote Memory: Unable to assess     Psychiatric Risk Assessment  Violence Risk Assessment: acces to weapons, major mental illness, male, and unemployment  Acute Risk of Harm to Others is Considered: low   Suicide Risk Assessment: access to weapons, chronic medical illness, current psychiatric illness, and male  Protective Factors against Suicide: adherence to  treatment  Acute Risk of Harm to Self is Considered: low    Relevant Results             Scheduled medications  aspirin, 81 mg, oral, Daily  atorvastatin, 40 mg, oral, Nightly  docusate sodium, 100 mg, oral, Daily  enoxaparin, 40 mg, subcutaneous, q24h  ferrous sulfate (325 mg ferrous " sulfate), 65 mg of iron, oral, Daily with breakfast  haloperidol decanoate, 300 mg, intramuscular, q28 days  insulin glargine, 20 Units, subcutaneous, Nightly  insulin lispro, 0-10 Units, subcutaneous, q6h  insulin lispro, 18 Units, subcutaneous, TID AC  lisinopril, 20 mg, oral, Daily  [Held by provider] metFORMIN, 1,000 mg, oral, BID  polyethylene glycol, 17 g, oral, Daily  propranolol, 10 mg, oral, TID  thiamine, 100 mg, oral, Daily      Continuous medications     PRN medications  PRN medications: dextrose, dextrose, dextrose, dextrose, glucagon, glucagon, glucagon, glucagon, LORazepam, metoprolol, QUEtiapine     Results for orders placed or performed during the hospital encounter of 11/06/24 (from the past 24 hours)   POCT GLUCOSE   Result Value Ref Range    POCT Glucose 184 (H) 74 - 99 mg/dL   POCT GLUCOSE   Result Value Ref Range    POCT Glucose 192 (H) 74 - 99 mg/dL              Assessment/Plan   Principal Problem:    Altered mental status, unspecified altered mental status type    59M history of schizophrenia (bipolar disorder per chart review) and diabetes, who returned to ED from inpatient psych for medical exam due to worsening alterations in mentation, refusal to take PO, and unstable vital signs. Psychiatry was consulted on 11/6 for psychiatric evaluation. His outpatient medication regimen appears to be benztropine 0.5 mg BID, carbamazepine 400 mg nightly, clozapine 500 mg nightly, propranolol 10 mg TID.      11/22:  Patient seen at bedside today, he continues to deny complaints and feelings of paranoia/AVH/SI. On exam, he appears linear, logical and coherent but is at times difficult to understand due to speaking softly and speaking primarily in 1-3 word sentence fragments. He does not appear internally stimulated at this time. Psychosis improving. Placement to an inpatient Psychiatric unit remains main issue for the patient, given he is out of medicare bed days. However, patient is currently pending  placement at [facility] as of 11/22.     11/23: Psychosis appears to be in remission.  No delusions identified, no AVH, denies SI and HI.  Affect blunt.    11/24: Psychosis continues to be in remission.      Impression:  Psychosis, improving  Catatonia - resolved  Schizophrenia     Recommendations:  Safety/Monitoring:  Patient does not meet criteria for inpatient psychiatric admission  Patient does not require 1:1 observation from a psychiatric perspective, defer to primary team  Daily interdisciplinary safety and planning huddle with Psychiatry  Video monitoring as with all patients on the MPU  Psychiatry will follow patient daily while on the MPU     Medications:  - Continue haldol decanoate 300mg every 28 days, (last dose 11/14/24)  - Continue IV ativan 1 mg Q8h PRN agitation, can add PO/IM if no IV access     Considerations:  - continue bowel regimen per primary given clozapine discontinuation     Therapies recommended:  art therapy, music therapy, pet therapy, recreational therapy, and will continue to assess for benefit and meaningful participation     Delirium Guidelines  Provide glasses, hearing aids and/or communication boards as needed for impairments  Frequent reorientation, minimize room and staff changes  Open blinds during the day, dark/quiet room at night   Minimal interruptions and daytime naps  Early evaluation and intervention by PT, out of bed as tolerated  Minimize use of restraints   Minimize use of benzodiazepines, anticholinergic medications, and opiates (while ensuring adequate treatment of pain)  Keep Mg>2, K>4 (as able)  Ensure regular bowel and bladder function (as able)     Disposition/Discharge Planning:  PITO following, appreciate assistance  -As of SW note 11/22, patient pending placement to [facility].                    I spent 25 minutes in the professional and overall care of this patient.      Sebastián Oseguera MD

## 2024-11-25 VITALS
DIASTOLIC BLOOD PRESSURE: 68 MMHG | HEART RATE: 77 BPM | TEMPERATURE: 98.4 F | WEIGHT: 220 LBS | OXYGEN SATURATION: 98 % | BODY MASS INDEX: 33.34 KG/M2 | RESPIRATION RATE: 17 BRPM | HEIGHT: 68 IN | SYSTOLIC BLOOD PRESSURE: 145 MMHG

## 2024-11-25 LAB
GLUCOSE BLD MANUAL STRIP-MCNC: 215 MG/DL (ref 74–99)
GLUCOSE BLD MANUAL STRIP-MCNC: 223 MG/DL (ref 74–99)

## 2024-11-25 PROCEDURE — 1100000001 HC PRIVATE ROOM DAILY

## 2024-11-25 PROCEDURE — 99233 SBSQ HOSP IP/OBS HIGH 50: CPT | Performed by: PSYCHIATRY & NEUROLOGY

## 2024-11-25 PROCEDURE — 82947 ASSAY GLUCOSE BLOOD QUANT: CPT

## 2024-11-25 PROCEDURE — 99232 SBSQ HOSP IP/OBS MODERATE 35: CPT | Performed by: STUDENT IN AN ORGANIZED HEALTH CARE EDUCATION/TRAINING PROGRAM

## 2024-11-25 ASSESSMENT — COGNITIVE AND FUNCTIONAL STATUS - GENERAL
MOVING TO AND FROM BED TO CHAIR: TOTAL
MOBILITY SCORE: 23
HELP NEEDED FOR BATHING: TOTAL
STANDING UP FROM CHAIR USING ARMS: TOTAL
MOVING FROM LYING ON BACK TO SITTING ON SIDE OF FLAT BED WITH BEDRAILS: TOTAL
TOILETING: TOTAL
WALKING IN HOSPITAL ROOM: TOTAL
CLIMB 3 TO 5 STEPS WITH RAILING: A LITTLE
DAILY ACTIVITIY SCORE: 24
CLIMB 3 TO 5 STEPS WITH RAILING: A LITTLE
DRESSING REGULAR LOWER BODY CLOTHING: TOTAL
TURNING FROM BACK TO SIDE WHILE IN FLAT BAD: TOTAL
MOBILITY SCORE: 8
DRESSING REGULAR UPPER BODY CLOTHING: TOTAL

## 2024-11-25 ASSESSMENT — PAIN SCALES - WONG BAKER: WONGBAKER_NUMERICALRESPONSE: NO HURT

## 2024-11-25 ASSESSMENT — PAIN - FUNCTIONAL ASSESSMENT
PAIN_FUNCTIONAL_ASSESSMENT: 0-10
PAIN_FUNCTIONAL_ASSESSMENT: 0-10

## 2024-11-25 ASSESSMENT — PAIN SCALES - GENERAL
PAINLEVEL_OUTOF10: 0 - NO PAIN
PAINLEVEL_OUTOF10: 0 - NO PAIN

## 2024-11-25 NOTE — PROGRESS NOTES
"  Clayton Parra is a 59 y.o. male on Hospital Day: 20 of admission presenting with Altered mental status, unspecified altered mental status type.    Subjective   Patient seen at bedside.  No acute events overnight.  Patient complains of headache this morning hypomelanotic any medications for it.  No other concerns raised.       Objective     Physical Exam  Vitals and nursing note reviewed.   Constitutional:       General: He is not in acute distress.     Comments:  laying in bed   Eyes:      General: No scleral icterus.     Extraocular Movements: Extraocular movements intact.   Cardiovascular:      Rate and Rhythm: Normal rate and regular rhythm.      Heart sounds: No murmur heard.  Pulmonary:      Effort: Pulmonary effort is normal. No respiratory distress.      Breath sounds: Normal breath sounds.   Abdominal:      General: Abdomen is flat. There is no distension.      Palpations: Abdomen is soft.      Tenderness: There is no abdominal tenderness.   Musculoskeletal:         General: No swelling. Normal range of motion.   Skin:     General: Skin is warm and dry.   Neurological:      Mental Status: He is alert and oriented to person, place, and time.      Motor: Tremor (Resting, bilateral upper extremity) present.       Last Recorded Vitals  Blood pressure 145/68, pulse 77, temperature 36.9 °C (98.4 °F), resp. rate 17, height 1.727 m (5' 8\"), weight 99.8 kg (220 lb), SpO2 98%.  Intake/Output last 3 Shifts:  No intake/output data recorded.    Relevant Results  Results reviewed       Scheduled Medications:  aspirin, 81 mg, oral, Daily  atorvastatin, 40 mg, oral, Nightly  docusate sodium, 100 mg, oral, Daily  enoxaparin, 40 mg, subcutaneous, q24h  ferrous sulfate (325 mg ferrous sulfate), 65 mg of iron, oral, Daily with breakfast  haloperidol decanoate, 300 mg, intramuscular, q28 days  insulin glargine, 20 Units, subcutaneous, Nightly  insulin lispro, 0-10 Units, subcutaneous, q6h  insulin lispro, 18 Units, " subcutaneous, TID AC  lisinopril, 20 mg, oral, Daily  [Held by provider] metFORMIN, 1,000 mg, oral, BID  polyethylene glycol, 17 g, oral, Daily  propranolol, 10 mg, oral, TID  thiamine, 100 mg, oral, Daily      Continuous Medications:     PRN Medications:  PRN medications: dextrose, dextrose, dextrose, dextrose, glucagon, glucagon, glucagon, glucagon, LORazepam, metoprolol, QUEtiapine       Assessment/Plan   Clayton Parra is a 59 y.o. male on Hospital Day: 20 of admission presenting with Altered mental status, unspecified altered mental status type.  Encephalopathy and catatonia resolved. Medically ready for discharge pending state level II evaluation.     #Acute encephalopathy: Metabolic versus toxic versus psychogenic  - Encephalopathy and catatonia now resolved, thought to be from antipsychotic regimen.  - CT head on 11/6 negative for intracranial hemorrhage   - S/p Acyclovir, ampicillin, ceftriaxone and vancomycin in the ED for empiric meningitis coverage. Bcx, WBC negative, afebrile, procal neg. No infectious source identified so far, Will not initiate abx at the moment.    - LP 11/8 : Neutrophils 42, protein 83, glucose 151, RBC 3000.   - Psych following:      - Continue haldol decanoate 300mg every 28 days, last dose on 11/14     - Continue IV ativan 1 mg Q8h PRN agitation, can add PO/IM if no IV access     - [place] meet inpatient psych admission   - PT/OT: Seen evaluated for gait training transfer training balance training therapeutic exercise therapeutic activity, no further acute PT recommended.     #Insulin-dependent type 2 diabetes  - POC trend reviewed, patient not accepting all checks  - Metformin 1000 mg twice daily on hold  - Continue Lantus 20 mg at bedtime, patient has been refusing for over the past week  - Lispro insulin sliding scale in place, mealtime 18 unit 3 times daily    #Hypertension  -Lisinopril 20 mg daily  -Propranolol 10 mg 3 times daily      #Hyperlipidemia  -Atorvastatin 40 mg daily, aspirin 81 mg daily      #DVT prophylaxis  -Lovenox 40 mg subcu daily    Disposition: Medially cleared for discharge, pending State Level II assessment      I spent 35 minutes in the professional and overall care of this patient.           Konrad Cabrales MD

## 2024-11-25 NOTE — PROGRESS NOTES
"Clayton Parra is a 59 y.o. y.o. male on day 19 of admission presenting with Altered mental status, unspecified altered mental status type [R41.82].     Subjective   Received update from [facility] that patients Level II Evaluation was completed and determination now available. Requested copy of document be faxed to  which was received and sent to [facility] via [facility]. Noted that patient has been deemed a \"rule out\" for further evaluation by the Board of Mental Health.     Inquired if patient is able to admit to [facility] today; awaiting response from facility.    UPDATE 1130 Received update that facility has submitted for LTC precert; will await authorization. MD updated.    - Halina HAYA, MA, LSW  Care Transitions   [facility] Secure Chat or [phone]        "

## 2024-11-25 NOTE — PROGRESS NOTES
"Clayton Parra is a 59 y.o. male on hospital day 19 of admission presenting with Altered mental status, unspecified altered mental status type.    Subjective   Patient was seen at bedside today and gave primarily one to three word answers, which limited interview.     Patient reports his mood is fine and he is \"just trying to behave in bed.\" He denies AVH but does observe treatment team some what suspiciously. He denies any complaints, pain, or SI.    =============================================  Additional info: No PRNs required overnight. Per nursing notes over the weekend, patient intermittently refusing meds and assessment but did not require PRNs for agitation over the weekend.     Objective   -Mild fine resting tremors b/L UE.    Vital Signs:      11/20/2024     7:45 AM 11/20/2024     3:46 PM 11/21/2024    12:00 AM 11/21/2024     7:43 AM 11/21/2024     5:46 PM 11/22/2024     8:12 AM 11/25/2024     7:42 AM   Vitals   Systolic 147 156 148 94 148 149 145   Diastolic 79 100 82 62 81 68 68   Heart Rate 69 65 66 70 69 68 77   Temp 36.3 °C (97.3 °F)  37 °C (98.6 °F) 36.7 °C (98.1 °F) 36.6 °C (97.9 °F) 36.5 °C (97.7 °F) 36.9 °C (98.4 °F)   Resp 18 18 18  16 17       Intake/Output last 3 Shifts:  No intake/output data recorded.    Mental Status Exam:  General/Appearance: No acute distress, laying in bed  in hospital gown, grooming and hygiene adequate. Bald male, appears younger than stated age.   Attitude/Behavior: engages in interview, initially guarded but then open and polite on interview. Calm, unblinking. Appears somewhat paranoid.  Motor Activity: no psychomotor agitation/retardation, no tics/tremors, no EPS/TD, gait: not assessed  Mood: \"\"just trying to behave in bed\"  Affect: Blunted, cooperative, guarded.  Speech: speaks softly, normal rate, speaks in 1-3 word sentence fragments.  Thought Process: Does not appear to be disorganized in this limited interview.  Thought Content: unable to assess, Delusional " thinking: paranoid per chart review, appears somewhat paranoid today, observing treatment team suspiciously. Patient states he feels safe on the unit.  Thought Perception: denies AVH, appears somewhat internally stimulated and will frequently look around the room and stop talking in the middle of his sentences.  Cognition: no overt deficits noted, not formally assessed  Insight: limited  Judgment: limited    Current Medications  Scheduled   aspirin, 81 mg, oral, Daily  atorvastatin, 40 mg, oral, Nightly  docusate sodium, 100 mg, oral, Daily  enoxaparin, 40 mg, subcutaneous, q24h  ferrous sulfate (325 mg ferrous sulfate), 65 mg of iron, oral, Daily with breakfast  haloperidol decanoate, 300 mg, intramuscular, q28 days  insulin glargine, 20 Units, subcutaneous, Nightly  insulin lispro, 0-10 Units, subcutaneous, q6h  insulin lispro, 18 Units, subcutaneous, TID AC  lisinopril, 20 mg, oral, Daily  [Held by provider] metFORMIN, 1,000 mg, oral, BID  polyethylene glycol, 17 g, oral, Daily  propranolol, 10 mg, oral, TID  thiamine, 100 mg, oral, Daily        PRN   PRN medications: dextrose, dextrose, dextrose, dextrose, glucagon, glucagon, glucagon, glucagon, LORazepam, metoprolol, QUEtiapine     Labs  Results for orders placed or performed during the hospital encounter of 11/06/24 (from the past 24 hours)   POCT GLUCOSE   Result Value Ref Range    POCT Glucose 215 (H) 74 - 99 mg/dL        Imaging  No results found.     Psychiatric Risk Assessment  Acute Risk of Harm to Others is Considered: Low  Acute Risk of Harm to Self is Considered: Low    Assessment:  59M history of schizophrenia (bipolar disorder per chart review) and diabetes, who returned to ED from inpatient psych for medical exam due to worsening alterations in mentation, refusal to take PO, and unstable vital signs. Psychiatry was consulted on 11/6 for psychiatric evaluation. His outpatient medication regimen appears to be benztropine 0.5 mg BID, carbamazepine 400  mg nightly, clozapine 500 mg nightly, propranolol 10 mg TID.     Update 11/25: Patient seen at bedside today. Still speaking in short sentence fragments, denying pain, AVH, or any other complaints at this time. On exam, he is somewhat paranoid and unblinking and guarded, although cooperative with interview. Patient pending placement to [facility].    Impression:  Psychosis, improving  Catatonia - resolved  Schizophrenia    Recommendations:  Safety/Monitoring:  Patient does not meet criteria for inpatient psychiatric admission  Patient does not require 1:1 observation from a psychiatric perspective, defer to primary team  Daily interdisciplinary safety and planning huddle with Psychiatry  Video monitoring as with all patients on the MPU  Psychiatry will follow patient daily while on the MPU    Medications:  - Discontinue clozapine  - Discontinue scheduled ativan 1mg Q6 IV  - Discontinue ativan 1 mg IM q6h PRN severe agitation  - Continue haldol decanoate 300mg every 28 days, (last dose 11/14/24)  - Continue IV ativan 1 mg Q8h PRN agitation, can add PO/IM if no IV access    Considerations:  - continue bowel regimen per primary given clozapine discontinuation    Therapies recommended:  art therapy, music therapy, pet therapy, recreational therapy, and will continue to assess for benefit and meaningful participation    Delirium Guidelines  Provide glasses, hearing aids and/or communication boards as needed for impairments  Frequent reorientation, minimize room and staff changes  Open blinds during the day, dark/quiet room at night   Minimal interruptions and daytime naps  Early evaluation and intervention by PT, out of bed as tolerated  Minimize use of restraints   Minimize use of benzodiazepines, anticholinergic medications, and opiates (while ensuring adequate treatment of pain)  Keep Mg>2, K>4 (as able)  Ensure regular bowel and bladder function (as able)    Disposition/Discharge Planning:  SW following,  appreciate assistance  -still pending placement to [facility].    Multidisciplinary Rounding  Consulting Physician, Fellow, Medical Student, Charge Nurse, Nurse, Pharmacist, Social Work, Care Coordinator, Dietician, Music Therapy, Art Therapy, Recreational Therapy, and     Medication Consent  N/A - Consult Service    This patient was staffed and discussed with fellow, Dr. Israel and attending Dr. Vaca.     Karrie Dove M.D.  Psychiatry PGY2

## 2024-11-25 NOTE — CARE PLAN
No changed with Mr. Parra, refused all medications. Continues to say there is nothing wrong with him and tthis is why he is not taking them. Still no appetite.   Bed low/loocked, call light within reach    Laura PALACIOS      Problem: Fall/Injury  Goal: Verbalize understanding of personal risk factors for fall in the hospital  Outcome: Progressing     Problem: Skin  Goal: Prevent/manage excess moisture  Outcome: Progressing     Problem: Pain - Adult  Goal: Verbalizes/displays adequate comfort level or baseline comfort level  Outcome: Progressing

## 2024-11-25 NOTE — CARE PLAN
Problem: Fall/Injury  Goal: Verbalize understanding of personal risk factors for fall in the hospital  Outcome: Progressing     Problem: Fall/Injury  Goal: Verbalize understanding of risk factor reduction measures to prevent injury from fall in the home  Outcome: Progressing     Problem: Safety - Adult  Goal: Free from fall injury  Outcome: Progressing     Problem: Diabetes  Goal: Maintain glucose levels >70mg/dl to <250mg/dl throughout shift  Outcome: Progressing     Problem: Diabetes  Goal: Learn about and adhere to nutrition recommendations by end of shift  Outcome: Progressing   The patient's goals for the shift include      The clinical goals for the shift include Pt will remiain free of falls and injuries during shift    Over the shift, the patient did make progress toward the following goals. Pt remained free of falls and injuries during this shift. Pt still refusing blood glucose check, non compliant with his bedtime meds. Snack offered at bedtime, Pt refused. Slept most of the night. Denies pain. Safety measures maintained.

## 2024-11-26 PROCEDURE — 99233 SBSQ HOSP IP/OBS HIGH 50: CPT | Performed by: PSYCHIATRY & NEUROLOGY

## 2024-11-26 PROCEDURE — 99238 HOSP IP/OBS DSCHRG MGMT 30/<: CPT | Performed by: INTERNAL MEDICINE

## 2024-11-26 ASSESSMENT — COGNITIVE AND FUNCTIONAL STATUS - GENERAL
DAILY ACTIVITIY SCORE: 23
MOBILITY SCORE: 23
CLIMB 3 TO 5 STEPS WITH RAILING: A LITTLE
PERSONAL GROOMING: A LITTLE

## 2024-11-26 ASSESSMENT — PAIN SCALES - GENERAL
PAINLEVEL_OUTOF10: 0 - NO PAIN
PAINLEVEL_OUTOF10: 0 - NO PAIN

## 2024-11-26 ASSESSMENT — PAIN - FUNCTIONAL ASSESSMENT
PAIN_FUNCTIONAL_ASSESSMENT: 0-10
PAIN_FUNCTIONAL_ASSESSMENT: 0-10

## 2024-11-26 ASSESSMENT — PAIN SCALES - WONG BAKER: WONGBAKER_NUMERICALRESPONSE: NO HURT

## 2024-11-26 NOTE — PROGRESS NOTES
Music Therapy Note    Clayton Parra     Therapy Session  Referral Type: New referral this admission  Visit Type: Follow-up visit  Session Start Time: 1422  Session End Time: 1423  Intervention Delivery: In-person  Conflict of Service: Asleep  Family Present for Session: None        Treatment/Interventions       Post-assessment  Total Session Time (min): 1 minutes    Narrative  Assessment Detail: Pt was found asleep when MTI attempted session. MTI did knock on the door, and pt did not wake up.  Follow-up: MTI will f/u at another time.    Education Documentation  No documentation found.

## 2024-11-26 NOTE — PROGRESS NOTES
Music Therapy Note    Clayton Parra     Therapy Session  Referral Type: New referral this admission  Visit Type: Follow-up visit  Session Start Time: 1447  Session End Time: 1448  Intervention Delivery: In-person  Conflict of Service: Asleep  Family Present for Session: None        Treatment/Interventions       Post-assessment  Total Session Time (min): 1 minutes    Narrative  Assessment Detail: Pt was found lying in bed, asleep when MTI attempted session. MTI did not attempt to wake pt at this time.  Follow-up: MTI will f/u at another time.    Education Documentation  No documentation found.

## 2024-11-26 NOTE — PROGRESS NOTES
Clayton Parra is a 59 y.o. y.o. male on day 20 of admission presenting with Altered mental status, unspecified altered mental status type [R41.82].     Subjective      11/26/24 0927   Discharge Planning   Home or Post Acute Services Post acute facilities (Rehab/SNF/etc)   Type of Post Acute Facility Services Long term care  ([facility]:  [phone])   Expected Discharge Disposition Inter   Does the patient need discharge transport arranged? Yes   RoundTrip coordination needed? Yes   Has discharge transport been arranged? Yes   What day is the transport expected? 11/26/24   What time is the transport expected? 1200  (via [facility])     Received update that patient has authorization to admit to [facility] and confirmed that patient is medically ready for discharge. Transportation requested in Roundtrip and received confirmed  time of 12:00pm with [facility]. Completed [place]/AVS uploaded via [employer]. Blue slip provided to unit secretary and bedside RN updated of discharge.     Call placed to patients sister/legal guardian Mehnaz to provide an update on above; detail VM left regarding discharge information + reviewed IMM letter.    - Halina QUIJANO, MA, LSW  Care Transitions   [facility] Secure Chat or [phone]

## 2024-11-26 NOTE — PROGRESS NOTES
Music Therapy Note    Clayton Parra     Therapy Session  Referral Type: New referral this admission  Visit Type: New visit  Session Start Time: 1353  Session End Time: 1355  Intervention Delivery: In-person  Conflict of Service: None  Family Present for Session: None        Treatment/Interventions       Post-assessment  Total Session Time (min): 2 minutes    Narrative  Assessment Detail: Pt was sitting in chair, awake and alert. Pt was receptive to music therapy education though stating he did not want a session at this time. Pt discussed genre preferences sharing that he likes gospel. Pt was agreeable to a f/u tomorrow.  Follow-up: MTI will f/u accordingly.    Education Documentation  No documentation found.

## 2024-11-26 NOTE — PROGRESS NOTES
Music Therapy Note    Clayton Parra     Therapy Session  Referral Type: New referral this admission  Visit Type: Follow-up visit  Session Start Time: 1507  Session End Time: 1507  Intervention Delivery: In-person  Conflict of Service: Asleep  Family Present for Session: None        Treatment/Interventions       Post-assessment  Total Session Time (min): 0 minutes    Narrative  Assessment Detail: Pt was found asleep when MTI attempted session. Pt did not wake to knocking or MTI saying pt's name.  Follow-up: MTI will f/u at another time.    Education Documentation  No documentation found.

## 2024-11-26 NOTE — CARE PLAN
No changed with the patient. Refused all morning medications.   Pt discharged at 1217 by [facility] at [facility]. All belongings returned back with patient. No IV to remove.     Laura PALACIOS      Problem: Fall/Injury  Goal: Verbalize understanding of personal risk factors for fall in the hospital  Outcome: Progressing     Problem: Skin  Goal: Prevent/manage excess moisture  Outcome: Progressing     Problem: Safety - Adult  Goal: Free from fall injury  Outcome: Progressing     Problem: Diabetes  Goal: No changes in neurological exam by end of shift  Outcome: Progressing

## 2024-11-26 NOTE — PROGRESS NOTES
"Clayton Parra is a 59 y.o. male on hospital day 20 of admission presenting with Altered mental status, unspecified altered mental status type.    Subjective   Patient was seen at bedside today with medical student, overall the patient gave mostly one-word answers.  The writer tried to he him but he found it very difficult.  When asked if the patient needed anything he stated prayer.  The writer notes that multiple times during the conversation the patient would reply to the writer and then go off into a vacant stare and stop talking while staring at the side of the bed.  When asked he would not discuss further what this meant.    =============================================  Additional info: No PRNs required overnight. Per nursing notes over the weekend, patient intermittently refusing meds and assessment but did not require PRNs for agitation over the weekend.     Objective   -Mild fine resting tremors bl upper and lower extremities    Vital Signs:      11/20/2024     7:45 AM 11/20/2024     3:46 PM 11/21/2024    12:00 AM 11/21/2024     7:43 AM 11/21/2024     5:46 PM 11/22/2024     8:12 AM 11/25/2024     7:42 AM   Vitals   Systolic 147 156 148 94 148 149 145   Diastolic 79 100 82 62 81 68 68   Heart Rate 69 65 66 70 69 68 77   Temp 36.3 °C (97.3 °F)  37 °C (98.6 °F) 36.7 °C (98.1 °F) 36.6 °C (97.9 °F) 36.5 °C (97.7 °F) 36.9 °C (98.4 °F)   Resp 18 18 18  16 17       Intake/Output last 3 Shifts:  No intake/output data recorded.    Mental Status Exam:  General/Appearance: No acute distress, laying in bed  in hospital gown, grooming and hygiene adequate. Bald male, appears younger than stated age.   Attitude/Behavior: engages in interview, initially guarded but then open and polite on interview. Calm, unblinking. Appears somewhat paranoid.  Motor Activity: psychomotor slowing, EPS/TD - present in upper and lower limbs , gait: not assessed  Mood: \"\"fine\"  Affect: congruent with mood, even in fluctuations, " constricted in range, Blunted, cooperative, guarded with elements of paranoia  Speech: speaks softly, normal rate, speaks in 1-3 word sentence fragments.  Thought Process: Linear mostly logical, no goal direction noted.quite concrete  Thought Content: denies SI/HI, Delusional thinking: paranoid per chart review, appears somewhat paranoid today, observing treatment team suspiciously. Patient states he feels safe on the unit.  Thought Perception: denies AVH, appears somewhat internally stimulated and will frequently look around the room and stop talking in the middle of his sentences.  Cognition: no overt deficits noted, not formally assessed  Insight: limited  Judgment: limited    Current Medications  Scheduled   aspirin, 81 mg, oral, Daily  atorvastatin, 40 mg, oral, Nightly  docusate sodium, 100 mg, oral, Daily  enoxaparin, 40 mg, subcutaneous, q24h  ferrous sulfate (325 mg ferrous sulfate), 65 mg of iron, oral, Daily with breakfast  haloperidol decanoate, 300 mg, intramuscular, q28 days  insulin glargine, 20 Units, subcutaneous, Nightly  insulin lispro, 0-10 Units, subcutaneous, q6h  insulin lispro, 18 Units, subcutaneous, TID AC  lisinopril, 20 mg, oral, Daily  [Held by provider] metFORMIN, 1,000 mg, oral, BID  polyethylene glycol, 17 g, oral, Daily  propranolol, 10 mg, oral, TID  thiamine, 100 mg, oral, Daily        PRN   PRN medications: dextrose, dextrose, dextrose, dextrose, glucagon, glucagon, glucagon, glucagon, LORazepam, metoprolol, QUEtiapine     Labs  Results for orders placed or performed during the hospital encounter of 11/06/24 (from the past 24 hours)   POCT GLUCOSE   Result Value Ref Range    POCT Glucose 223 (H) 74 - 99 mg/dL        Imaging  No results found.     Psychiatric Risk Assessment  Acute Risk of Harm to Others is Considered: Low  Acute Risk of Harm to Self is Considered: Low    Assessment:  59M history of schizophrenia (bipolar disorder per chart review) and diabetes, who returned to ED  from inpatient psych for medical exam due to worsening alterations in mentation, refusal to take PO, and unstable vital signs. Psychiatry was consulted on 11/6 for psychiatric evaluation. His outpatient medication regimen appears to be benztropine 0.5 mg BID, carbamazepine 400 mg nightly, clozapine 500 mg nightly, propranolol 10 mg TID.     Update 11/26: Patient seen at bedside today. Still speaking in short sentence fragments, denying pain, AVH, or any other complaints at this time. On exam, he is somewhat paranoid and unblinking and guarded, although superficially cooperative with interview. Patient pending placement to [facility]. No Medications changes at this time.    Impression:  Psychosis, improving  Catatonia - resolved  Schizophrenia    Recommendations:  Safety/Monitoring:  Patient does not meet criteria for inpatient psychiatric admission  Patient does not require 1:1 observation from a psychiatric perspective, defer to primary team  Daily interdisciplinary safety and planning huddle with Psychiatry  Video monitoring as with all patients on the MPU  Psychiatry will follow patient daily while on the MPU    Medications:  - Continue haldol decanoate 300mg every 28 days, (last dose 11/14/24)  - Continue IV ativan 1 mg Q8h PRN agitation, can add PO/IM if no IV access    Considerations:  - continue bowel regimen per primary given clozapine discontinuation    Therapies recommended:  art therapy, music therapy, pet therapy, recreational therapy, and will continue to assess for benefit and meaningful participation    Delirium Guidelines  Provide glasses, hearing aids and/or communication boards as needed for impairments  Frequent reorientation, minimize room and staff changes  Open blinds during the day, dark/quiet room at night   Minimal interruptions and daytime naps  Early evaluation and intervention by PT, out of bed as tolerated  Minimize use of restraints   Minimize use of benzodiazepines, anticholinergic  medications, and opiates (while ensuring adequate treatment of pain)  Keep Mg>2, K>4 (as able)  Ensure regular bowel and bladder function (as able)    Disposition/Discharge Planning:  SW following, appreciate assistance  -still pending placement to [facility]    Multidisciplinary Rounding  Consulting Physician, Fellow, Medical Student, Charge Nurse, Nurse, Pharmacist, Social Work, Care Coordinator, Dietician, Music Therapy, Art Therapy, Recreational Therapy, and     Medication Consent  N/A - Consult Service    This patient was staffed and discussed attending Dr. Vaca.     Michael Israel MD  PGY-5 Consult Liaison Psychiatry Fellow

## 2024-11-26 NOTE — DISCHARGE SUMMARY
Discharge Diagnosis  Altered mental status, unspecified altered mental status type    Issues Requiring Follow-Up  C/p with his PCP and neurology team     Discharge Meds     Medication List      START taking these medications     insulin glargine 100 unit/mL injection; Commonly known as: Lantus;   Inject 20 Units under the skin once daily at bedtime. Take as directed per   insulin instructions.; Replaces: Lantus Solostar U-100 Insulin 100 unit/mL   (3 mL) pen   * insulin lispro 100 unit/mL injection; Inject 0-10 Units under the skin   every 6 hours. Take as directed per insulin instructions.; Replaces:   HumaLOG KwikPen Insulin 200 unit/mL (3 mL) insulin pen pen   * insulin lispro 100 unit/mL injection; Inject 18 Units under the skin 3   times a day before meals. Take as directed per insulin instructions.  * This list has 2 medication(s) that are the same as other medications   prescribed for you. Read the directions carefully, and ask your doctor or   other care provider to review them with you.     CHANGE how you take these medications     haloperidol decanoate 100 mg/mL injection; Commonly known as: Haldol   Decanoate; Inject 3 mL (300 mg) into the muscle every 28 (twenty-eight)   days.; Start taking on: December 12, 2024; What changed: how much to take,   additional instructions   melatonin 3 mg tablet; Take 1 tablet (3 mg) by mouth as needed at   bedtime for sleep.; What changed: when to take this, reasons to take this     CONTINUE taking these medications     aspirin 81 mg EC tablet   atorvastatin 40 mg tablet; Commonly known as: Lipitor   benztropine 0.5 mg tablet; Commonly known as: Cogentin   carBAMazepine  mg 12 hr tablet; Commonly known as: TEGretol  XR   ferrous sulfate (325 mg ferrous sulfate) tablet   lisinopril 20 mg tablet   magnesium hydroxide 400 mg/5 mL suspension; Commonly known as: Milk of   Magnesia   metFORMIN 1,000 mg tablet; Commonly known as: Glucophage   propranolol 10 mg tablet;  Commonly known as: Inderal   thiamine 100 mg tablet; Commonly known as: Vitamin B-1     STOP taking these medications     cloZAPine 100 mg tablet; Commonly known as: Clozaril   cloZAPine 200 mg tablet; Commonly known as: Clozaril   HumaLOG KwikPen Insulin 200 unit/mL (3 mL) insulin pen pen; Generic   drug: insulin lispro; Replaced by: insulin lispro 100 unit/mL injection   Lantus Solostar U-100 Insulin 100 unit/mL (3 mL) pen; Generic drug:   insulin glargine; Replaced by: insulin glargine 100 unit/mL injection       Test Results Pending At Discharge  Pending Labs       No current pending labs.            Hospital Course  Clayton Parra is a 59 y.o. male presented with Altered mental status, unspecified altered mental status type.  Encephalopathy and catatonia resolved. Medically ready for discharge pending state level II evaluation.      #Acute encephalopathy: Metabolic, polypharmacy, versus toxic versus psychogenic  - Encephalopathy and catatonia now resolved, thought to be from antipsychotic regimen.  - CT head on 11/6 negative for intracranial hemorrhage   - S/p Acyclovir, ampicillin, ceftriaxone and vancomycin in the ED for empiric meningitis coverage. Bcx, WBC negative, afebrile, procal neg. No infectious source identified so far, Will not initiate abx at the moment.    - LP 11/8 : Neutrophils 42, protein 83, glucose 151, RBC 3000.   - Psych following:      - Continue haldol decanoate 300mg every 28 days, last dose on 11/14     - Continue IV ativan 1 mg Q8h PRN agitation, can add PO/IM if no IV access     - [place] not meet inpatient psych admission   - PT/OT: Seen evaluated for gait training transfer training balance training therapeutic exercise therapeutic activity, no further acute PT recommended.     #Insulin-dependent type 2 diabetes  - POC trend reviewed, patient not accepting all checks  - Metformin 1000 mg twice daily on hold  - Continue Lantus 20 mg at bedtime, patient has been refusing  for over the past week  - Lispro insulin sliding scale in place, mealtime 18 unit 3 times daily  - monitor for hypoglycemia    #Hypertension  -Lisinopril 20 mg daily  -Propranolol 10 mg 3 times daily     #Hyperlipidemia  -Atorvastatin 40 mg daily, aspirin 81 mg daily          Pertinent Physical Exam At Time of Discharge  Physical Exam    Outpatient Follow-Up  No future appointments.  Time > 30 min    Luke Elena MD

## 2025-05-22 NOTE — PROGRESS NOTES
Cardiovascular Clinic Note  [facility] [place] [id number]  [address] -   [facility] [address]  Dept Phone: [phone]      Nandini Warren  : 1939  PCP: Jovanny Rodríguez MD  Reason for Office Visit:   Chief Complaint   Patient presents with   • Follow-up     Pt is here for b/p f/u   • Shortness of Breath     Pt c/o SOB occasionally     • Dizziness     Lightheadedness x's months        Assessment:  Patient Active Problem List   Diagnosis   • Elevated blood pressure reading in office with white coat syndrome, with diagnosis of hypertension   • Obesity   • Chronic renal insufficiency   • CAD in native artery   • Aortic stenosis   • Carotid artery stenosis   • Family history of coronary arteriosclerosis   • Hyperglycemia   • Essential hypertension, benign   • Lipid disorder   • Hyponatremia   • Anemia   • S/P carotid endarterectomy   • Primary osteoarthritis of right hip   • History of transcatheter aortic valve implantation (ERIK)   • Presence of drug-eluting stent in left circumflex coronary artery   • Pulmonary nodule   • Paroxysmal atrial fibrillation  (CMD)   • Precordial pain   S/P TAVR with 26 mm ES3 valve through right TF approach 3/9/22,      TTE post TAVR 3/9/22 showed normal valve function with mean PG of 9 mmHg  TTE done  22 showed normal valve function with mean PG of 10-16 mmhg  TTE 3/2023 showed normal valve function with MG 12 mmHg     Plan:  :   1. Hypertension:  - Blood pressure goal: average around 140.  - Increase clonidine to 0.1 m tablets in the morning and 1 in the evening.  - Refill for clonidine.  - Maintain hydralazine at 12.5 mg.  - Take an additional dose of clonidine if blood pressure remains elevated.    2. Gastroesophageal Reflux Disease (GERD):  - Discomfort after eating may be related to GERD or acid reflux.  - Order nuclear stress test to evaluate cardiac function.  - If stress test results are normal, focus  Clayton Parra is a 59 y.o. male on day 17 of admission presenting with Altered mental status, unspecified altered mental status type.      Subjective   Patient was seen and examined at bedside this morning, was much more interactive, though not much conversant, but cooperative with physical exam.   Review of systems, patient denied having any symptom at that time.    Objective     Last Recorded Vitals  /68   Pulse 68   Temp 36.5 °C (97.7 °F)   Resp 17   Wt 99.8 kg (220 lb)   SpO2 97%   Intake/Output last 3 Shifts:  No intake or output data in the 24 hours ending 11/23/24 1517    Admission Weight  Weight: 99.8 kg (220 lb) (11/06/24 0305)    Daily Weight  11/06/24 : 99.8 kg (220 lb)    Image Results  FL guided lumbar puncture  Narrative: Interpreted By:  Maurisio Rodriguez and Stevens Alex   STUDY:  FL GUIDED LUMBAR PUNCTURE;  11/8/2024 2:47 pm      INDICATION:  Signs/Symptoms:lumbar puncture-- several attempts in the ED.          COMPARISON:  None.      ACCESSION NUMBER(S):  [id number]      ORDERING CLINICIAN:  LUIS CORTÉS      TECHNIQUE:  After discussion of risks, benefits, and alternatives, oral and  written informed consent was obtained. The patient was placed in  prone position on exam table. The L3-4 interspace was then marked  under fluoroscopy. The patient was then prepped and draped in sterile  fashion. Local anesthesia was achieved with 2% Lidocaine solution. A  20 gauge spinal needle was then introduced at the L3-L4 level under  direct fluoroscopic guidance until return of CSF was demonstrated. 10  ml of clear CSF was collected in 4 tubes. The stylet was then  replaced and the spinal needle was removed.  Hemostasis was achieved  with direct pressure and the area was dressed with a Band-Aid. The  patient tolerated procedure well without any immediate or clinically  apparent complications. Total fluoroscopy time was 0.5 MINUTES.      The collected CSF was then sent to the lab for  appropriate lab tests  as ordered by the referring physician.      FINDINGS:  A single periprocedural spot fluoroscopic image was provided for  interpretation. Image quality is such that fine bony detail is  obscured. A needle is seen to overlie the posterior elements of L4.      Impression: Successful fluoroscopic guided lumbar puncture.      I personally reviewed the images/study and I agree with the findings  as stated by Endy Boss DO PGY-2. This study was interpreted at  [facility], [place], [place].      MACRO:  None      Signed by: Maurisio Rodriguez 11/11/2024 2:34 PM  Dictation workstation:   [id number]      Physical Exam  Constitutional: Minimally verbal gentleman, alert active, cooperative not in acute distress  Eyes: PERRLA, clear sclera  ENMT: Not examined  Head / Neck: Atraumatic, normocephalic, supple neck, JVP not visualized  Lungs: Declined auscultation  Heart: Declined auscultation, palpable pulses in all extremities  GI: Declined auscultation and percussion  MSK: No range of motion performed  Extremities: Intact x 4, no peripheral edema  : No Kenney catheter inserted  Breast: Deferred  Neurological: AAO x 3 to person, place and date, facial muscles symmetrical, sensation intact, strength 4/4, no acute focal neurological deficits appreciated  Psychological: Appropriate mood and behavior  Relevant Results             Scheduled medications  aspirin, 81 mg, oral, Daily  atorvastatin, 40 mg, oral, Nightly  docusate sodium, 100 mg, oral, Daily  enoxaparin, 40 mg, subcutaneous, q24h  ferrous sulfate (325 mg ferrous sulfate), 65 mg of iron, oral, Daily with breakfast  haloperidol decanoate, 300 mg, intramuscular, q28 days  insulin glargine, 20 Units, subcutaneous, Nightly  insulin lispro, 0-10 Units, subcutaneous, q6h  insulin lispro, 18 Units, subcutaneous, TID AC  lisinopril, 20 mg, oral, Daily  [Held by provider] metFORMIN, 1,000 mg, oral, BID  polyethylene glycol,  on managing stomach issues.    3. Leg Edema:  - Continue torsemide for leg swelling.  - Take water pill as early as possible to minimize nocturnal urination.    4. Chronic Pain:  - Continue current pain management plan.  - Encourage walking.    5. Medication Management:  - Confirm hydralazine dosage by checking the bottle.  - Report any discrepancies.    6. Health Maintenance:  - Order nuclear stress test to evaluate cardiac function, given history of stents and TAVR.    Risks, benefits, and alternatives of treatment were discussed, including the importance of controlling blood pressure to prevent heart attacks and strokes, the potential side effects of medications, and the need to differentiate between cardiac and gastrointestinal causes of symptoms.    Follow-up:  - Arrange nuclear stress test.    Goal LDL <55  2/25:  Follow-up TTE 7/24: mild AS, normal EF  Follow-up carotid uS  1. Leg cramps.  The etiology of the muscle cramps could potentially be attributed to renal insufficiency, given his abnormal kidney function. Medication-induced muscle cramps have been ruled out after a thorough review of his current drug regimen. He has been advised to maintain adequate hydration, engage in regular stretching exercises, and utilize heat application for symptom management. Over-the-counter analgesics such as Tylenol may be used as needed.     2. Weight gain.  His weight has increased by 9 pounds since August 2024, likely due to fluid retention. The dosage of torsemide will be escalated to 20 mg daily, with a new prescription provided. Laboratory tests will be conducted in approximately 3 weeks to monitor renal function and potassium levels. The appropriateness of the increased torsemide dosage will be evaluated based on the lab results, and adjustments will be made as necessary.     3. Hypertension.  His blood pressure readings have consistently been elevated during office visits. He has been advised to continue monitoring  17 g, oral, Daily  propranolol, 10 mg, oral, TID  thiamine, 100 mg, oral, Daily      Continuous medications     PRN medications  PRN medications: dextrose, dextrose, dextrose, dextrose, glucagon, glucagon, glucagon, glucagon, LORazepam, metoprolol, QUEtiapine    Assessment/Plan      59-year-old male with PMHx: T2DM, schizophrenia, HLD, anemia, HTN, who originally presented to the ED a couple days ago from [facility] for refusing his psych medications.  Was deemed to not have capacity and sent to [facility] for psych admission.  Upon arrival at [facility] patient was found to be alert but nonverbal and not oriented to self place or time.    On arrival to the ED he was found to be diaphoretic rigid. Psych consulted, found to be with catatonia and encephalopathy (resolved). Pt is clear for discharge at this point to SNF, he does not need inpatient psych admission. Psych team adjusted his psych meds, plan is to keep with Haldol Dec 300mg q28 days monotherapy.   Dispo: [facility]. [facility] sent over clinicals to [facility] to start the auth.   Medically clear. Patients insurance is requesting Level II Evaluation be completed prior to issuing new authorization, date pending      Acute encephalopathy: Metabolic versus toxic versus psychogenic  -Most recent labs reviewed  -Presented with catatonia  -Encephalopathy and catatonia now resolved, thought to be from antipsychotic regimen.  Other etiology: Serotonin syndrome given tachycardia, hypertension, hyperreflexia.  Possible meningitis or encephalitis  -CT head on 11/6 negative for intracranial hemorrhage   - S/p Acyclovir, ampicillin, ceftriaxone and vancomycin in the ED for empiric meningitis coverage. Bcx, WBC negative, afebrile, procal neg. No infectious source identified so far, Will not initiate abx at the moment.    - LP 11/8 : Neutrophils 42, protein 83, glucose 151, RBC 3000.   - Psych following.      - Discontinued clozapine     - Discontinued  his blood pressure at home and report any significant changes.     4. Hypercholesterolemia.  His cholesterol levels remain suboptimal despite being on simvastatin three times a week. He has been advised to minimize the intake of red meat and eggs. The addition of another cholesterol-lowering medication was considered but not pursued due to potential side effects.     5. Anemia.  His hemoglobin level was recorded as 12 in July 2024. Given his renal insufficiency, it is unlikely that his hemoglobin levels will normalize. A hemoglobin test will be ordered in conjunction with the upcoming lab tests to monitor his levels.     6. Health maintenance.  He is scheduled for a carotid ultrasound in 2 months, as part of his annual follow-up for the TCAR procedure.     Follow-up  The patient will follow up in 3 to 4 weeks post-lab workup.     PROCEDURE  The patient underwent a 2.5 cm growth removal operation 3 months ago.        8/24: Follow-up labs  Cont current medicines  BP appears reasonably controlled  Will intensify diet control, it appears he has tried statins and other medicines in the past, may have been decreased due to elevated LFTs?  TTE reviewed, gets regular ones through [facility]  Carotid duplex / TCAR reviewed, will follow regularly, will do yearly  Cont torsemide, will try to take every day  Discussed exercise  Bring home Bps at next appt  D/w wife  Follow-up labs/tests as ordered below:  Orders Placed During This Encounter:  Orders Placed This Encounter   • cloNIDine (CATAPRES) 0.1 MG tablet   • Nuclear Stress Test (With Adenosine, Dobitamine)      Recent Labs:  Cholesterol (mg/dL)   Date Value   02/19/2025 200 (H)     HDL (mg/dL)   Date Value   02/19/2025 47     Cholesterol/ HDL Ratio (no units)   Date Value   02/19/2025 4.3     Triglycerides (mg/dL)   Date Value   02/19/2025 108     LDL (mg/dL)   Date Value   02/19/2025 131 (H)     Hemoglobin A1C (%)   Date Value   02/19/2025 5.4     The ASCVD Risk score (Hieu  LYNDSEY, et al., 2019) failed to calculate for the following reasons:    The 2019 ASCVD risk score is only valid for ages 40 to 79  Discussed lifestyle recommendations, dietary/exercise recommendations and provided literature  Follow-up with blood work / labs and recommended testing as scheduled  Literature in the after visit summary was provided on advised dietary, exercise, weight recommendations along with correct blood pressure measurement techniques. Today's vitals were given to the patient at checkout   Most recent ECHO / Stress Test / CT / Vascular imaging listed below  Pertinent labs were reviewed and displayed below  The patient's previous laboratory and cardiac diagnostic testing listed below has been reviewed and was utilized to establish my current plan of care.  Previous outside notes were reviewed and taken into consideration when deciding further treatment.  Time spent evaluating the patient includes chart preparation before the visit, review of labs, testing, previous notes from providers and coordination of care.  Orders listed below:     I personally visualized and reviewed: Testing listed below, imaging listed below.    Discussed with: Patient in detail    Return to Clinic: Return in about 3 months (around 8/22/2025) for extended follow-up 30 minutes. Patient instructed to have all ordered testing prior to follow-up visit.     History of Present Illness:  Dear Dr. Rodríguez, Jovanny MUKHERJEE MD, We had the pleasure of seeing Nandini Warren in the [facility] [place]. Thank you for allowing me to see this patient in the office. As you know, this is a 86 year old male who presents with the chief complaint of Follow-up (Pt is here for b/p f/u), Shortness of Breath (Pt c/o SOB occasionally/), and Dizziness (Lightheadedness x's months )  . 5/22/2025 Nandini Warren presents back today feeling well. Denies chest pain, shortness of breath, syncope, leg edema. No new medication changes. No cardiovascular  scheduled ativan 1mg Q6 IV     - Discontinued ativan 1 mg IM q6h PRN severe agitation     - Continue haldol decanoate 300mg every 28 days to be given 11/14     - Continue IV ativan 1 mg Q8h PRN agitation, can add PO/IM if no IV access  - Pt [place] not meet inpatient psych admission   -No as needed's required overnight  - Cont fall precaution   - No need for 1:1 sitter  - PT/OT: Seen evaluated for gait training transfer training balance training therapeutic exercise therapeutic activity, no further acute PT recommended.     Insulin-dependent type 2 diabetes  -POC trend reviewed to ensure glycemic control  -Metformin 1000 mg twice daily on hold  -Continue Lantus 20 mg at bedtime  -Lispro insulin sliding scale in place, mealtime 18 unit 3 times daily     Hypertension  -Lisinopril 20 mg daily  -Propranolol 10 mg 3 times daily     Hyperlipidemia  -Atorvastatin 40 mg daily, aspirin 81 mg daily     Diet  -Diabetic     DVT prophylaxis  -Lovenox 40 mg subcu daily     Disposition: Presenting with acute encephalopathy likely due to antipsychotic, needed further management, now medically cleared, discharge pending placement    Discharge planning discussed with [facility], who is awaiting to hear back State official review for authorization to discharge to SNF         Juan Ramon Wallace DO       interval changes except when stated. 5/25:      The patient presents for a follow-up visit. He was last seen on 02/25/2025, at which time his leg edema was being managed.    He reports experiencing leg cramps in his feet and toes for the past year, which have been causing him discomfort. Despite chronic pain, he has been attempting to walk. He has had leg swelling and has been on torsemide for that reason. His weight had been increasing, necessitating higher doses of torsemide up to 15 mg daily. He has been following with an outside physician after his TAVR. He has a history of stents to the left circumflex in 2022. His last stress test in 02/2023 showed normal perfusion. He has been maintained on carvedilol, hydralazine, and torsemide as well.    He has been following up as an outpatient with our nurse practitioner regarding his blood pressure. He contacted the office and noticed his blood pressure was not well controlled. He was started on hydralazine but noticed worsening dizziness. His blood pressure at home has been ranging from 130s to 190s systolic and 70s to 90s diastolic and had slight improvement. He was feeling weaker on the hydralazine and was hesitant to increase his torsemide and hydralazine. He was also advised to contact his primary care physician. His blood pressures have been elevated, ranging from 145 to 150. He had troubles with Zetia as well. He has been taking clonidine 0.1 mg twice daily and hydralazine 25 mg twice daily, which he believes may be causing some side effects. He is also on terazosin 2 mg twice daily. He experienced tinnitus, dizziness, lightheadedness, and a sensation of imbalance during the initial days of this medication regimen. He also reported occasional inflammation around his wrist. However, these symptoms have improved recently. He monitors his blood pressure at home and notes that it has improved compared to previous readings of 180/90. This morning, his blood pressure  readings were 166, then 2 points lower, and finally 150, taken 10 minutes apart.    He experiences discomfort approximately one hour after eating breakfast or lunch, which is not localized to the chest. This discomfort is intermittent and often accompanied by lightheadedness, which typically resolves within an hour. He is uncertain whether these symptoms are related to his GERD, acid reflux, or cardiac issues. He recalls a previous hospitalization for COVID-19, during which he experienced a burning sensation that was attributed to acid reflux following an EKG.    He maintains a walking routine of approximately 2000 steps twice daily, once in the afternoon and once at 7:00 PM, without experiencing exhaustion. However, he reports knee instability and heat sensation, which he believes may be related to his knees. These symptoms do not occur during walking.    He reports frequent urination at night, with 6 to 7 episodes during a 9-hour sleep period, which disrupts his sleep. He takes torsemide and aspirin at 11:00 AM.    He also reports a sensation of emptiness in his heart region and occasional hollowing sensations.    SOCIAL HISTORY  Exercise: Walks about 2000 steps twice a day, once in the afternoon and once at 7:00 PM.  Diet: Eats breakfast and lunch, mentions discomfort after eating.  2/25/2025 Nandini Warren presents back today feeling well. Denies chest pain, shortness of breath, syncope, leg edema. No new medication changes. No cardiovascular interval changes except when stated. 2/25:   The patient presents for evaluation of leg cramps, weight gain, hypertension, hypercholesterolemia, and anemia.     He reports experiencing leg cramps, predominantly originating from the arch of his foot and radiating to the big toe. These cramps have been persistent for over a year, causing significant discomfort. He describes the onset of these cramps as a sensation akin to needles. The cramps have been severe enough to  disrupt his sleep, as evidenced by an episode last night where he was unable to sleep due to a cramp that originated in the arch of his foot and subsequently spread. Despite attempts at alleviating the discomfort through massage and elevation of the leg, the cramp persisted. However, the application of a hot pad provided some relief. He also reports experiencing pain in his knee, hip, and lumbar region after walking approximately 1000 steps, a task that takes him about 20 minutes. He has a history of hip and lumbar issues, but the onset of knee pain is a new development. Occasionally, he experiences a cramp-like pain in his knee. He also reports a sensation of weakness when bending down to put on his socks, which he describes as feeling like he might blackout. Despite these symptoms, he reports feeling strong while walking, but is forced to stop when the knee pain begins. He has been managing these symptoms for over a year.     He also reports leg swelling that has been present for more than a year. He is currently on torsemide 10 mg, which he took last night due to severe pain. This resulted in increased urination. He reports no shortness of breath or chest pain.     He had an operation 3 months ago where a 2.5 cm growth was removed. It was discovered through a CT scan. It has healed, and there is no cancer. He feels better because before he always slept on his left side, which was painful.     His blood pressure readings at home are slightly elevated, typically ranging between 145 and 150. He does not monitor his blood pressure daily. He reports no dietary changes that could account for these readings.     He is currently on simvastatin, which he takes three times a week. He has experienced tinnitus as a side effect of this medication. Zetia was added to his regimen, but it resulted in side effects.     He has expressed concern about his hemoglobin levels, as he believes they may be low. He had a blood draw 2 to 3  weeks ago. He has a history of anemia dating back 40 years, which was attributed to hemorrhoids. He underwent a bone marrow biopsy at that time.     MEDICATIONS  Current: torsemide, simvastatin        8/24:  Back after he last saw Dr. Gladis Garcia.  He has a history of TAVR 2 years ago he has a history of a TCAR as well and coronary stenting.  He is also been having difficult to control blood pressure over the years.  He presents back feeling well.  He is trying to stay active but has hip issues that limit him.  He does more calisthenics and actual walking.  He does not follow a heart healthy diet eats eggs daily and does eat red meat.  It appears that he had been on the statin regularly in the past but that was decreased after he was hospitalized due to anemia and COVID this may have been due to elevated LFTs he denies any history of having myalgias.  It appears he been tried on other medicines before after discussion with him.  His blood pressure fluctuates he states at home it can range from 140 systolic to 120s systolic at times.  He denies any dizziness lightheadedness.  He has had leg edema for which he is placed on torsemide which does help he also elevates his legs which does help as well.  He denies any specific chest pain or cardiac complaints at this time.  Review of Systems:  A medically appropriate Review of Systems was performed for this visit and is negative except for HPI.     Physical Exam:  Visit Vitals  BP (!) 193/81 (BP Location: LUE - Left upper extremity, Patient Position: Sitting, Cuff Size: Large Adult)   Pulse (!) 2   Resp 16   Ht 6' 1\" (1.854 m)   Wt 100.7 kg (221 lb 14.3 oz)   SpO2 97%   BMI 29.28 kg/m²     Wt Readings from Last 4 Encounters:   05/22/25 100.7 kg (221 lb 14.3 oz)   04/28/25 104 kg (229 lb 4.5 oz)   03/24/25 103 kg (226 lb 15.4 oz)   02/25/25 105.7 kg (233 lb 0.4 oz)     Vital signs and previous weights were reviewed during today's visit.    Gen: no acute distress,  AOx3  Neck: Supple, no JVP, no carotid bruit  CV: RRR, S1, S2, No G/R/M  Chest: Lungs BCTA, non-labored respirations   Ext: warm, well perfused, + b/l LE LE edema     Blood Pressure: 160/70  Heart: Regular rate and rhythm, no murmurs or abnormal sounds detected.  Lungs: Clear to auscultation bilaterally, no wheezes, rales, or rhonchi.    ALLERGIES:   Allergen Reactions   • Chlorthalidone Other (See Comments)   • Hydrochlorothiazide Other (See Comments)     Hyponatremia    • Eplerenone Other (See Comments)   • Ezetimibe Other (See Comments)   • Statins Other (See Comments)     Intolerance with higher doses       Current Medications    ASPIRIN 81 MG EC TABLET    Take 81 mg by mouth daily.     HYDRALAZINE (APRESOLINE) 25 MG TABLET    Take 12.5 mg by mouth in the morning and 12.5 mg in the evening.    NITROGLYCERIN (NITRO-TIME) 2.5 MG CR CAPSULE    Take 2.5 mg by mouth in the morning and 2.5 mg in the evening.    PANTOPRAZOLE (PROTONIX) 40 MG TABLET    Take 40 mg by mouth daily. ON HOLD    SIMVASTATIN (ZOCOR) 20 MG TABLET    Take 1 tablet by mouth 3 days a week. Take 1 tablet on Monday , Wednesday and Friday as directed    TERAZOSIN (HYTRIN) 2 MG CAPSULE    Take 2 mg by mouth in the morning and 2 mg in the evening.    TORSEMIDE (DEMADEX) 10 MG TABLET    Take 1.5 tablets by mouth daily.       Arvydas's Allergies and Medications were reviewed with the patient and updated during today's visit. In addition, I discussed medication dosage, usage, goals of therapy, and side effects with him.    Cardiovascular Diagnostic Studies:    LAST EKG:  No results found for this or any previous visit (from the past 4464 hours).    LAST ECHO/ECHO STRESS:  Results for orders placed in visit on 09/10/21    TRANSTHORACIC ECHO (TTE) COMPLETE W/ W/O IMAGING AGENT    Impression  *[facility]*  [address] [place] [address], [address]  [place] [address] [phone]  Transthoracic Echocardiogram  (TTE)    Patient: Nandini Warren    Study Date/Time:   Sep 10 2021 9:57AM  MRN:     [id number]                 FIN#:              [id number]  :     1939              Ht/Wt:             185.4cm 108.4kg  Age:     82                      BSA/BMI:           2.32m^2 31.5kg/m^2  Gender:  M                       Baseline BP:       140 / 68  Ordering Physician:    Alfonso Griffith MD    Referring Physician:   Alfonso Griffith MD    Primary Physician:     Jovanny Rodríguez MD    Performing Physician:  Alfonso Griffith MD  Diagnostic Physician:  Alfonso Griffith MD  Sonographer:           Sahara Michel RDCS    --------------------------------------------------------------------------  INDICATIONS:   Nonrheumatic aortic valve stenosis.    --------------------------------------------------------------------------  STUDY CONCLUSIONS  SUMMARY:    1. Left ventricle: The cavity size is normal. Wall thickness is moderately  increased. The ejection fraction was measured by biplane method of  disks. Doppler parameters are consistent with abnormal left ventricular  relaxation (grade 1 diastolic dysfunction). The ejection fraction is  65%.  2. Aortic valve: The annulus is mildly to moderately calcified. The valve  is trileaflet. The leaflets are moderately calcified. There is severe  stenosis. Mild regurgitation. The mean systolic gradient is 52 mm Hg.  The peak systolic gradient is 92mm Hg. The LVOT to aortic valve VTI  ratio is 0.2. The valve area index by the velocity-time integral method  is 0.42cm^2/m^2.  3. Aorta: Dilated ascending aorta 3.8 Cm.  4. Mitral valve: The annulus is mildly calcified. The leaflets are normal  thickness.  5. Left atrium: The atrium is mildly dilated.  6. Right ventricle: The estimated peak pressure is 35mm Hg.  7. Pericardium, extracardiac: There is no pericardial effusion.  8. Baseline ECG: Sinus bradycardia.  9. Compared to the last ECHO report dated 3/9/21, the mean gradient across  the aortic vavle  has increased from 43 mm Hg to the current 52 mm Hg.    --------------------------------------------------------------------------  STUDY DATA:  [place] The previous study was not available, so  comparison is made to the report of March 2021.  Procedure:  Transthoracic  echocardiography was performed. Image quality was adequate. The study was  technically limited due to body habitus.  M-mode, complete 2D, complete  spectral Doppler, and color Doppler.  Study status:  Routine.  Study  completion:  There were no complications.    FINDINGS    LEFT VENTRICLE:  The cavity size is normal. Wall thickness is moderately  increased. Systolic function is normal. Wall motion is normal; there are  no regional wall motion abnormalities.    The ejection fraction was  measured by biplane method of disks. The ejection fraction is 65%. Doppler  parameters are consistent with abnormal left ventricular relaxation (grade  1 diastolic dysfunction).    AORTIC VALVE:  The annulus is mildly to moderately calcified. The valve is  trileaflet. The leaflets are moderately calcified. Cusp separation is  markedly reduced.  Doppler:   There is severe stenosis.    Mild  regurgitation.    The LVOT to aortic valve VTI ratio is 0.2. The valve  area by the velocity-time integral method is 1.0cm^2. The valve area index  by the velocity-time integral method is 0.42cm^2/m^2. The ratio of LVOT to  aortic valve peak velocity is 0.22. The valve area by the peak velocity  method is 1.0cm^2. The valve area index by the peak velocity method is  0.44cm^2/m^2.    The mean systolic gradient is 52 mm Hg. The peak systolic  gradient is 92mm Hg.    AORTA:  Dilated ascending aorta 3.8 Cm. Aortic root: The aortic root is  normal in size.    MITRAL VALVE:  The annulus is mildly calcified. The leaflets are normal  thickness. Leaflet separation is normal.  Doppler:  Transvalvular velocity  is within the normal range. There is no evidence for stenosis.   Mild  regurgitation.    The valve area (LVOT continuity) is 3.3cm^2. The valve  area index (LVOT continuity) is 1.41cm^2/m^2.    The peak diastolic  gradient is 2mm Hg.    LEFT ATRIUM:  The atrium is mildly dilated.    RIGHT VENTRICLE:  The cavity size is normal. Wall thickness is normal.  Systolic function is normal. The estimated peak pressure is 35mm Hg.    PULMONIC VALVE:    Structurally normal valve.   Cusp separation is normal.  Doppler:  Transvalvular velocity is within the normal range. The peak  systolic gradient is 2mm Hg.    TRICUSPID VALVE:   Structurally normal valve.   Leaflet separation is  normal.  Doppler:  Transvalvular velocity is within the normal range.  Trivial regurgitation.    PULMONARY ARTERY:   The main pulmonary artery is normal-sized. Systolic  pressure is within the normal range.    RIGHT ATRIUM:  The atrium is normal in size.    PERICARDIUM:  There is no pericardial effusion.    SYSTEMIC VEINS:  Inferior vena cava: The vessel is normal in size. The respirophasic  diameter changes are in the normal range (greater than or equal to 50%).    BASELINE ECG:   Sinus bradycardia.    --------------------------------------------------------------------------  Measurements    Left ventricle        Value        03/09/2021 Ref        Left atrium continued   Value          03/09/2021 Ref  STERING, LAX      (L)     4.1   cm     5.1        4.2 - 5.8  Vol/bsa, S              34    ml/m^2   24         16 -  chord                                                                                                      34  ESD, LAX              2.6   cm     4.1        2.5 - 4.0  Vol, ES, 1-p    (H)     77    ml       47         18 -  chord                                                    A4C                                               58  TSERING/bsa, LAX  (L)     1.8   cm/m^2 2.2        2.2 - 3.0  Vol/bsa, ES,            33    ml/m^2   20         12 -  chord                                                    1-p  A4C                                           37  ESD/bsa, LAX  (L)     1.1   cm/m^2 1.7        1.3 - 2.1  Vol, ES, 1-p    (H)     76    ml       56         18 -  chord                                                    A2C                                               58  PW, ED, LAX   (H)     1.2   cm     1.4        0.6 - 1.0  Vol/bsa, ES,            33    ml/m^2   24         11 -  TSERING major ax,         8.0   cm     ---------- ---------  1-p A2C                                           43  A4C                                                      Vol, ES, 2-p            79    ml       ---------- -----  ESD major ax,         6.5   cm     ---------- ---------  Vol/bsa, ES,            34    ml/m^2   ---------- 16 -  A4C                                                      2-p                                               34  FS major              19    %      ---------- ---------  axis, A4C                                                Aortic valve            Value          03/09/2021 Ref  TSERING/bsa major         3.4   cm/m^2 ---------- ---------  Peak v, S               4.8   m/sec    4.3        -----  ax, A4C                                                  Mean v, S               3.34  m/sec    3.12       -----  ESD/bsa major         2.8   cm/m^2 ---------- ---------  Mean grad, S            52    mm Hg    43         -----  ax, A4C                                                  Peak grad, S            70    mm Hg    70         -----  CLARICE, A4C              27.2  cm^2   ---------- ---------  LVOT/AV, VTI            0.2            0.3        -----  MARIBEL, A4C              14.5  cm^2   ---------- ---------  ratio  FAC, A4C              47    %      ---------- ---------  TONI, VTI                1.0   cm^2     0.9        -----  PW, ED        (H)     1.2   cm     1.4        0.6 - 1.0  TONI/bsa, VTI            0.42  cm^2/m^2 0.39       -----  IVS/PW, ED            1.25         1          ---------  LVOT/AV, Vpeak           0.22           0.27       -----  EDV                   69    ml     125        62 - 150   ratio  ESV           (L)     18    ml     72         21 - 61    TONI, Vmax               1.0   cm^2     0.8        -----  EF                    65    %      65         52 - 72    TONI/bsa, Vmax           0.44  cm^2/m^2 0.35       -----  SV                    50    ml     53         ---------  AR ED v                 2.87  m/s      ---------- -----  EDV/bsa       (L)     30    ml/m^2 53         34 - 74    AR decel                94    cm/s^2   ---------- -----  ESV/bsa       (L)     8     ml/m^2 30         11 - 31    AR PHT                  896   ms       ---------- -----  SV/bsa                21    ml/m^2 22         ---------  AR ED grad              33    mm Hg    ---------- -----  SV, 1-p A4C           50    ml     83         ---------  SV/bsa, 1-p           21    ml/m^2 35         ---------  Mitral valve            Value          03/09/2021 Ref  A4C                                                      Peak E                  0.76  m/sec    0.78       -----  EDV, 2-p              71    ml     86         62 - 150   Peak A                  1.25  m/sec    1.27       -----  ESV, 2-p              25    ml     29         21 - 61    Decel time              359   ms       345        -----  SV, 2-p               47    ml     ---------- ---------  Peak grad, D            2     mm Hg    7          -----  EDV/bsa, 2-p  (L)     31    ml/m^2 36         34 - 74    Peak E/A ratio          0.6            0.61       -----  ESV/bsa, 2-p          11    ml/m^2 12         11 - 31    MVA, LVOT cont          3.3   cm^2     ---------- -----  SV/bsa, 2-p           20.1  ml/m^2 ---------- ---------  MVA/bsa, LVOT           1.41  cm^2/m^2 ---------- -----  E', lat carlita,  (L)     8.81  cm/sec 4          >=10       cont  TDI  E/e', lat             9            19         ---------  Pulmonic valve          Value          03/09/2021 Ref  carlita, TDI                                                  Peak v, S               0.7   m/sec    ---------- -----  E', med carlita,  (L)     6.42  cm/sec 4          >=7        Peak grad, S            2     mm Hg    ---------- -----  TDI  E/e', med             12           19         ---------  Tricuspid valve         Value          03/09/2021 Ref  carlita, TDI                                                 TR peak v               2.5   m/sec    ---------- <=2.8  E', avg, TDI          7.615 cm/sec 4          ---------  Peak RV-RA              25    mm Hg    ---------- -----  E/e', avg,            10           19         <=14       grad, S  TDI                                                      Max TR matt              2.5   m/sec    ---------- -----    LVOT                  Value        03/09/2021 Ref        Aortic root             Value          03/09/2021 Ref  Diam, S               2.3   cm     2.0        ---------  Root diam               3.1   cm       3.3        <4.4  Area                  4.2   cm^2   3.1        ---------  Peak matt, S           1.03  m/sec  1.16       ---------  Ascending aorta         Value          03/09/2021 Ref  Peak grad, S          4     mm Hg  5          ---------  AAo diam                3.8   cm       ---------- 2.2 -  3.8  Ventricular septum    Value        03/09/2021 Ref        AAo AP diam/bsa         1.6   cm/m^2   ---------- 1.1 -  IVS, ED       (H)     1.5   cm     1.4        0.6 - 1.0                                                    1.9  AAo AP diam, ED         3.7   cm       4.0        2.2 -  Right ventricle       Value        03/09/2021 Ref                                                          3.8  TSERING, LAX              3.1   cm     ---------- ---------  AAo AP                  1.6   cm/m^2   1.7        1.1 -  Pressure, S           33    mm Hg  ---------- ---------  diam/bsa, ED                                      1.9  S' lateral    (H)     16.3  cm/sec ---------- 6 - 13.4  Pulmonary artery         Value          2021 Ref  Left atrium           Value        2021 Ref        Pressure, S             31    mm Hg    ---------- -----  AP dim, ES            3.7   cm     5.2        3.0 - 4.0  AP dim index          1.6   cm/m^2 2.2        1.5 - 2.3  Systemic veins          Value          2021 Ref  Area ES, A4C  (H)     24    cm^2   19         <=20       Estimated CVP           8     mm Hg    ---------- -----  Area ES, A2C          23    cm^2   ---------- ---------  Vol, S        (H)     79    ml     57         18 - 58  Legend:  (L)  and  (H)  natalie values outside specified reference range.    Prepared and electronically signed by  Alfonso Griffith MD  09/10/2021 16:06      CATH REPORT:  Results for orders placed or performed during the hospital encounter of 09/15/22   Cath/PV Case    Narrative    Procedure:  TCAR     Operators:  EVELINE Carmona     Findings:  95% occluded left ICA     Intervention:   9 x 40 mm Stent      Plan:  Post op care / CCU  BP control  Can resume home meds     Primary Cardiologist:  Gladis         STRESS TEST:   Results for orders placed or performed in visit on 17   Stress test only    Narrative    <IMG [email or web address]>     [facility] - Diagnostic Test Results    TYPE OF TEST: NUCLEAR STRESS TEST - REGADENOSON PROTOCOL  Date of Test: 2017  Test Location: [place]    Patient: SHERON NEWELL  MRNO: [id number]                        Patient Phone: [phone]  : 1940                     Sex: M Height: 72 Weight: [phone]  [facility] Physician: Alfonso Griffith MD   Ordering Physician: Alfonso Griffith MD  PCP: Jeremiah Briceno MD             INTERPRETING PHYSICIAN: Alfonso Griffith MD      Impression    1. Normal SPECT myocardial perfusion imaging study.  2. Normal ECG response to regadenoson.  3. Normal global left ventricular systolic function. The calculated ejection fraction is 58%.  4. No stress related ischemic symptoms or  significant arrhythmias.  5. Normal blood pressure to regadenoson with resting systolic hypertension.  6. Compared to the last nuclear stress study performed on 04/21/2014, the previous study was an exercise stress versus the current pharmacologic stress. The stress ECG remains normal. Left ventricular systolic function remains normal. The perfusion abnormality involving the inferoapical segment of the left ventricle is not demonstrated on the current study.    REASON FOR TEST: Assessment of chest pain, radiating to back/head, Hot flash, Dyslipidemia, Family history of CAD and Hypertension, Carotid Artery Stenosis    REASON FOR PHARMACOLOGICAL TEST: Back/Hip Pain    Comparison Study: 4/21/2014    FINDINGS: The overall quality of the study is good. There was no motion noted during acquisition. The post-stress SPECT images reveal normal perfusion. Resting images are also normal.     Gated SPECT imaging reveals the left ventricular cavity is normal in size. There is normal wall thickening and contractility. The calculated left ventricular ejection fraction is 58%.    Nuclear Cardiology:   IV SITE: Right metacarpal  IV REMOVAL: MORE  SITE ASSESS: Unremarkable   REST DOSE: 13.3 mCi Tc-99m Tetrofosmin intravenously  @ 7 30 AM, 5/2/2017   STRESS DOSE: 37.9 mCi Tc-99m Tetrofosmin intravenously @ 8 45 AM, 05/02/2017 MORE  Regadenoson Lot #: [id number] 6/1/2020    Medications: *Nitrostat, Coreg, Simvastatin, AmLODIPine Besylate, Aspir-81.    Per Ordering Physician, Medications Held Prior to Testing: N    Resting EKG: Sinus Bradycardia, HR-55, Incomplete RBBB, 1st AV Block, Poor R Wave Progression    0.4mg/5ml of Regadenoson is infused intravenously over 10 seconds    STAGE   MPH/GRADE     TIME   BP        HR    SYMPTOMS    0       Resting       0      150/80    55      1       Infusion      1      134/74    65    None  R0      Recovery 0    0.2    130/70    82    None  R1      Min. Post Inf 2:00   130/70    78    None  R2       Min. Post Inf 4:00   144/70    74    None    Total Test Time: 1:00  Peak BP: 134/74  Peak HR: 65  Additional Medications Administered: None      Stress ECG: No ST Depression  Symptoms: No Chest Pain  Significant Arrhythmias: Isolated PAC  Blood Pressure Response: Normal With resting hypertension      TEST CONDUCTED BY: Regadenoson injected by Alysha Russell RN, Margarette Jensen MA    Stress EKG Reviewed By:Alfonso Griffith MD  SR/BZ    Alfonso Griffith M.D.  [facility]    SR/caw - DD: 05/02/2017 - DT: 05/03/2017 - Job ID: [id number] -c: Jeremiah Briceno MD                                      NUCLEAR STRESS TEST:   Results for orders placed or performed in visit on 02/07/23   STRESS TEST WITH MYOCARDIAL PERFUSION   Result Value Ref Range    Predicted HR Max 135 BPM    Resting HR Achieved 51 BPM    Baseline /70 mmHg    Stress peak HR 59 bpm    HEART RATE RESERVE PREDICTED 56.30 BPM    Percent HR 44 %    Post peak /68 mmHg    Exercise duration (min) 1 min    Ejection Fraction 69 >/=50% %    Narrative    Table formatting from the original result was not included.  NUCLEAR STRESS TEST - REGADENOSON PROTOCOL      Impression       1. Normal SPECT myocardial perfusion imaging.  2. Normal ECG response to Regadenoson.  3. Normal left ventricular systolic function. The calculated left ejection   fraction is 66% at rest and 69% at stress.  4.  When compared to previous stress test, no change in perfusion or LV   function          Comparison Study: April 3, 2019    REASON FOR TEST: Aortic stenosis, Carotid stenosis, PTCA/Stent, Atrial   fibrillation, Evaluation of CAD, Hypertension     REASON FOR PHARMACOLOGICAL TEST: Unable to exercise to required levels    FINDINGS:  The overall quality of the study is fair.  The post-stress SPECT images   demonstrate homogenous tracer distribution throughout the myocardium. The   rest images reveal similar findings.     Gated SPECT imaging reveals normal myocardial thickening and  wall motion.   The calculated left ventricular ejection fraction is 66% at rest, and is   69% at stress.    Prone imaging was not utilized.    MEDICATIONS:  Current Outpatient Medications   Medication Sig Dispense Refill   • ezetimibe (ZETIA) 10 MG tablet Take 1 tablet by mouth daily. 90 tablet 3     • carvedilol (COREG) 12.5 MG tablet TAKE 1 TABLET BY MOUTH TWICE DAILY   WITH MEALS 180 tablet 3   • lisinopril (ZESTRIL) 20 MG tablet Take 1 tablet by mouth in the morning   and 1 tablet in the evening. 180 tablet 1   • terazosin (HYTRIN) 2 MG capsule Take 2 mg by mouth nightly.     • sucralfate (CARAFATE) 1 g tablet Take 1 g by mouth at bedtime.     • pantoprazole (PROTONIX) 40 MG tablet Take 40 mg by mouth daily.     • torsemide (DEMADEX) 10 MG tablet Take 1 tablet by mouth daily. 90 tablet   3   • simvastatin (ZOCOR) 20 MG tablet Take 1 tablet on Monday , Wednesday and   Friday as directed (Patient taking differently: Take 20 mg by mouth 3 days   a week. Take 1 tablet on Monday , Wednesday and Friday as directed) 45   tablet 3   • aspirin 81 MG EC tablet Take 81 mg by mouth daily.      • nitroGLYcerin (NITROSTAT) 0.4 MG sublingual tablet Place 0.4 mg under   the tongue every 5 minutes as needed for Chest pain.        Current Facility-Administered Medications   Medication Dose Route Frequency Provider Last Rate Last Admin   • regadenoson (LEXISCAN) injection 0.4 mg  0.4 mg Injection Once Alfonso Griffith MD       • technetium Tc 99m tetrofosmin (MYOVIEW) injection 30 millicurie  30   millicurie Intravenous Once Alfonso Griffith MD           Resting EKG: Normal sinus rhythm - 51 BPM, Right axis deviation, T-Wave   abnormality, Poor R-wave progression    ##########################  Stress ECG: No ST Depression  Symptoms: None  Significant Arrhythmias:  None  Blood Pressure Response: Normal       No orders to display        VASCULAR IMAGING:    Labs:  Recent Labs     02/19/25  0907 03/19/25  0952 05/12/25  0754    CHOLESTEROL 200*  --   --    CALCLDL 131*  --   --    HDL 47  --   --    TRIGLYCERIDE 108  --   --    AST  --  21  --    SODIUM  --  139 139   CHLORIDE  --  107 105   BUN  --  49* 59.4*   POTASSIUM  --  3.7 4.3   GLUCOSE  --  115* 100*   CREATININE  --  1.93* 2.03*   GFRNA  --   --  31.0*   CALCIUM  --  8.8 8.9   HGBA1C 5.4  --   --        Recent Labs     03/19/25  0952   WBC 6.2   RBC 4.29*   HGB 12.5*   HCT 39.4   MCV 91.8   MCHC 31.7*   RDWCV 14.8   *   TLYMPH 16            Labs   - Blood Test: Excess fluid in the body. Kidneys and potassium levels were normal.    Imaging   - Echocardiogram: 07/2024, Ejection fraction 55 to 60%    Diagnostic Testing   - Stress Test: 02/2023, Normal perfusion    Medical Problem associated handouts and literature were provided upon checkout for further patient education. Please see \"Patient Instructions\" tab.     Thank you Jovanny Rodríguez MD for allowing me to see this patient in our office. Please call our office with any questions. Correspondence will be sent to your office.    Cristi Mackenzie D.O., F.A.C.C., F.A.C.O.I  [facility]  [facility] [place]    * This note was generated in part using \"Dragon\" voice recognition technology. The report was reviewed by this physician but still may have unintentional errors due to inherent limitations of voice recognition technology. [employer] was used when applicable.